# Patient Record
Sex: MALE | Race: WHITE | NOT HISPANIC OR LATINO | Employment: OTHER | URBAN - METROPOLITAN AREA
[De-identification: names, ages, dates, MRNs, and addresses within clinical notes are randomized per-mention and may not be internally consistent; named-entity substitution may affect disease eponyms.]

---

## 2017-01-03 DIAGNOSIS — Z95.810 ICD (IMPLANTABLE CARDIOVERTER-DEFIBRILLATOR) IN PLACE: Primary | ICD-10-CM

## 2017-01-03 DIAGNOSIS — I25.5 ISCHEMIC CARDIOMYOPATHY: ICD-10-CM

## 2017-01-03 DIAGNOSIS — I47.29 NSVT (NONSUSTAINED VENTRICULAR TACHYCARDIA): ICD-10-CM

## 2017-01-05 ENCOUNTER — CLINICAL SUPPORT (OUTPATIENT)
Dept: CARDIOLOGY | Facility: CLINIC | Age: 72
End: 2017-01-05
Payer: COMMERCIAL

## 2017-01-05 ENCOUNTER — OFFICE VISIT (OUTPATIENT)
Dept: CARDIOLOGY | Facility: CLINIC | Age: 72
End: 2017-01-05
Payer: COMMERCIAL

## 2017-01-05 VITALS
HEIGHT: 70 IN | HEART RATE: 68 BPM | BODY MASS INDEX: 24.77 KG/M2 | WEIGHT: 173 LBS | SYSTOLIC BLOOD PRESSURE: 120 MMHG | DIASTOLIC BLOOD PRESSURE: 66 MMHG

## 2017-01-05 DIAGNOSIS — I25.9 CHRONIC ISCHEMIC HEART DISEASE: Chronic | ICD-10-CM

## 2017-01-05 DIAGNOSIS — I27.20 PULMONARY HTN: ICD-10-CM

## 2017-01-05 DIAGNOSIS — I48.92 PAROXYSMAL ATRIAL FLUTTER: Chronic | ICD-10-CM

## 2017-01-05 DIAGNOSIS — I25.10 CORONARY ARTERY DISEASE DUE TO LIPID RICH PLAQUE: Chronic | ICD-10-CM

## 2017-01-05 DIAGNOSIS — I47.29 NSVT (NONSUSTAINED VENTRICULAR TACHYCARDIA): ICD-10-CM

## 2017-01-05 DIAGNOSIS — I50.22 CHRONIC SYSTOLIC CONGESTIVE HEART FAILURE: ICD-10-CM

## 2017-01-05 DIAGNOSIS — I25.5 ISCHEMIC CARDIOMYOPATHY: ICD-10-CM

## 2017-01-05 DIAGNOSIS — N18.6 ESRD (END STAGE RENAL DISEASE): Chronic | ICD-10-CM

## 2017-01-05 DIAGNOSIS — I10 ESSENTIAL HYPERTENSION: ICD-10-CM

## 2017-01-05 DIAGNOSIS — I48.92 ATRIAL FLUTTER WITH RAPID VENTRICULAR RESPONSE: ICD-10-CM

## 2017-01-05 DIAGNOSIS — Z95.810 S/P IMPLANTATION OF AUTOMATIC CARDIOVERTER/DEFIBRILLATOR (AICD): Primary | ICD-10-CM

## 2017-01-05 DIAGNOSIS — Z95.810 ICD (IMPLANTABLE CARDIOVERTER-DEFIBRILLATOR) IN PLACE: ICD-10-CM

## 2017-01-05 DIAGNOSIS — I25.83 CORONARY ARTERY DISEASE DUE TO LIPID RICH PLAQUE: Chronic | ICD-10-CM

## 2017-01-05 PROCEDURE — 99213 OFFICE O/P EST LOW 20 MIN: CPT | Mod: S$GLB,,, | Performed by: PHYSICIAN ASSISTANT

## 2017-01-05 PROCEDURE — 1126F AMNT PAIN NOTED NONE PRSNT: CPT | Mod: S$GLB,,, | Performed by: PHYSICIAN ASSISTANT

## 2017-01-05 PROCEDURE — 1160F RVW MEDS BY RX/DR IN RCRD: CPT | Mod: S$GLB,,, | Performed by: PHYSICIAN ASSISTANT

## 2017-01-05 PROCEDURE — 1157F ADVNC CARE PLAN IN RCRD: CPT | Mod: S$GLB,,, | Performed by: PHYSICIAN ASSISTANT

## 2017-01-05 PROCEDURE — 1159F MED LIST DOCD IN RCRD: CPT | Mod: S$GLB,,, | Performed by: PHYSICIAN ASSISTANT

## 2017-01-05 PROCEDURE — 99999 PR PBB SHADOW E&M-EST. PATIENT-LVL IV: CPT | Mod: PBBFAC,,, | Performed by: PHYSICIAN ASSISTANT

## 2017-01-05 NOTE — PROGRESS NOTES
Subjective:    Patient ID:  Quinn Hutchins is a 71 y.o. male who presents for follow-up of hospital follow-up/wound check    HPI   Mr. Hutchins is a 71 year old male patient with a PMHx of HD, HTN, ICM, pulmonary HTN, atrial flutter, anemia, and syncope who presents today for routine follow-up/wound check. Patient recently underwent AICD placement on 12/27/16 by Dr. Woodward. He returns today and states he is feeling well. No complaints. MACIAS stable, unchanged. No chest pain or anginal signs/symptoms. No AICD site complaints/shocks. Patient reports compliance with his medications and dialysis regimen. AICD interrogation revealed ? PAF on 1/2/16, will have Dr. Woodward review. Patient was previously on Coumadin in the past however this was stopped due to severe epistaxis.     Review of Systems   Constitution: Negative for chills, decreased appetite, fever, weakness and malaise/fatigue.   HENT: Negative for congestion, headaches, hoarse voice and sore throat.    Eyes: Negative for blurred vision and discharge.   Cardiovascular: Positive for dyspnea on exertion (chronic, unchanged) and leg swelling. Negative for chest pain, claudication, cyanosis, irregular heartbeat, near-syncope, orthopnea, palpitations and paroxysmal nocturnal dyspnea.   Respiratory: Negative for cough, hemoptysis, shortness of breath, snoring, sputum production and wheezing.    Endocrine: Negative for cold intolerance and heat intolerance.   Hematologic/Lymphatic: Negative for bleeding problem. Does not bruise/bleed easily.   Skin: Negative for rash.   Musculoskeletal: Positive for arthritis and joint pain. Negative for back pain, joint swelling, muscle cramps, muscle weakness and myalgias.   Gastrointestinal: Negative for abdominal pain, constipation, diarrhea, heartburn, melena and nausea.   Genitourinary: Negative for hematuria.   Neurological: Negative for dizziness, focal weakness, light-headedness, loss of balance, numbness, paresthesias and  seizures.   Psychiatric/Behavioral: Negative for memory loss. The patient does not have insomnia.    Allergic/Immunologic: Negative for hives.        Objective:    Physical Exam   Constitutional: He is oriented to person, place, and time. He appears well-developed and well-nourished. No distress.   HENT:   Head: Normocephalic and atraumatic.   Eyes: Pupils are equal, round, and reactive to light. Right eye exhibits no discharge. Left eye exhibits no discharge.   Neck: Neck supple. No JVD present. No tracheal deviation present. No thyromegaly present.   Cardiovascular: Normal rate, regular rhythm, S1 normal, S2 normal, normal heart sounds and intact distal pulses.  PMI is not displaced.  Exam reveals no gallop, no S3, no S4 and no friction rub.    No murmur heard.  Pulses:       Radial pulses are 2+ on the right side   Pulmonary/Chest: Effort normal and breath sounds normal. No respiratory distress. He has no wheezes. He has no rales.   AICD site C/D/I; no erythema or active bleeding, no drainage   Abdominal: Soft. He exhibits no distension. There is no tenderness. There is no rebound.   Musculoskeletal: He exhibits no edema.   Neurological: He is alert and oriented to person, place, and time.   Skin: Skin is warm and dry. He is not diaphoretic. No erythema.   CVI changes BLE     Psychiatric: He has a normal mood and affect. His behavior is normal. Thought content normal.   Nursing note and vitals reviewed.        2D Echo CONCLUSIONS     1 - Severe left ventricular enlargement.     2 - Biatrial enlargement.     3 - Eccentric hypertrophy.     4 - Severely depressed left ventricular systolic function (EF 20-25%).     5 - Left ventricular diastolic dysfunction.     6 - Right ventricular enlargement with moderately to severely depressed systolic function.     7 - Pulmonary hypertension. The estimated PA systolic pressure is 55 mmHg.     8 - Intermediate central venous pressure.     9 - Mildly enlarged ascending aorta.        Impression: NORMAL MYOCARDIAL PERFUSION  1. The perfusion scan is free of evidence for myocardial ischemia or injury. The inferior wall is obscured by bowel.  2. There is abnormal wall motion at rest showing mild to moderate global hypokinesis of the left ventricle.   3. There is resting LV dysfunction with a reduced ejection fraction of 40 %.   4. The ventricular volumes are normal at rest and stress.   5. The extracardiac distribution of radioactivity is normal.   Assessment:       1. S/P implantation of automatic cardioverter/defibrillator (AICD)    2. Pulmonary HTN    3. Atrial flutter with rapid ventricular response    4. Chronic ischemic heart disease    5. Chronic systolic congestive heart failure    6. Coronary artery disease due to lipid rich plaque    7. Essential hypertension    8. Paroxysmal atrial flutter    9. ESRD (end stage renal disease)       Doing well s/p AICD implantation. No complaints. Continue current meds. Questionable episode of PAF on 1/2/16?  Device interrogation report still pending, will await final report and Dr. Woodward's recommendations. As stated earlier, Coumadin previously stopped due to severe epistaxis. Message routed to Dr. Woodward today.  Plan:   -Continue current medical management and risk factor modification  -Cardiac, low salt diet  -Will route message to Dr. Woodward  -Follow-up with Dr. Anne in 3 months    Chart reviewed. Dr. Anne agrees with plan as outlined above.

## 2017-01-05 NOTE — MR AVS SNAPSHOT
[facility]  [address] [facility] [address]  Phone: [phone]  Fax: [phone]                  Quinn Hutchins   2017 4:30 PM   Office Visit    Description:  Male : 1945   Provider:  NIKKI Stokes   Department:  [facility]                To Do List           Future Appointments        Provider Department Dept Phone    3/28/2017 1:30 PM LABORATORY, [address] [facility] [phone]    3/28/2017 3:00 PM NIKKI Morton-MINE [facility] [phone]    2017 11:10 AM LABORATORY, [address] [facility] [phone]    2017 11:30 AM [facility] CT1 LIMIT 500 LBS [facility]  [phone]    2017 1:00 PM Aristeo Valentine IV, MD [facility] [phone]      Goals (5 Years of Data)     Continue renal heart healthy diet.       [facility]     [facility] Nurse Care Line -  Assistance  Registered nurses in the [facility] provide clinical advisement, health education, appointment booking, and other advisory services.  Call for this free service at [phone].             Medications           Message regarding Medications     Verify the changes and/or additions to your medication regime listed below are the same as discussed with your clinician today.  If any of these changes or additions are incorrect, please notify your healthcare provider.        STOP taking these medications     colchicine (COLCRYS) 0.6 mg tablet Take 0.3mg (half a tablet) once a week on .           Verify that the below list of medications is an accurate representation of the medications you are currently taking.  If none reported, the list may be blank. If incorrect, please contact your healthcare provider. Carry this list with you in case of emergency.           Current Medications     acetaminophen (TYLENOL) 500 MG tablet Take 500 mg by mouth every 6 (six) hours as needed for  Pain.    acitretin (SORIATANE) 10 MG capsule Take 10 mg by mouth every evening.     allopurinol (ZYLOPRIM) 100 MG tablet Take 1 tablet (100 mg total) by mouth once daily.    amiodarone (PACERONE) 400 MG tablet Take 1 tablet (400 mg total) by mouth 2 (two) times daily.    aspirin (ECOTRIN) 81 MG EC tablet Take 81 mg by mouth once daily.    atorvastatin (LIPITOR) 40 MG tablet TAKE 1 TABLET BY MOUTH EVERY EVENING    carvedilol (COREG) 25 MG tablet Take 0.5 tablets (12.5 mg total) by mouth 2 (two) times daily with meals.    docusate sodium (COLACE) 100 MG capsule Take 1 capsule (100 mg total) by mouth 3 (three) times daily as needed for Constipation.    isosorbide mononitrate (IMDUR) 30 MG 24 hr tablet Take 1 tablet (30 mg total) by mouth once daily.    loratadine (CLARITIN) 10 mg tablet Take 10 mg by mouth once daily.    mupirocin (BACTROBAN) 2 % ointment Apply to wound infection 2 times a week for 7 days    nitroGLYCERIN (NITROSTAT) 0.4 MG SL tablet Place 1 tablet (0.4 mg total) under the tongue every 5 (five) minutes as needed for Chest pain.    pantoprazole (PROTONIX) 40 MG tablet Take 1 tablet (40 mg total) by mouth once daily.    predniSONE (DELTASONE) 5 MG tablet TAKE 1 TABLET(S) ORAL (BY MOUTH) EVERY DAY    pyridoxine (VITAMIN B-6) 200 mg Tab Take 1 tablet by mouth every evening.     RANEXA 500 mg Tb12 TAKE 1 TABLET TWICE A DAY    sertraline (ZOLOFT) 100 MG tablet Take 50 mg by mouth daily as needed.    sevelamer carbonate (RENVELA) 800 mg Tab Take 1 tablet (800 mg total) by mouth 3 (three) times daily with meals.    thiamine 100 MG tablet Take 1 tablet (100 mg total) by mouth once daily.    tramadol (ULTRAM) 50 mg tablet Take 1 tablet (50 mg total) by mouth every 6 (six) hours as needed for Pain.           Clinical Reference Information           Vital Signs - Last Recorded  Most recent update: 1/5/2017  4:24 PM by Cheli Orlando LPN    BP Pulse Ht Wt BMI    120/66 (BP Location: Right arm, Patient Position:  "Sitting, BP Method: Manual) 68 5' 10" (1.778 m) 78.5 kg (173 lb) 24.82 kg/m2      Blood Pressure          Most Recent Value    BP  120/66      Allergies as of 1/5/2017     Codeine    Hydrocodone    Percocet [Oxycodone-acetaminophen]    Doxycycline    Levaquin [Levofloxacin]    Xanax [Alprazolam]      Immunizations Administered on Date of Encounter - 1/5/2017     None      Maintenance Dialysis History     Start End Type Comments Center    10/8/2012  [facility]            [facility] Information     [facility] [address]  Phone #:  [phone]    Contact:  N/A JUVENAL VILLANUEVA  [address] Fax #:  [phone]            "

## 2017-01-06 PROBLEM — Z95.810 S/P IMPLANTATION OF AUTOMATIC CARDIOVERTER/DEFIBRILLATOR (AICD): Status: ACTIVE | Noted: 2017-01-06

## 2017-01-27 ENCOUNTER — HOSPITAL ENCOUNTER (OUTPATIENT)
Facility: HOSPITAL | Age: 72
Discharge: HOME | End: 2017-01-28
Attending: EMERGENCY MEDICINE
Payer: COMMERCIAL

## 2017-01-27 DIAGNOSIS — N18.5 CHRONIC RENAL FAILURE, STAGE 5: ICD-10-CM

## 2017-01-27 DIAGNOSIS — R79.89 ELEVATED TROPONIN I LEVEL: Primary | ICD-10-CM

## 2017-01-27 DIAGNOSIS — R94.31 ABNORMAL EKG: ICD-10-CM

## 2017-01-27 DIAGNOSIS — R11.2 NON-INTRACTABLE VOMITING WITH NAUSEA, UNSPECIFIED VOMITING TYPE: ICD-10-CM

## 2017-01-27 LAB
ALBUMIN SERPL BCP-MCNC: 3.3 G/DL
ALP SERPL-CCNC: 131 U/L
ALT SERPL W/O P-5'-P-CCNC: 8 U/L
ANION GAP SERPL CALC-SCNC: 13 MMOL/L
APTT BLDCRRT: 31.2 SEC
AST SERPL-CCNC: 19 U/L
BASOPHILS # BLD AUTO: 0.01 K/UL
BASOPHILS NFR BLD: 0.1 %
BILIRUB SERPL-MCNC: 1.1 MG/DL
BNP SERPL-MCNC: 4582 PG/ML
BUN SERPL-MCNC: 22 MG/DL
CALCIUM SERPL-MCNC: 10.4 MG/DL
CHLORIDE SERPL-SCNC: 100 MMOL/L
CK MB SERPL-MCNC: 1.8 NG/ML
CK MB SERPL-RTO: 6 %
CK SERPL-CCNC: 30 U/L
CK SERPL-CCNC: 30 U/L
CO2 SERPL-SCNC: 24 MMOL/L
CREAT SERPL-MCNC: 4.3 MG/DL
DIFFERENTIAL METHOD: ABNORMAL
EOSINOPHIL # BLD AUTO: 0 K/UL
EOSINOPHIL NFR BLD: 0.3 %
ERYTHROCYTE [DISTWIDTH] IN BLOOD BY AUTOMATED COUNT: 20.5 %
EST. GFR  (AFRICAN AMERICAN): 15 ML/MIN/1.73 M^2
EST. GFR  (NON AFRICAN AMERICAN): 13 ML/MIN/1.73 M^2
GLUCOSE SERPL-MCNC: 113 MG/DL
HCT VFR BLD AUTO: 45.2 %
HGB BLD-MCNC: 15.5 G/DL
INR PPP: 1.1
LYMPHOCYTES # BLD AUTO: 0.9 K/UL
LYMPHOCYTES NFR BLD: 8.2 %
MAGNESIUM SERPL-MCNC: 2.1 MG/DL
MCH RBC QN AUTO: 33 PG
MCHC RBC AUTO-ENTMCNC: 34.3 %
MCV RBC AUTO: 96 FL
MONOCYTES # BLD AUTO: 0.9 K/UL
MONOCYTES NFR BLD: 7.8 %
NEUTROPHILS # BLD AUTO: 9.2 K/UL
NEUTROPHILS NFR BLD: 83.6 %
PHOSPHATE SERPL-MCNC: 4.2 MG/DL
PLATELET # BLD AUTO: 117 K/UL
PMV BLD AUTO: 9.9 FL
POTASSIUM SERPL-SCNC: 4.3 MMOL/L
PROT SERPL-MCNC: 7.9 G/DL
PROTHROMBIN TIME: 11.6 SEC
RBC # BLD AUTO: 4.7 M/UL
SODIUM SERPL-SCNC: 137 MMOL/L
TROPONIN I SERPL DL<=0.01 NG/ML-MCNC: 0.1 NG/ML
WBC # BLD AUTO: 10.97 K/UL

## 2017-01-27 PROCEDURE — 82553 CREATINE MB FRACTION: CPT

## 2017-01-27 PROCEDURE — 96372 THER/PROPH/DIAG INJ SC/IM: CPT

## 2017-01-27 PROCEDURE — 84484 ASSAY OF TROPONIN QUANT: CPT

## 2017-01-27 PROCEDURE — 80053 COMPREHEN METABOLIC PANEL: CPT

## 2017-01-27 PROCEDURE — 85610 PROTHROMBIN TIME: CPT

## 2017-01-27 PROCEDURE — 93010 ELECTROCARDIOGRAM REPORT: CPT | Mod: ,,, | Performed by: INTERNAL MEDICINE

## 2017-01-27 PROCEDURE — 96374 THER/PROPH/DIAG INJ IV PUSH: CPT

## 2017-01-27 PROCEDURE — 85025 COMPLETE CBC W/AUTO DIFF WBC: CPT

## 2017-01-27 PROCEDURE — 84100 ASSAY OF PHOSPHORUS: CPT

## 2017-01-27 PROCEDURE — 63600175 PHARM REV CODE 636 W HCPCS: Performed by: EMERGENCY MEDICINE

## 2017-01-27 PROCEDURE — G0378 HOSPITAL OBSERVATION PER HR: HCPCS

## 2017-01-27 PROCEDURE — 99285 EMERGENCY DEPT VISIT HI MDM: CPT | Mod: 25

## 2017-01-27 PROCEDURE — 83735 ASSAY OF MAGNESIUM: CPT

## 2017-01-27 PROCEDURE — 83880 ASSAY OF NATRIURETIC PEPTIDE: CPT

## 2017-01-27 PROCEDURE — 85730 THROMBOPLASTIN TIME PARTIAL: CPT

## 2017-01-27 RX ORDER — ONDANSETRON 2 MG/ML
4 INJECTION INTRAMUSCULAR; INTRAVENOUS
Status: COMPLETED | OUTPATIENT
Start: 2017-01-27 | End: 2017-01-27

## 2017-01-27 RX ORDER — SEVELAMER CARBONATE 800 MG/1
800 TABLET, FILM COATED ORAL
Status: DISCONTINUED | OUTPATIENT
Start: 2017-01-28 | End: 2017-01-28 | Stop reason: HOSPADM

## 2017-01-27 RX ORDER — CARVEDILOL 12.5 MG/1
12.5 TABLET ORAL 2 TIMES DAILY WITH MEALS
Status: DISCONTINUED | OUTPATIENT
Start: 2017-01-28 | End: 2017-01-28 | Stop reason: HOSPADM

## 2017-01-27 RX ORDER — ONDANSETRON 2 MG/ML
4 INJECTION INTRAMUSCULAR; INTRAVENOUS EVERY 12 HOURS PRN
Status: DISCONTINUED | OUTPATIENT
Start: 2017-01-27 | End: 2017-01-28 | Stop reason: HOSPADM

## 2017-01-27 RX ORDER — AMIODARONE HYDROCHLORIDE 200 MG/1
200 TABLET ORAL DAILY
Status: CANCELLED | OUTPATIENT
Start: 2017-01-28

## 2017-01-27 RX ORDER — ISOSORBIDE MONONITRATE 30 MG/1
30 TABLET, EXTENDED RELEASE ORAL DAILY
Status: CANCELLED | OUTPATIENT
Start: 2017-01-28

## 2017-01-27 RX ORDER — ACETAMINOPHEN 325 MG/1
650 TABLET ORAL EVERY 8 HOURS PRN
Status: DISCONTINUED | OUTPATIENT
Start: 2017-01-27 | End: 2017-01-28 | Stop reason: HOSPADM

## 2017-01-27 RX ORDER — ENOXAPARIN SODIUM 100 MG/ML
1 INJECTION SUBCUTANEOUS
Status: COMPLETED | OUTPATIENT
Start: 2017-01-27 | End: 2017-01-27

## 2017-01-27 RX ORDER — NITROGLYCERIN 0.4 MG/1
0.4 TABLET SUBLINGUAL EVERY 5 MIN PRN
Status: DISCONTINUED | OUTPATIENT
Start: 2017-01-27 | End: 2017-01-28 | Stop reason: HOSPADM

## 2017-01-27 RX ORDER — ASPIRIN 81 MG/1
81 TABLET ORAL DAILY
Status: CANCELLED | OUTPATIENT
Start: 2017-01-28

## 2017-01-27 RX ORDER — CETIRIZINE HYDROCHLORIDE 10 MG/1
10 TABLET ORAL DAILY
Status: DISCONTINUED | OUTPATIENT
Start: 2017-01-28 | End: 2017-01-28 | Stop reason: HOSPADM

## 2017-01-27 RX ORDER — PANTOPRAZOLE SODIUM 40 MG/1
40 TABLET, DELAYED RELEASE ORAL DAILY
Status: DISCONTINUED | OUTPATIENT
Start: 2017-01-28 | End: 2017-01-28 | Stop reason: HOSPADM

## 2017-01-27 RX ORDER — ATORVASTATIN CALCIUM 40 MG/1
40 TABLET, FILM COATED ORAL NIGHTLY
Status: DISCONTINUED | OUTPATIENT
Start: 2017-01-28 | End: 2017-01-28 | Stop reason: HOSPADM

## 2017-01-27 RX ADMIN — ENOXAPARIN SODIUM 80 MG: 100 INJECTION SUBCUTANEOUS at 09:01

## 2017-01-27 RX ADMIN — ONDANSETRON 4 MG: 2 INJECTION INTRAMUSCULAR; INTRAVENOUS at 07:01

## 2017-01-27 NOTE — ED AVS SNAPSHOT
[facility]   [address] [facility] [address]               Quinn Hutchins   2017  6:47 PM   ED    Description:  Male : 1945   Department:  [facility]           Your Care was Coordinated By:     Provider Role From To    Alex Ying MD Attending Provider 17 1848 17 1641      Reason for Visit     Headache           Diagnoses this Visit        Comments    Elevated troponin I level    -  Primary     Non-intractable vomiting with nausea, unspecified vomiting type         Abnormal EKG         Chronic renal failure, stage 5           ED Disposition     ED Disposition Condition Comment    Observation             To Do List           Follow-up Information     Follow up with Jameel Almaguer MD In 3 days.    Specialty:  Internal Medicine    Why:  hospital follow up     Contact information:    [address]  [facility] [address] [phone]         These Medications        Disp Refills Start End    ondansetron (ZOFRAN-ODT) 4 MG TbDL 20 tablet 0 2017     Take 2 tablets (8 mg total) by mouth every 8 (eight) hours as needed. - Oral    Pharmacy: [facility]/pharmacy #[id number] [facility] [address] [facility] #: [phone]         [facility]     [facility] Care Line -  Assistance  Registered nurses in the [facility] provide clinical advisement, health education, appointment booking, and other advisory services.  Call for this free service at [phone].             Medications           Message regarding Medications     Verify the changes and/or additions to your medication regime listed below are the same as discussed with your clinician today.  If any of these changes or additions are incorrect, please notify your healthcare provider.        START taking these NEW medications        Refills    ondansetron (ZOFRAN-ODT) 4 MG TbDL 0    Sig: Take 2 tablets (8 mg total) by mouth every 8 (eight)  hours as needed.    Class: Normal    Route: Oral      These medications were administered today        Dose Freq    ondansetron injection 4 mg 4 mg ED 1 Time    Sig: Inject 4 mg into the vein ED 1 Time.    Class: Normal    Route: Intravenous    ondansetron injection 4 mg 4 mg Every 12 hours PRN    Sig: Inject 4 mg into the vein every 12 (twelve) hours as needed for Nausea/Vomiting (1st choice) - use as first treatment.    Class: Normal    Route: Intravenous    acetaminophen tablet 650 mg 650 mg Every 8 hours PRN    Sig: Take 2 tablets (650 mg total) by mouth every 8 (eight) hours as needed for Temperature greater than (or equal to 101 degree F).    Class: Normal    Route: Oral    enoxaparin injection 80 mg 1 mg/kg × 76.8 kg ED 1 Time    Sig: Inject 0.8 mLs (80 mg total) into the skin ED 1 Time.    Class: Normal    Route: Subcutaneous    cetirizine tablet 10 mg 10 mg Daily    Starting on: 1/28/2017    Sig: Take 1 tablet (10 mg total) by mouth once daily.    Class: Normal    Route: Oral           Verify that the below list of medications is an accurate representation of the medications you are currently taking.  If none reported, the list may be blank. If incorrect, please contact your healthcare provider. Carry this list with you in case of emergency.           Current Medications     amiodarone (PACERONE) 400 MG tablet Take 1 tablet (400 mg total) by mouth 2 (two) times daily.    aspirin (ECOTRIN) 81 MG EC tablet Take 81 mg by mouth once daily.    atorvastatin (LIPITOR) 40 MG tablet TAKE 1 TABLET BY MOUTH EVERY EVENING    docusate sodium (COLACE) 100 MG capsule Take 1 capsule (100 mg total) by mouth 3 (three) times daily as needed for Constipation.    isosorbide mononitrate (IMDUR) 30 MG 24 hr tablet Take 1 tablet (30 mg total) by mouth once daily.    loratadine (CLARITIN) 10 mg tablet Take 10 mg by mouth once daily.    pantoprazole (PROTONIX) 40 MG tablet Take 1 tablet (40 mg total) by mouth once daily.     pyridoxine (VITAMIN B-6) 200 mg Tab Take 1 tablet by mouth every evening.     RANEXA 500 mg Tb12 TAKE 1 TABLET TWICE A DAY    sertraline (ZOLOFT) 100 MG tablet Take 50 mg by mouth daily as needed.    sevelamer carbonate (RENVELA) 800 mg Tab Take 1 tablet (800 mg total) by mouth 3 (three) times daily with meals.    thiamine 100 MG tablet Take 1 tablet (100 mg total) by mouth once daily.    acetaminophen (TYLENOL) 500 MG tablet Take 500 mg by mouth every 6 (six) hours as needed for Pain.    acetaminophen tablet 650 mg Take 2 tablets (650 mg total) by mouth every 8 (eight) hours as needed for Temperature greater than (or equal to 101 degree F).    acitretin (SORIATANE) 10 MG capsule Take 10 mg by mouth every evening.     allopurinol (ZYLOPRIM) 100 MG tablet Take 1 tablet (100 mg total) by mouth once daily.    atorvastatin tablet 40 mg Take 1 tablet (40 mg total) by mouth every evening.    carvedilol (COREG) 25 MG tablet Take 0.5 tablets (12.5 mg total) by mouth 2 (two) times daily with meals.    carvedilol tablet 12.5 mg Take 1 tablet (12.5 mg total) by mouth 2 (two) times daily with meals.    cetirizine tablet 10 mg Take 1 tablet (10 mg total) by mouth once daily.    mupirocin (BACTROBAN) 2 % ointment Apply to wound infection 2 times a week for 7 days    nitroGLYCERIN (NITROSTAT) 0.4 MG SL tablet Place 1 tablet (0.4 mg total) under the tongue every 5 (five) minutes as needed for Chest pain.    nitroGLYCERIN SL tablet 0.4 mg Place 1 tablet (0.4 mg total) under the tongue every 5 (five) minutes as needed for Chest pain.    ondansetron (ZOFRAN-ODT) 4 MG TbDL Take 2 tablets (8 mg total) by mouth every 8 (eight) hours as needed.    ondansetron injection 4 mg Inject 4 mg into the vein every 12 (twelve) hours as needed for Nausea/Vomiting (1st choice) - use as first treatment.    pantoprazole EC tablet 40 mg Take 1 tablet (40 mg total) by mouth once daily.    predniSONE (DELTASONE) 5 MG tablet TAKE 1 TABLET(S) ORAL (BY  "MOUTH) EVERY DAY    sevelamer carbonate tablet 800 mg Take 1 tablet (800 mg total) by mouth 3 (three) times daily with meals.    tramadol (ULTRAM) 50 mg tablet Take 1 tablet (50 mg total) by mouth every 6 (six) hours as needed for Pain.           Clinical Reference Information           Your Vitals Were     BP Pulse Temp Resp Height Weight    147/91 88 98.4 °F (36.9 °C) (Oral) 17 5' 9" (1.753 m) 76.8 kg (169 lb 5 oz)    SpO2 BMI             99% 25 kg/m2         Allergies as of 1/28/2017        Reactions    Codeine Other (See Comments)    Hallucination    Hydrocodone Other (See Comments)    Impairs vision; AMS    Percocet [Oxycodone-acetaminophen] Other (See Comments)    Makes him go crazy    Doxycycline Rash    Other reaction(s): Unknown    Levaquin [Levofloxacin] Rash    Blisters    Xanax [Alprazolam] Palpitations    pychosis       Immunizations Administered on Date of Encounter - 1/28/2017     None      ED Micro, Lab, POCT     Start Ordered       Status Ordering Provider    01/28/17 0708 01/28/17 0707  Comprehensive metabolic panel  STAT      Final result     01/28/17 0600 01/28/17 0413    Early Morning Draw,   Status:  Canceled      Canceled     01/28/17 0600 01/28/17 0413  Troponin I  Now then every 6 hours     Start Status   01/28/17 0600 Final result   01/28/17 1200 Acknowledged       Acknowledged     01/28/17 0600 01/28/17 0413  CK-MB  Every 6 hours     Start Status   01/28/17 0600 Final result   01/28/17 1200 Acknowledged       Acknowledged     01/28/17 0600 01/28/17 0413  CBC auto differential  Early Morning Draw      Edited Result - FINAL     01/28/17 0430 01/27/17 2053    Early Morning Draw,   Status:  Canceled      Canceled     01/28/17 0430 01/27/17 2053    Early Morning Draw,   Status:  Canceled      Canceled     01/28/17 0100 01/27/17 2058    Now then every 6 hours,   Status:  Canceled     Start Status   01/28/17 0100 Final result   01/28/17 0700 Canceled       Canceled     01/28/17 0000 01/27/17 2053 "    Every 6 hours,   Status:  Canceled      Canceled     01/28/17 0000 01/27/17 2054    Every 6 hours,   Status:  Canceled     Start Status   01/28/17 0000 Final result   01/28/17 0600 Canceled       Canceled     01/28/17 0000 01/27/17 2054  CK  Every 6 hours     Start Status   01/28/17 0000 Final result   01/28/17 0600 Final result       Acknowledged     01/27/17 1902 01/27/17 1902  CBC auto differential  STAT      Final result     01/27/17 1902 01/27/17 1902  Comprehensive metabolic panel  STAT      Final result     01/27/17 1902 01/27/17 1902  Protime-INR  STAT      Final result     01/27/17 1902 01/27/17 1902  APTT  STAT      Final result     01/27/17 1902 01/27/17 1902  Magnesium  STAT      Final result     01/27/17 1902 01/27/17 1902  Phosphorus  STAT      Final result     01/27/17 1902 01/27/17 1902  Troponin I  STAT      Final result     01/27/17 1902 01/27/17 1902  CK-MB  STAT      Final result     01/27/17 1902 01/27/17 1902  CK  STAT      Final result     01/27/17 1902 01/27/17 1902  Brain natriuretic peptide  STAT      Final result       ED Imaging Orders     Start Ordered       Status Ordering Provider    01/27/17 1902 01/27/17 1902  CT Head Without Contrast  1 time imaging      Final result     01/27/17 1902 01/27/17 1902  X-Ray Chest 1 View  1 time imaging      Final result       Your Scheduled Appointments     Mar 28, 2017  1:30 PM CDT   Non-Fasting Lab with [facility]ATORY, [place] [facility]    [address]  [place] [address]   [phone]            Mar 28, 2017  2:20 PM CDT   Established Patient Visit with Darron Anne MD   [facility]    [address]  [place] [address]   [phone]            Mar 28, 2017  3:00 PM CDT   Established Patient Visit with Denisha Manzano PA-C   [facility] ([facility]    [address]  [place] [address]   [phone]            Jul 13, 2017 11:10 AM CDT   Non-Fasting Lab with [facility]ATORY,  [facility]   [facility]    [address] [facility] [address]   [phone]            Jul 18, 2017 11:30 AM CDT   Ct Abd Pel W Contrast with [facility] CT1 LIMIT 500 LBS   [facility]    [address] [address] [address]   [phone]               [facility] complies with applicable Federal civil rights laws and does not discriminate on the basis of race, color, national origin, age, disability, or sex.        Language Assistance Services     ATTENTION: Language assistance services are available, free of charge. Please call [phone].      ATENCIÓN: Si habla español, tiene a koehler disposición servicios gratuitos de asistencia lingüística. Llame al [phone].     TATE Ý: N?u b?n nói Ti?ng Vi?t, có các d?ch v? h? tr? ngôn ng? mi?n phí dành cho b?n. G?i s? [phone].

## 2017-01-28 VITALS
RESPIRATION RATE: 15 BRPM | BODY MASS INDEX: 25.08 KG/M2 | HEIGHT: 69 IN | SYSTOLIC BLOOD PRESSURE: 103 MMHG | HEART RATE: 70 BPM | TEMPERATURE: 98 F | OXYGEN SATURATION: 100 % | WEIGHT: 169.31 LBS | DIASTOLIC BLOOD PRESSURE: 67 MMHG

## 2017-01-28 LAB
ALBUMIN SERPL BCP-MCNC: 3.1 G/DL
ALP SERPL-CCNC: 131 U/L
ALT SERPL W/O P-5'-P-CCNC: 10 U/L
ANION GAP SERPL CALC-SCNC: 19 MMOL/L
ANISOCYTOSIS BLD QL SMEAR: ABNORMAL
AST SERPL-CCNC: 19 U/L
BASOPHILS # BLD AUTO: 0.02 K/UL
BASOPHILS NFR BLD: 0.2 %
BILIRUB SERPL-MCNC: 0.7 MG/DL
BUN SERPL-MCNC: 32 MG/DL
CALCIUM SERPL-MCNC: 10.3 MG/DL
CHLORIDE SERPL-SCNC: 101 MMOL/L
CK MB SERPL-MCNC: 1.7 NG/ML
CK MB SERPL-MCNC: 2.2 NG/ML
CK MB SERPL-RTO: 5.9 %
CK MB SERPL-RTO: 9.6 %
CK SERPL-CCNC: 23 U/L
CK SERPL-CCNC: 23 U/L
CK SERPL-CCNC: 29 U/L
CK SERPL-CCNC: 29 U/L
CO2 SERPL-SCNC: 20 MMOL/L
CREAT SERPL-MCNC: 5.3 MG/DL
DACRYOCYTES BLD QL SMEAR: ABNORMAL
DIFFERENTIAL METHOD: ABNORMAL
EOSINOPHIL # BLD AUTO: 0.1 K/UL
EOSINOPHIL NFR BLD: 0.8 %
ERYTHROCYTE [DISTWIDTH] IN BLOOD BY AUTOMATED COUNT: 20.7 %
EST. GFR  (AFRICAN AMERICAN): 12 ML/MIN/1.73 M^2
EST. GFR  (NON AFRICAN AMERICAN): 10 ML/MIN/1.73 M^2
GLUCOSE SERPL-MCNC: 69 MG/DL
HCT VFR BLD AUTO: 45.8 %
HGB BLD-MCNC: 15.8 G/DL
HYPOCHROMIA BLD QL SMEAR: ABNORMAL
LYMPHOCYTES # BLD AUTO: 1.4 K/UL
LYMPHOCYTES NFR BLD: 14.8 %
MCH RBC QN AUTO: 33.1 PG
MCHC RBC AUTO-ENTMCNC: 34.5 %
MCV RBC AUTO: 96 FL
MONOCYTES # BLD AUTO: 1.3 K/UL
MONOCYTES NFR BLD: 13.1 %
NEUTROPHILS # BLD AUTO: 6.8 K/UL
NEUTROPHILS NFR BLD: 71.3 %
PLATELET # BLD AUTO: 126 K/UL
PLATELET BLD QL SMEAR: ABNORMAL
PMV BLD AUTO: 10.4 FL
POIKILOCYTOSIS BLD QL SMEAR: SLIGHT
POTASSIUM SERPL-SCNC: 4.6 MMOL/L
PROT SERPL-MCNC: 7.6 G/DL
RBC # BLD AUTO: 4.78 M/UL
SODIUM SERPL-SCNC: 140 MMOL/L
SPHEROCYTES BLD QL SMEAR: ABNORMAL
TARGETS BLD QL SMEAR: ABNORMAL
TROPONIN I SERPL DL<=0.01 NG/ML-MCNC: 0.11 NG/ML
TROPONIN I SERPL DL<=0.01 NG/ML-MCNC: 0.12 NG/ML
WBC # BLD AUTO: 9.55 K/UL

## 2017-01-28 PROCEDURE — 25000003 PHARM REV CODE 250: Performed by: NURSE PRACTITIONER

## 2017-01-28 PROCEDURE — 99213 OFFICE O/P EST LOW 20 MIN: CPT | Mod: ,,, | Performed by: INTERNAL MEDICINE

## 2017-01-28 PROCEDURE — 84484 ASSAY OF TROPONIN QUANT: CPT

## 2017-01-28 PROCEDURE — 82553 CREATINE MB FRACTION: CPT | Mod: 91

## 2017-01-28 PROCEDURE — 25000003 PHARM REV CODE 250: Performed by: EMERGENCY MEDICINE

## 2017-01-28 PROCEDURE — 82553 CREATINE MB FRACTION: CPT

## 2017-01-28 PROCEDURE — 84484 ASSAY OF TROPONIN QUANT: CPT | Mod: 91

## 2017-01-28 PROCEDURE — G0378 HOSPITAL OBSERVATION PER HR: HCPCS

## 2017-01-28 PROCEDURE — 99283 EMERGENCY DEPT VISIT LOW MDM: CPT | Mod: ,,, | Performed by: INTERNAL MEDICINE

## 2017-01-28 PROCEDURE — 85025 COMPLETE CBC W/AUTO DIFF WBC: CPT

## 2017-01-28 PROCEDURE — 80053 COMPREHEN METABOLIC PANEL: CPT

## 2017-01-28 RX ORDER — ONDANSETRON 4 MG/1
8 TABLET, ORALLY DISINTEGRATING ORAL EVERY 8 HOURS PRN
Qty: 20 TABLET | Refills: 0 | Status: SHIPPED | OUTPATIENT
Start: 2017-01-28

## 2017-01-28 RX ADMIN — CETIRIZINE HYDROCHLORIDE 10 MG: 10 TABLET, FILM COATED ORAL at 09:01

## 2017-01-28 RX ADMIN — SEVELAMER CARBONATE 800 MG: 800 TABLET, FILM COATED ORAL at 09:01

## 2017-01-28 RX ADMIN — CARVEDILOL 12.5 MG: 12.5 TABLET, FILM COATED ORAL at 09:01

## 2017-01-28 RX ADMIN — PANTOPRAZOLE SODIUM 40 MG: 40 TABLET, DELAYED RELEASE ORAL at 09:01

## 2017-01-28 NOTE — ED NOTES
Dr. Daniels aware of pt repeat Trop. No new orders given at this time. Pt denies cp. Resting in bed . Vss.

## 2017-01-28 NOTE — ASSESSMENT & PLAN NOTE
-EKG shows NSR, possible Left atrial enlargement, LVH, Marked ST abnormality, with prolonged QT  -will follow serial troponins  -Cardiology consulted in am
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
## 2017-01-28 NOTE — ED NOTES
Assumed care of pt at this time. Pt resting in ER stretcher, aaox4, rr e/u, NAD noted. Bed low and locked, call light in reach, side rails up x2. Cardiac monitor and fall precautions in place. Vss noted. Pt verbalized understanding of status and POC; denies further needs, will continue to monitor.

## 2017-01-28 NOTE — SUBJECTIVE & OBJECTIVE
"Past Medical History   Diagnosis Date    A-V fistula      RIGHT UPPER ARM    Anemia in chronic kidney disease 8/16/2012    Anticoagulant long-term use     Arthritis     Atrial flutter     Cancer      RIGHT RENAL CELL; SKIN CA    Chronic ischemic heart disease 8/20/2013    Chronic systolic congestive heart failure 4/12/2016    Coronary artery disease 8/16/2012    Depression     Dialysis patient      M-W-F    Elbow fracture     Encounter for blood transfusion     ESRD (end stage renal disease) on dialysis 8/16/2012    Fractured coccyx     Gout, unspecified 8/16/2012    Heel bone fracture     Hypertension 8/20/2013    Hypertension, renal 8/16/2012    Ischemic cardiomyopathy 8/20/2013    Kidney stones     Macular degeneration     Mixed hyperlipidemia 8/20/2013    Myocardial infarction     Neuromuscular disorder     JEROME (obstructive sleep apnea) 4/12/2016    JEROME on CPAP     Paroxysmal atrial flutter 5/12/2016    Personal history of skin cancer     Rheumatoid arthritis(714.0) 8/16/2012    Secondary hyperparathyroidism of renal origin 8/16/2012       Past Surgical History   Procedure Laterality Date    Partial nephrectomy Right 2008     for renal cell cancer - "stage 1" per patient.    Tonsillectomy      Av fistula placement       REVISION X2    Cardiac catheterization      Joint replacement Right      hip    Skin biopsy         Review of patient's allergies indicates:   Allergen Reactions    Codeine Other (See Comments)     Hallucination    Hydrocodone Other (See Comments)     Impairs vision; AMS    Percocet [oxycodone-acetaminophen] Other (See Comments)     Makes him go crazy      Doxycycline Rash     Other reaction(s): Unknown    Levaquin [levofloxacin] Rash     Blisters    Xanax [alprazolam] Palpitations     pychosis        Current Facility-Administered Medications on File Prior to Encounter   Medication    hyaluronate sodium, stabilized Syrg 4 mL    phytonadione (vitamin " K1) tablet 5 mg     Current Outpatient Prescriptions on File Prior to Encounter   Medication Sig    amiodarone (PACERONE) 400 MG tablet Take 1 tablet (400 mg total) by mouth 2 (two) times daily. (Patient taking differently: Take 200 mg by mouth once daily. )    aspirin (ECOTRIN) 81 MG EC tablet Take 81 mg by mouth once daily.    atorvastatin (LIPITOR) 40 MG tablet TAKE 1 TABLET BY MOUTH EVERY EVENING    docusate sodium (COLACE) 100 MG capsule Take 1 capsule (100 mg total) by mouth 3 (three) times daily as needed for Constipation.    isosorbide mononitrate (IMDUR) 30 MG 24 hr tablet Take 1 tablet (30 mg total) by mouth once daily.    loratadine (CLARITIN) 10 mg tablet Take 10 mg by mouth once daily.    pantoprazole (PROTONIX) 40 MG tablet Take 1 tablet (40 mg total) by mouth once daily.    pyridoxine (VITAMIN B-6) 200 mg Tab Take 1 tablet by mouth every evening.     RANEXA 500 mg Tb12 TAKE 1 TABLET TWICE A DAY    sertraline (ZOLOFT) 100 MG tablet Take 50 mg by mouth daily as needed.    sevelamer carbonate (RENVELA) 800 mg Tab Take 1 tablet (800 mg total) by mouth 3 (three) times daily with meals. (Patient taking differently: Take 800 mg by mouth 3 (three) times daily. )    thiamine 100 MG tablet Take 1 tablet (100 mg total) by mouth once daily.    acetaminophen (TYLENOL) 500 MG tablet Take 500 mg by mouth every 6 (six) hours as needed for Pain.    acitretin (SORIATANE) 10 MG capsule Take 10 mg by mouth every evening.     allopurinol (ZYLOPRIM) 100 MG tablet Take 1 tablet (100 mg total) by mouth once daily.    carvedilol (COREG) 25 MG tablet Take 0.5 tablets (12.5 mg total) by mouth 2 (two) times daily with meals.    mupirocin (BACTROBAN) 2 % ointment Apply to wound infection 2 times a week for 7 days (Patient taking differently: Apply topically as needed. Apply to wound infection 2 times a week for 7 days)    nitroGLYCERIN (NITROSTAT) 0.4 MG SL tablet Place 1 tablet (0.4 mg total) under the tongue  every 5 (five) minutes as needed for Chest pain.    predniSONE (DELTASONE) 5 MG tablet TAKE 1 TABLET(S) ORAL (BY MOUTH) EVERY DAY    tramadol (ULTRAM) 50 mg tablet Take 1 tablet (50 mg total) by mouth every 6 (six) hours as needed for Pain.     Family History     Problem Relation (Age of Onset)    Cancer Brother    Heart disease Mother    Hyperlipidemia Brother    Hypertension Mother, Brother    Kidney disease Mother, Maternal Aunt    Parkinsonism Father        Social History Main Topics    Smoking status: Never Smoker    Smokeless tobacco: Never Used    Alcohol use No      Comment: Drank only while serving in [employer]    Drug use: No    Sexual activity: Yes     Partners: Female     Review of Systems   Constitutional: Positive for chills and fatigue. Negative for activity change, appetite change, diaphoresis and fever.   HENT: Negative for congestion, sinus pressure, sneezing, sore throat, tinnitus and trouble swallowing.    Eyes: Negative for pain, discharge and visual disturbance.   Respiratory: Negative.  Negative for cough, chest tightness and shortness of breath.    Cardiovascular: Negative.  Negative for chest pain, palpitations and leg swelling.   Gastrointestinal: Positive for nausea and vomiting. Negative for abdominal distention, abdominal pain and diarrhea.   Endocrine: Negative.  Negative for cold intolerance and heat intolerance.   Genitourinary: Positive for decreased urine volume. Negative for difficulty urinating, dysuria, flank pain, frequency and urgency.        Decreased UOP due to ESRD   Musculoskeletal: Negative.  Negative for arthralgias, back pain and myalgias.   Skin: Negative.  Negative for color change and wound.   Allergic/Immunologic: Negative.  Negative for environmental allergies, food allergies and immunocompromised state.   Neurological: Positive for headaches. Negative for dizziness, syncope and weakness.   Hematological: Does not bruise/bleed easily.   Psychiatric/Behavioral:  Negative.  Negative for agitation, confusion and sleep disturbance. The patient is not nervous/anxious.      Objective:     Vital Signs (Most Recent):  Temp: 98.2 °F (36.8 °C) (01/27/17 1754)  Pulse: 85 (01/27/17 2031)  Resp: 20 (01/27/17 2028)  BP: 115/70 (01/27/17 2031)  SpO2: 96 % (01/27/17 2031) Vital Signs (24h Range):  Temp:  [98.2 °F (36.8 °C)] 98.2 °F (36.8 °C)  Pulse:  [68-85] 85  Resp:  [18-20] 20  SpO2:  [96 %] 96 %  BP: (102-119)/(62-70) 115/70     Weight: 76.8 kg (169 lb 5 oz)  Body mass index is 25 kg/(m^2).    Physical Exam   Constitutional: He is oriented to person, place, and time. He appears well-developed and well-nourished.   HENT:   Head: Normocephalic.   Nose: Nose normal.   Mouth/Throat: Mucous membranes are dry.   Surgical incision present with sutures and old bloody drainage noted   Eyes: Conjunctivae and EOM are normal.   Neck: Normal range of motion. Neck supple.   Cardiovascular: Normal rate, regular rhythm, normal heart sounds and intact distal pulses.  Exam reveals no friction rub.    No murmur heard.  Pulmonary/Chest: Effort normal and breath sounds normal. No respiratory distress. He has no wheezes.   AICD to W.  Vas Cath [facility] to [facility]   Abdominal: Soft. Bowel sounds are normal. He exhibits no distension. There is no tenderness.   Musculoskeletal: Normal range of motion.   AVF to LUE.  +bruit and + thrill    Neurological: He is alert and oriented to person, place, and time.   Skin: Skin is warm and dry.   Multiple areas of dry and flaking skin with abrasions in multiple stages of healing noted to MITRA   Psychiatric: He has a normal mood and affect. His behavior is normal. Thought content normal.        Significant Labs:   CBC:   Recent Labs  Lab 01/27/17 1930   WBC 10.97   HGB 15.5   HCT 45.2   *     CMP:   Recent Labs  Lab 01/27/17 1930      K 4.3      CO2 24   *   BUN 22   CREATININE 4.3*   CALCIUM 10.4   PROT 7.9   ALBUMIN 3.3*   BILITOT 1.1*   ALKPHOS  131   AST 19   ALT 8*   ANIONGAP 13   EGFRNONAA 13*     Troponin:   Recent Labs  Lab 01/27/17  1930   TROPONINI 0.103*       Significant Imaging:   Imaging Results         CT Head Without Contrast (Final result) Result time:  01/27/17 19:59:33    Final result by Taisha Talamantes Jr., MD (01/27/17 19:59:33)    Impression:          No acute intracranial process    All CT scan at this facility use dose modulation, iterative reconstruction, and/or weight base dosing when appropriate to reduce radiation dose to as low as reasonably achievable.      Electronically signed by: TAISHA TALAMANTES  Date:     01/27/17  Time:    19:59     Narrative:    Reason: Headache.  Denies head trauma.  Swelling to the bridge of the nose.    Technique: Head CT without IV contrast.    Comparisons: 9/13/2016.    Findings:      Stable atrophy with periventricular white matter changes consistent with multivessel ischemic change.  No extra-axial acute fluid collection.  Normal gray-white differentiation without hemorrhage, mass effect, or midline shift. Ventricles and cisterns are within normal limits. No evidence of cerebral or cerebellar abnormalities.  Paranasal sinuses and mastoid air cells are clear.  Soft tissue swelling left frontal scalp.            X-Ray Chest 1 View (Final result) Result time:  01/27/17 19:29:48    Final result by Dylan Mendoza MD (01/27/17 19:29:48)    Impression:     No acute findings        Electronically signed by: DYLAN MENDOZA MD  Date:     01/27/17  Time:    19:29     Narrative:    History: Weakness, shortness of breath    Upper normal heart size. Right vas catheter in the SVC. An electrode overlies the left chest. This is demonstrated previously to be anterior to the sternum. No infiltrates.
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
## 2017-01-28 NOTE — ASSESSMENT & PLAN NOTE
-hx of-  -Echo on 10/25/16 showed EF 20% with diastolic dysfunction, moderate MVR, and PAP 55  -Coreg continued

## 2017-01-28 NOTE — ED NOTES
Pt resting comfortably in bed. Remains NSR on monitor. Denies complaints. Vss. nad observed.call light in reach.

## 2017-01-28 NOTE — CONSULTS
Consult Note  Cardiology    Consult Requested By:  Reason for Consult:     SUBJECTIVE:     History of Present Illness:  Patient is a 71 y.o. male presents with H/A, hypotension, abnormal troponin of .123. EKG no changes, NSR, LVH, nonspecific st- tchanges.  Stress test 10-16 nml.Patient denies CP, angina or anginal equivalent.    Review of patient's allergies indicates:   Allergen Reactions    Codeine Other (See Comments)     Hallucination    Hydrocodone Other (See Comments)     Impairs vision; AMS    Percocet [oxycodone-acetaminophen] Other (See Comments)     Makes him go crazy      Doxycycline Rash     Other reaction(s): Unknown    Levaquin [levofloxacin] Rash     Blisters    Xanax [alprazolam] Palpitations     pychosis        Past Medical History   Diagnosis Date    A-V fistula      RIGHT UPPER ARM    Anemia in chronic kidney disease 8/16/2012    Anticoagulant long-term use     Arthritis     Atrial flutter     Cancer      RIGHT RENAL CELL; SKIN CA    Chronic ischemic heart disease 8/20/2013    Chronic systolic congestive heart failure 4/12/2016    Coronary artery disease 8/16/2012    Depression     Dialysis patient      M-W-F    Elbow fracture     Encounter for blood transfusion     ESRD (end stage renal disease) on dialysis 8/16/2012    Fractured coccyx     Gout, unspecified 8/16/2012    Heel bone fracture     Hypertension 8/20/2013    Hypertension, renal 8/16/2012    Ischemic cardiomyopathy 8/20/2013    Kidney stones     Macular degeneration     Mixed hyperlipidemia 8/20/2013    Myocardial infarction     Neuromuscular disorder     JEROME (obstructive sleep apnea) 4/12/2016    JEROME on CPAP     Paroxysmal atrial flutter 5/12/2016    Personal history of skin cancer     Rheumatoid arthritis(714.0) 8/16/2012    Secondary hyperparathyroidism of renal origin 8/16/2012     Past Surgical History   Procedure Laterality Date    Partial nephrectomy Right 2008     for renal cell cancer -  ""stage 1" per patient.    Tonsillectomy      Av fistula placement       REVISION X2    Cardiac catheterization      Joint replacement Right      hip    Skin biopsy       Family History   Problem Relation Age of Onset    Hypertension Mother     Kidney disease Mother     Heart disease Mother     Parkinsonism Father     Cancer Brother      testicular cancer    Hyperlipidemia Brother     Hypertension Brother     Kidney disease Maternal Aunt      Social History   Substance Use Topics    Smoking status: Never Smoker    Smokeless tobacco: Never Used    Alcohol use No      Comment: Drank only while serving in [place]        Review of Systems:  Constitutional: negative  Eyes: negative  Ears, nose, mouth, throat, and face: negative  Respiratory: negative  Cardiovascular: negative  Gastrointestinal: negative  Genitourinary:negative  Hematologic/lymphatic: negative  Musculoskeletal:negative,   Neurological: negative  Behavioral/Psych: negative  Endocrine: negative  Allergic/Immunologic: negative    OBJECTIVE:     Vital Signs (Most Recent)  Temp: 98.4 °F (36.9 °C) (01/28/17 0555)  Pulse: 80 (01/28/17 0707)  Resp: 15 (01/28/17 0707)  BP: (!) 152/90 (01/28/17 0707)  SpO2: 98 % (01/28/17 0707)    Vital Signs Range (Last 24H):  Temp:  [98.2 °F (36.8 °C)-98.4 °F (36.9 °C)]   Pulse:  [68-86]   Resp:  [15-20]   BP: (102-153)/(26-97)   SpO2:  [95 %-99 %]     Current Facility-Administered Medications   Medication    acetaminophen tablet 650 mg    atorvastatin tablet 40 mg    carvedilol tablet 12.5 mg    cetirizine tablet 10 mg    hyaluronate sodium, stabilized Syrg 4 mL    nitroGLYCERIN SL tablet 0.4 mg    ondansetron injection 4 mg    pantoprazole EC tablet 40 mg    phytonadione (vitamin K1) tablet 5 mg    sevelamer carbonate tablet 800 mg     Current Outpatient Prescriptions   Medication Sig    amiodarone (PACERONE) 400 MG tablet Take 1 tablet (400 mg total) by mouth 2 (two) times daily. (Patient taking " differently: Take 200 mg by mouth once daily. )    aspirin (ECOTRIN) 81 MG EC tablet Take 81 mg by mouth once daily.    atorvastatin (LIPITOR) 40 MG tablet TAKE 1 TABLET BY MOUTH EVERY EVENING    docusate sodium (COLACE) 100 MG capsule Take 1 capsule (100 mg total) by mouth 3 (three) times daily as needed for Constipation.    isosorbide mononitrate (IMDUR) 30 MG 24 hr tablet Take 1 tablet (30 mg total) by mouth once daily.    loratadine (CLARITIN) 10 mg tablet Take 10 mg by mouth once daily.    pantoprazole (PROTONIX) 40 MG tablet Take 1 tablet (40 mg total) by mouth once daily.    pyridoxine (VITAMIN B-6) 200 mg Tab Take 1 tablet by mouth every evening.     RANEXA 500 mg Tb12 TAKE 1 TABLET TWICE A DAY    sertraline (ZOLOFT) 100 MG tablet Take 50 mg by mouth daily as needed.    sevelamer carbonate (RENVELA) 800 mg Tab Take 1 tablet (800 mg total) by mouth 3 (three) times daily with meals. (Patient taking differently: Take 800 mg by mouth 3 (three) times daily. )    thiamine 100 MG tablet Take 1 tablet (100 mg total) by mouth once daily.    acetaminophen (TYLENOL) 500 MG tablet Take 500 mg by mouth every 6 (six) hours as needed for Pain.    acitretin (SORIATANE) 10 MG capsule Take 10 mg by mouth every evening.     allopurinol (ZYLOPRIM) 100 MG tablet Take 1 tablet (100 mg total) by mouth once daily.    carvedilol (COREG) 25 MG tablet Take 0.5 tablets (12.5 mg total) by mouth 2 (two) times daily with meals.    mupirocin (BACTROBAN) 2 % ointment Apply to wound infection 2 times a week for 7 days (Patient taking differently: Apply topically as needed. Apply to wound infection 2 times a week for 7 days)    nitroGLYCERIN (NITROSTAT) 0.4 MG SL tablet Place 1 tablet (0.4 mg total) under the tongue every 5 (five) minutes as needed for Chest pain.    predniSONE (DELTASONE) 5 MG tablet TAKE 1 TABLET(S) ORAL (BY MOUTH) EVERY DAY    tramadol (ULTRAM) 50 mg tablet Take 1 tablet (50 mg total) by mouth every 6  (six) hours as needed for Pain.     Current Facility-Administered Medications on File Prior to Encounter   Medication    hyaluronate sodium, stabilized Syrg 4 mL    phytonadione (vitamin K1) tablet 5 mg     Current Outpatient Prescriptions on File Prior to Encounter   Medication Sig    amiodarone (PACERONE) 400 MG tablet Take 1 tablet (400 mg total) by mouth 2 (two) times daily. (Patient taking differently: Take 200 mg by mouth once daily. )    aspirin (ECOTRIN) 81 MG EC tablet Take 81 mg by mouth once daily.    atorvastatin (LIPITOR) 40 MG tablet TAKE 1 TABLET BY MOUTH EVERY EVENING    docusate sodium (COLACE) 100 MG capsule Take 1 capsule (100 mg total) by mouth 3 (three) times daily as needed for Constipation.    isosorbide mononitrate (IMDUR) 30 MG 24 hr tablet Take 1 tablet (30 mg total) by mouth once daily.    loratadine (CLARITIN) 10 mg tablet Take 10 mg by mouth once daily.    pantoprazole (PROTONIX) 40 MG tablet Take 1 tablet (40 mg total) by mouth once daily.    pyridoxine (VITAMIN B-6) 200 mg Tab Take 1 tablet by mouth every evening.     RANEXA 500 mg Tb12 TAKE 1 TABLET TWICE A DAY    sertraline (ZOLOFT) 100 MG tablet Take 50 mg by mouth daily as needed.    sevelamer carbonate (RENVELA) 800 mg Tab Take 1 tablet (800 mg total) by mouth 3 (three) times daily with meals. (Patient taking differently: Take 800 mg by mouth 3 (three) times daily. )    thiamine 100 MG tablet Take 1 tablet (100 mg total) by mouth once daily.    acetaminophen (TYLENOL) 500 MG tablet Take 500 mg by mouth every 6 (six) hours as needed for Pain.    acitretin (SORIATANE) 10 MG capsule Take 10 mg by mouth every evening.     allopurinol (ZYLOPRIM) 100 MG tablet Take 1 tablet (100 mg total) by mouth once daily.    carvedilol (COREG) 25 MG tablet Take 0.5 tablets (12.5 mg total) by mouth 2 (two) times daily with meals.    mupirocin (BACTROBAN) 2 % ointment Apply to wound infection 2 times a week for 7 days (Patient  taking differently: Apply topically as needed. Apply to wound infection 2 times a week for 7 days)    nitroGLYCERIN (NITROSTAT) 0.4 MG SL tablet Place 1 tablet (0.4 mg total) under the tongue every 5 (five) minutes as needed for Chest pain.    predniSONE (DELTASONE) 5 MG tablet TAKE 1 TABLET(S) ORAL (BY MOUTH) EVERY DAY    tramadol (ULTRAM) 50 mg tablet Take 1 tablet (50 mg total) by mouth every 6 (six) hours as needed for Pain.       Physical Exam:  General appearance: alert, appears stated age and cooperative  Head: Normocephalic, without obvious abnormality, atraumatic  Eyes:  conjunctivae/corneas clear. PERRL, EOM's intact. Fundi benign.  Nose: no discharge  Throat: normal findings: tongue midline and normal  Neck: no adenopathy, no carotid bruit, no JVD, supple, symmetrical, trachea midline and thyroid not enlarged, symmetric, no tenderness/mass/nodules  Back:  no skin lesions, erythema, or scars  Lungs:  clear to auscultation bilaterally  Chest wall: no tenderness  Heart: regular rate and rhythm, S1, S2 normal, no murmur, click, rub or gallop  Abdomen: soft, non-tender; bowel sounds normal; no masses,  no organomegaly  Extremities: extremities normal, atraumatic, no cyanosis or edema  Pulses: 2+ and symmetric  Skin: Skin color, texture, turgor normal. No rashes or lesions  Neurologic: Grossly normal    Laboratory:  Chemistry:   Lab Results   Component Value Date     01/28/2017    K 4.6 01/28/2017     01/28/2017    CO2 20 (L) 01/28/2017    BUN 32 (H) 01/28/2017    CREATININE 5.3 (H) 01/28/2017    CALCIUM 10.3 01/28/2017     Cardiac Markers:   Lab Results   Component Value Date    CKMB 0.9 10/08/2012    TROPONINI 0.114 (H) 01/28/2017    TROPONINI 0.04 10/08/2012     Cardiac Markers (Last 3):   Lab Results   Component Value Date    CKMB 0.9 10/08/2012    CKMB 2.4 08/03/2011    CKMB 3.0 08/01/2011    TROPONINI 0.114 (H) 01/28/2017    TROPONINI 0.123 (H) 01/28/2017    TROPONINI 0.103 (H) 01/27/2017     TROPONINI 0.04 10/08/2012    TROPONINI 0.21 (H) 08/03/2011    TROPONINI 0.92 (H) 08/01/2011     CBC:   Lab Results   Component Value Date    WBC 9.55 01/28/2017    HGB 15.8 01/28/2017    HCT 45.8 01/28/2017    HCT 35 (L) 12/27/2016    MCV 96 01/28/2017     (L) 01/28/2017     Lipids:   Lab Results   Component Value Date    CHOL 112 (L) 05/12/2016    TRIG 114 05/12/2016    HDL 29 (L) 05/12/2016     Coagulation:   Lab Results   Component Value Date    INR 1.1 01/27/2017    INR 1.1 (A) 11/15/2016    APTT 31.2 01/27/2017       Diagnostic Results:  ECG: Reviewed  X-Ray: Reviewed  US: Reviewed  CT: Reviewed  Echo: Reviewed      ASSESSMENT/PLAN:     Patient Active Problem List   Diagnosis    Organ transplant candidate    Hypertension, renal    Rheumatoid arthritis involving both hands with negative rheumatoid factor    Coronary artery disease    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    Secondary hyperparathyroidism of renal origin    Refractive error - Both Eyes    Chronic ischemic heart disease    Mixed hyperlipidemia    Ischemic cardiomyopathy    Abnormal ECG    Skin cancer of scalp    ESRD (end stage renal disease)    Knee pain    Osteoporosis    Essential hypertension    A-V fistula    Problem with dialysis shunt    JEROME (obstructive sleep apnea)    Chronic systolic congestive heart failure    GERD (gastroesophageal reflux disease)    Syncope    LVH (left ventricular hypertrophy)    Pulmonary HTN    Paroxysmal atrial flutter    Cervical spinal stenosis    Atrial flutter with rapid ventricular response    Back pain    Complication of AV dialysis fistula    Current chronic use of systemic steroids    SVT (supraventricular tachycardia)    NSVT (nonsustained ventricular tachycardia)    Hematoma of arm    Renal failure    Osteoarthritis of right knee    S/P implantation of automatic cardioverter/defibrillator (AICD)    Elevated troponin I level     Nausea & vomiting        Plan: (+) troponin multifactorial , no clinical evidence of ACS, probably secondary to hypotension, ESRD            Check echo, other wise no further cardiac w/u
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
## 2017-01-28 NOTE — ED NOTES
Pt resting in bed. rr /eu. Skin warm and dry. Vss. Pt denies complaints. NSR on monitor. Denies cp, sob or HA. Call light in reach. Pt denies needs or questions on pc.

## 2017-01-28 NOTE — ASSESSMENT & PLAN NOTE
-Coreg and Amiodarone continued  -EKG abnormal and shows NSR with changes  -AICD placed 12/27/16

## 2017-01-28 NOTE — ED NOTES
"Pt presents to ER for intermittent nausea and HA that started yesterday. Reports 2 episodes of vomiting. Denies sob, cp. Dizziness or weakness. Is a dialysis pt that got dialysis today without "any problems" reports recent skin cancer removed from left frontal scalp. + healing incision noted with dry blood. Pt wife reports" taking bandage off today" denies falls or trauma. On blood thinners.   "

## 2017-01-28 NOTE — ASSESSMENT & PLAN NOTE
-Nephrology consulted  -HD today with 2L removed via RSC Vas Cath  -AVF to LUE - not being used due to malfunction  -pt followed outpatient by Dr. Reilly (Nephrology)

## 2017-01-28 NOTE — ASSESSMENT & PLAN NOTE
-hx of chronically elevated troponin with baseline 0.09-0.06  -will follow serial troponins  -Cardiology consulted in am

## 2017-01-28 NOTE — ED NOTES
Received call back from Shannon in echo, echo order cancelled after echo tech spoke to Dr. Vang; pt's last echo 3 months ago, EF 20%, pt does not need echo.  JEAN-CLAUDE Uriostegui NP notified.  Will see pt.

## 2017-02-02 ENCOUNTER — PATIENT MESSAGE (OUTPATIENT)
Dept: NEPHROLOGY | Facility: CLINIC | Age: 72
End: 2017-02-02

## 2017-02-02 ENCOUNTER — TELEPHONE (OUTPATIENT)
Dept: RHEUMATOLOGY | Facility: CLINIC | Age: 72
End: 2017-02-02

## 2017-02-02 ENCOUNTER — PATIENT MESSAGE (OUTPATIENT)
Dept: CARDIOLOGY | Facility: CLINIC | Age: 72
End: 2017-02-02

## 2017-02-02 NOTE — TELEPHONE ENCOUNTER
----- Message from Nadya Dixon sent at 2/2/2017 11:12 AM CST -----  [facility], [phone] needs a copy of the pt's most recent labs faxed to [phone]

## 2017-02-03 DIAGNOSIS — M1A.9XX0 CHRONIC GOUT WITHOUT TOPHUS, UNSPECIFIED CAUSE, UNSPECIFIED SITE: ICD-10-CM

## 2017-02-03 RX ORDER — ALLOPURINOL 100 MG/1
100 TABLET ORAL DAILY
Qty: 90 TABLET | Refills: 3
Start: 2017-02-03 | End: 2017-02-09

## 2017-02-09 ENCOUNTER — OFFICE VISIT (OUTPATIENT)
Dept: INTERNAL MEDICINE | Facility: CLINIC | Age: 72
End: 2017-02-09
Payer: COMMERCIAL

## 2017-02-09 ENCOUNTER — HOSPITAL ENCOUNTER (OUTPATIENT)
Dept: RADIOLOGY | Facility: HOSPITAL | Age: 72
Discharge: HOME | End: 2017-02-09
Attending: INTERNAL MEDICINE
Payer: COMMERCIAL

## 2017-02-09 VITALS
WEIGHT: 172.19 LBS | HEIGHT: 69 IN | HEART RATE: 71 BPM | TEMPERATURE: 97 F | DIASTOLIC BLOOD PRESSURE: 72 MMHG | OXYGEN SATURATION: 97 % | BODY MASS INDEX: 25.5 KG/M2 | SYSTOLIC BLOOD PRESSURE: 110 MMHG

## 2017-02-09 DIAGNOSIS — I10 ESSENTIAL HYPERTENSION: ICD-10-CM

## 2017-02-09 DIAGNOSIS — G89.29 ACUTE EXACERBATION OF CHRONIC LOW BACK PAIN: Primary | ICD-10-CM

## 2017-02-09 DIAGNOSIS — M54.50 ACUTE EXACERBATION OF CHRONIC LOW BACK PAIN: Primary | ICD-10-CM

## 2017-02-09 DIAGNOSIS — N18.6 ESRD (END STAGE RENAL DISEASE): Chronic | ICD-10-CM

## 2017-02-09 DIAGNOSIS — G89.29 CHRONIC MIDLINE LOW BACK PAIN WITHOUT SCIATICA: ICD-10-CM

## 2017-02-09 DIAGNOSIS — M62.838 MUSCLE SPASM: ICD-10-CM

## 2017-02-09 DIAGNOSIS — M54.16 RIGHT LUMBAR RADICULITIS: ICD-10-CM

## 2017-02-09 DIAGNOSIS — I48.92 ATRIAL FLUTTER WITH RAPID VENTRICULAR RESPONSE: ICD-10-CM

## 2017-02-09 DIAGNOSIS — M54.50 CHRONIC MIDLINE LOW BACK PAIN WITHOUT SCIATICA: ICD-10-CM

## 2017-02-09 PROCEDURE — 1125F AMNT PAIN NOTED PAIN PRSNT: CPT | Mod: S$GLB,,, | Performed by: INTERNAL MEDICINE

## 2017-02-09 PROCEDURE — 1157F ADVNC CARE PLAN IN RCRD: CPT | Mod: S$GLB,,, | Performed by: INTERNAL MEDICINE

## 2017-02-09 PROCEDURE — 99214 OFFICE O/P EST MOD 30 MIN: CPT | Mod: S$GLB,,, | Performed by: INTERNAL MEDICINE

## 2017-02-09 PROCEDURE — 72100 X-RAY EXAM L-S SPINE 2/3 VWS: CPT | Mod: 26,,, | Performed by: RADIOLOGY

## 2017-02-09 PROCEDURE — 99999 PR PBB SHADOW E&M-EST. PATIENT-LVL III: CPT | Mod: PBBFAC,,, | Performed by: INTERNAL MEDICINE

## 2017-02-09 PROCEDURE — 1159F MED LIST DOCD IN RCRD: CPT | Mod: S$GLB,,, | Performed by: INTERNAL MEDICINE

## 2017-02-09 PROCEDURE — 1160F RVW MEDS BY RX/DR IN RCRD: CPT | Mod: S$GLB,,, | Performed by: INTERNAL MEDICINE

## 2017-02-09 PROCEDURE — 72100 X-RAY EXAM L-S SPINE 2/3 VWS: CPT | Mod: TC,PO

## 2017-02-09 RX ORDER — METHOCARBAMOL 750 MG/1
750 TABLET, FILM COATED ORAL NIGHTLY PRN
Qty: 30 TABLET | Refills: 0 | Status: SHIPPED | OUTPATIENT
Start: 2017-02-09 | End: 2017-02-19

## 2017-02-09 RX ORDER — METHYLPREDNISOLONE 4 MG/1
TABLET ORAL
Qty: 1 PACKAGE | Refills: 0 | Status: ON HOLD | OUTPATIENT
Start: 2017-02-09 | End: 2017-03-16 | Stop reason: HOSPADM

## 2017-02-09 RX ORDER — CLONIDINE HYDROCHLORIDE 0.3 MG/1
0.3 TABLET ORAL
Status: ON HOLD | COMMUNITY
End: 2017-04-25 | Stop reason: HOSPADM

## 2017-02-09 RX ORDER — CLOPIDOGREL BISULFATE 75 MG/1
75 TABLET ORAL DAILY
COMMUNITY
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
## 2017-02-09 NOTE — MR AVS SNAPSHOT
[facility] Internal Medicine  [address] [place] [address]  Phone: [phone]  Fax: [phone]                  Quinn Hutchins   2017 4:00 PM   Office Visit    Description:  Male : 1945   Provider:  Jameel Almaguer MD   Department:  [facility] - Internal Medicine           Reason for Visit     Hospital Follow Up           Diagnoses this Visit        Comments    Essential hypertension    -  Primary     Atrial flutter with rapid ventricular response         ESRD (end stage renal disease)         Chronic midline low back pain without sciatica         Chronic right-sided low back pain with right-sided sciatica         Muscle spasm                To Do List           Future Appointments        Provider Department Dept Phone    2017 5:30 PM [facility] XR2 [facility] [phone]    3/28/2017 1:30 PM LABORATORY, [facility] [phone]    3/28/2017 2:20 PM Darron Anne MD [facility] Cardiology [phone]    3/28/2017 3:00 PM Denisha Manzano PA-C [facility] Rheumatology [phone]    2017 11:10 AM LABORATORY, [address] [facility] [phone]      Goals (5 Years of Data)     Continue renal heart healthy diet.       Follow-Up and Disposition     Return in about 6 months (around 2017), or if symptoms worsen or fail to improve.       These Medications        Disp Refills Start End    methylPREDNISolone (MEDROL, LIANNE,) 4 mg tablet 1 Package 0 2017     use as directed    Pharmacy: [facility]/pharmacy #[id number] [id number] [address] [facility] Ph #: [phone]       methocarbamol (ROBAXIN) 750 MG Tab 30 tablet 0 2017    Take 1 tablet (750 mg total) by mouth nightly as needed. For muscle spasm - Oral    Pharmacy: [facility]/pharmacy #[id number] [facility] [id number] [facility] Ph #: [phone]         [facility]     [facility] Care [facility] -  Assistance  Registered nurses in the [facility] provide  clinical advisement, health education, appointment booking, and other advisory services.  Call for this free service at [phone].             Medications           Message regarding Medications     Verify the changes and/or additions to your medication regime listed below are the same as discussed with your clinician today.  If any of these changes or additions are incorrect, please notify your healthcare provider.        START taking these NEW medications        Refills    methylPREDNISolone (MEDROL, LIANNE,) 4 mg tablet 0    Sig: use as directed    Class: Normal    methocarbamol (ROBAXIN) 750 MG Tab 0    Sig: Take 1 tablet (750 mg total) by mouth nightly as needed. For muscle spasm    Class: Normal    Route: Oral      STOP taking these medications     allopurinol (ZYLOPRIM) 100 MG tablet Take 1 tablet (100 mg total) by mouth once daily.           Verify that the below list of medications is an accurate representation of the medications you are currently taking.  If none reported, the list may be blank. If incorrect, please contact your healthcare provider. Carry this list with you in case of emergency.           Current Medications     acetaminophen (TYLENOL) 500 MG tablet Take 500 mg by mouth every 6 (six) hours as needed for Pain.    acitretin (SORIATANE) 10 MG capsule Take 10 mg by mouth every evening.     amiodarone (PACERONE) 400 MG tablet Take 1 tablet (400 mg total) by mouth 2 (two) times daily.    aspirin (ECOTRIN) 81 MG EC tablet Take 81 mg by mouth once daily.    atorvastatin (LIPITOR) 40 MG tablet TAKE 1 TABLET BY MOUTH EVERY EVENING    carvedilol (COREG) 25 MG tablet Take 0.5 tablets (12.5 mg total) by mouth 2 (two) times daily with meals.    clopidogrel (PLAVIX) 75 mg tablet Take 75 mg by mouth once daily.    docusate sodium (COLACE) 100 MG capsule Take 1 capsule (100 mg total) by mouth 3 (three) times daily as needed for Constipation.    isosorbide mononitrate (IMDUR) 30 MG 24 hr tablet Take 1  "tablet (30 mg total) by mouth once daily.    loratadine (CLARITIN) 10 mg tablet Take 10 mg by mouth once daily.    mupirocin (BACTROBAN) 2 % ointment Apply to wound infection 2 times a week for 7 days    nitroGLYCERIN (NITROSTAT) 0.4 MG SL tablet Place 1 tablet (0.4 mg total) under the tongue every 5 (five) minutes as needed for Chest pain.    ondansetron (ZOFRAN-ODT) 4 MG TbDL Take 2 tablets (8 mg total) by mouth every 8 (eight) hours as needed.    pantoprazole (PROTONIX) 40 MG tablet Take 1 tablet (40 mg total) by mouth once daily.    predniSONE (DELTASONE) 5 MG tablet TAKE 1 TABLET(S) ORAL (BY MOUTH) EVERY DAY    pyridoxine (VITAMIN B-6) 200 mg Tab Take 1 tablet by mouth every evening.     RANEXA 500 mg Tb12 TAKE 1 TABLET TWICE A DAY    sertraline (ZOLOFT) 100 MG tablet Take 50 mg by mouth once daily.     sevelamer carbonate (RENVELA) 800 mg Tab Take 1 tablet (800 mg total) by mouth 3 (three) times daily with meals.    thiamine 100 MG tablet Take 1 tablet (100 mg total) by mouth once daily.    tramadol (ULTRAM) 50 mg tablet Take 1 tablet (50 mg total) by mouth every 6 (six) hours as needed for Pain.    cloNIDine (CATAPRES) 0.3 MG tablet Take 0.3 mg by mouth as needed.    methocarbamol (ROBAXIN) 750 MG Tab Take 1 tablet (750 mg total) by mouth nightly as needed. For muscle spasm    methylPREDNISolone (MEDROL, LIANNE,) 4 mg tablet use as directed           Clinical Reference Information           Your Vitals Were     BP Pulse Temp Height Weight SpO2    110/72 (BP Location: Right arm, Patient Position: Sitting) 71 97.3 °F (36.3 °C) (Tympanic) 5' 9" (1.753 m) 78.1 kg (172 lb 2.9 oz) 97%    BMI                25.43 kg/m2          Blood Pressure          Most Recent Value    BP  110/72      Allergies as of 2/9/2017     Codeine    Hydrocodone    Percocet [Oxycodone-acetaminophen]    Doxycycline    Levaquin [Levofloxacin]    Xanax [Alprazolam]      Immunizations Administered on Date of Encounter - 2/9/2017     None    "   Orders Placed During Today's Visit     Future Labs/Procedures Expected by Expires    X-Ray Lumbar Spine Ap And Lateral  2/9/2017 2/9/2018      Maintenance Dialysis History     Start End Type Comments Center    10/8/2012  [place]  [facility]            Current Dialysis Center Information     [facility] [address] Phone #:  [phone]    Contact:  N/A [place]  [address] Fax #:  [phone]            Language Assistance Services     ATTENTION: Language assistance services are available, free of charge. Please call [phone].      ATENCIÓN: Si habla español, tiene a koehler disposición servicios gratuitos de asistencia lingüística. Llame al [phone].     CHÚ Ý: N?u b?n nói Ti?ng Vi?t, có các d?ch v? h? tr? ngôn ng? mi?n phí dành cho b?n. G?i s? [phone].         [facility] - [facility] complies with applicable Federal civil rights laws and does not discriminate on the basis of race, color, national origin, age, disability, or sex.

## 2017-03-01 ENCOUNTER — TELEPHONE (OUTPATIENT)
Dept: CARDIOLOGY | Facility: CLINIC | Age: 72
End: 2017-03-01

## 2017-03-12 ENCOUNTER — HOSPITAL ENCOUNTER (INPATIENT)
Facility: HOSPITAL | Age: 72
LOS: 4 days | Discharge: NURSING, REHAB OR LONG-TERM CARE | DRG: 480 | End: 2017-03-16
Attending: INTERNAL MEDICINE | Admitting: INTERNAL MEDICINE
Payer: COMMERCIAL

## 2017-03-12 DIAGNOSIS — N18.6 ESRD (END STAGE RENAL DISEASE): ICD-10-CM

## 2017-03-12 DIAGNOSIS — E87.5 HYPERKALEMIA: ICD-10-CM
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
## 2017-03-12 DIAGNOSIS — S72.002A CLOSED LEFT HIP FRACTURE, INITIAL ENCOUNTER: Primary | ICD-10-CM

## 2017-03-12 DIAGNOSIS — I10 ESSENTIAL HYPERTENSION: ICD-10-CM

## 2017-03-12 LAB
ANION GAP SERPL CALC-SCNC: 16 MMOL/L
APTT BLDCRRT: 26.8 SEC
BASOPHILS # BLD AUTO: 0.02 K/UL
BASOPHILS NFR BLD: 0.2 %
BUN SERPL-MCNC: 61 MG/DL
CALCIUM SERPL-MCNC: 9.7 MG/DL
CHLORIDE SERPL-SCNC: 101 MMOL/L
CO2 SERPL-SCNC: 25 MMOL/L
CREAT SERPL-MCNC: 7.9 MG/DL
DIFFERENTIAL METHOD: ABNORMAL
EOSINOPHIL # BLD AUTO: 0.3 K/UL
EOSINOPHIL NFR BLD: 2.5 %
ERYTHROCYTE [DISTWIDTH] IN BLOOD BY AUTOMATED COUNT: 18.8 %
EST. GFR  (AFRICAN AMERICAN): 7 ML/MIN/1.73 M^2
EST. GFR  (NON AFRICAN AMERICAN): 6 ML/MIN/1.73 M^2
GLUCOSE SERPL-MCNC: 90 MG/DL
HCT VFR BLD AUTO: 42.4 %
HGB BLD-MCNC: 14.2 G/DL
INR PPP: 1
LYMPHOCYTES # BLD AUTO: 1.1 K/UL
LYMPHOCYTES NFR BLD: 10.1 %
MCH RBC QN AUTO: 33.4 PG
MCHC RBC AUTO-ENTMCNC: 33.5 %
MCV RBC AUTO: 100 FL
MONOCYTES # BLD AUTO: 1.2 K/UL
MONOCYTES NFR BLD: 10.9 %
NEUTROPHILS # BLD AUTO: 8.6 K/UL
NEUTROPHILS NFR BLD: 76.3 %
PLATELET # BLD AUTO: 176 K/UL
PMV BLD AUTO: 9.9 FL
POTASSIUM SERPL-SCNC: 4.8 MMOL/L
PROTHROMBIN TIME: 10.4 SEC
RBC # BLD AUTO: 4.25 M/UL
SODIUM SERPL-SCNC: 142 MMOL/L
WBC # BLD AUTO: 11.24 K/UL

## 2017-03-12 PROCEDURE — 63600175 PHARM REV CODE 636 W HCPCS: Performed by: INTERNAL MEDICINE

## 2017-03-12 PROCEDURE — 96372 THER/PROPH/DIAG INJ SC/IM: CPT

## 2017-03-12 PROCEDURE — 96374 THER/PROPH/DIAG INJ IV PUSH: CPT

## 2017-03-12 PROCEDURE — 25000003 PHARM REV CODE 250: Performed by: INTERNAL MEDICINE

## 2017-03-12 PROCEDURE — 85730 THROMBOPLASTIN TIME PARTIAL: CPT

## 2017-03-12 PROCEDURE — 99285 EMERGENCY DEPT VISIT HI MDM: CPT | Mod: 25

## 2017-03-12 PROCEDURE — 85610 PROTHROMBIN TIME: CPT

## 2017-03-12 PROCEDURE — 80048 BASIC METABOLIC PNL TOTAL CA: CPT

## 2017-03-12 PROCEDURE — 11000001 HC ACUTE MED/SURG PRIVATE ROOM

## 2017-03-12 PROCEDURE — 85025 COMPLETE CBC W/AUTO DIFF WBC: CPT

## 2017-03-12 RX ORDER — AMIODARONE HYDROCHLORIDE 200 MG/1
200 TABLET ORAL DAILY
Status: DISCONTINUED | OUTPATIENT
Start: 2017-03-13 | End: 2017-03-16 | Stop reason: HOSPADM

## 2017-03-12 RX ORDER — IPRATROPIUM BROMIDE AND ALBUTEROL SULFATE 2.5; .5 MG/3ML; MG/3ML
3 SOLUTION RESPIRATORY (INHALATION) EVERY 6 HOURS PRN
Status: DISCONTINUED | OUTPATIENT
Start: 2017-03-12 | End: 2017-03-16 | Stop reason: HOSPADM

## 2017-03-12 RX ORDER — HYDROMORPHONE HYDROCHLORIDE 2 MG/ML
1 INJECTION, SOLUTION INTRAMUSCULAR; INTRAVENOUS; SUBCUTANEOUS
Status: COMPLETED | OUTPATIENT
Start: 2017-03-12 | End: 2017-03-12

## 2017-03-12 RX ORDER — PANTOPRAZOLE SODIUM 40 MG/1
40 TABLET, DELAYED RELEASE ORAL DAILY
Status: DISCONTINUED | OUTPATIENT
Start: 2017-03-13 | End: 2017-03-16 | Stop reason: HOSPADM

## 2017-03-12 RX ORDER — RAMELTEON 8 MG/1
8 TABLET ORAL NIGHTLY PRN
Status: DISCONTINUED | OUTPATIENT
Start: 2017-03-12 | End: 2017-03-16 | Stop reason: HOSPADM

## 2017-03-12 RX ORDER — ISOSORBIDE MONONITRATE 30 MG/1
30 TABLET, EXTENDED RELEASE ORAL DAILY
Status: DISCONTINUED | OUTPATIENT
Start: 2017-03-13 | End: 2017-03-16 | Stop reason: HOSPADM

## 2017-03-12 RX ORDER — CARVEDILOL 12.5 MG/1
12.5 TABLET ORAL 2 TIMES DAILY WITH MEALS
Status: DISCONTINUED | OUTPATIENT
Start: 2017-03-13 | End: 2017-03-15

## 2017-03-12 RX ORDER — SODIUM CHLORIDE 9 MG/ML
3 INJECTION, SOLUTION INTRAMUSCULAR; INTRAVENOUS; SUBCUTANEOUS EVERY 8 HOURS
Status: DISCONTINUED | OUTPATIENT
Start: 2017-03-13 | End: 2017-03-16 | Stop reason: HOSPADM

## 2017-03-12 RX ORDER — FAMOTIDINE 20 MG/1
20 TABLET, FILM COATED ORAL DAILY
Status: DISCONTINUED | OUTPATIENT
Start: 2017-03-13 | End: 2017-03-12

## 2017-03-12 RX ORDER — SEVELAMER CARBONATE 800 MG/1
800 TABLET, FILM COATED ORAL
Status: DISCONTINUED | OUTPATIENT
Start: 2017-03-13 | End: 2017-03-16 | Stop reason: HOSPADM

## 2017-03-12 RX ORDER — DOCUSATE SODIUM 100 MG/1
100 CAPSULE, LIQUID FILLED ORAL 3 TIMES DAILY PRN
Status: DISCONTINUED | OUTPATIENT
Start: 2017-03-12 | End: 2017-03-16 | Stop reason: HOSPADM

## 2017-03-12 RX ORDER — RANOLAZINE 500 MG/1
500 TABLET, EXTENDED RELEASE ORAL 2 TIMES DAILY
Status: DISCONTINUED | OUTPATIENT
Start: 2017-03-12 | End: 2017-03-16 | Stop reason: HOSPADM

## 2017-03-12 RX ORDER — HYDROMORPHONE HYDROCHLORIDE 2 MG/ML
1 INJECTION, SOLUTION INTRAMUSCULAR; INTRAVENOUS; SUBCUTANEOUS EVERY 4 HOURS PRN
Status: DISCONTINUED | OUTPATIENT
Start: 2017-03-12 | End: 2017-03-14

## 2017-03-12 RX ORDER — CLOPIDOGREL BISULFATE 75 MG/1
75 TABLET ORAL DAILY
Status: DISCONTINUED | OUTPATIENT
Start: 2017-03-13 | End: 2017-03-16 | Stop reason: HOSPADM

## 2017-03-12 RX ORDER — THIAMINE HCL 100 MG
100 TABLET ORAL DAILY
Status: DISCONTINUED | OUTPATIENT
Start: 2017-03-13 | End: 2017-03-16 | Stop reason: HOSPADM

## 2017-03-12 RX ORDER — SERTRALINE HYDROCHLORIDE 50 MG/1
50 TABLET, FILM COATED ORAL DAILY
Status: DISCONTINUED | OUTPATIENT
Start: 2017-03-13 | End: 2017-03-16 | Stop reason: HOSPADM

## 2017-03-12 RX ORDER — PREDNISONE 5 MG/1
5 TABLET ORAL DAILY
Status: DISCONTINUED | OUTPATIENT
Start: 2017-03-13 | End: 2017-03-16 | Stop reason: HOSPADM

## 2017-03-12 RX ORDER — ASPIRIN 81 MG/1
81 TABLET ORAL DAILY
Status: DISCONTINUED | OUTPATIENT
Start: 2017-03-13 | End: 2017-03-16 | Stop reason: HOSPADM

## 2017-03-12 RX ORDER — ATORVASTATIN CALCIUM 40 MG/1
40 TABLET, FILM COATED ORAL NIGHTLY
Status: DISCONTINUED | OUTPATIENT
Start: 2017-03-13 | End: 2017-03-16 | Stop reason: HOSPADM

## 2017-03-12 RX ORDER — ONDANSETRON 8 MG/1
8 TABLET, ORALLY DISINTEGRATING ORAL EVERY 8 HOURS PRN
Status: DISCONTINUED | OUTPATIENT
Start: 2017-03-12 | End: 2017-03-16 | Stop reason: HOSPADM

## 2017-03-12 RX ORDER — FAMOTIDINE 20 MG/1
20 TABLET, FILM COATED ORAL 2 TIMES DAILY
Status: DISCONTINUED | OUTPATIENT
Start: 2017-03-12 | End: 2017-03-12

## 2017-03-12 RX ADMIN — HYDROMORPHONE HYDROCHLORIDE 1 MG: 2 INJECTION, SOLUTION INTRAMUSCULAR; INTRAVENOUS; SUBCUTANEOUS at 08:03

## 2017-03-12 RX ADMIN — HYDROMORPHONE HYDROCHLORIDE 1 MG: 2 INJECTION, SOLUTION INTRAMUSCULAR; INTRAVENOUS; SUBCUTANEOUS at 06:03

## 2017-03-12 RX ADMIN — RANOLAZINE 500 MG: 500 TABLET, FILM COATED, EXTENDED RELEASE ORAL at 10:03

## 2017-03-12 NOTE — IP AVS SNAPSHOT
[address] [place] [address]           Patient Discharge Instructions     Our goal is to set you up for success. This packet includes information on your condition, medications, and your home care. It will help you to care for yourself so you don't get sicker and need to go back to the hospital.     Please ask your nurse if you have any questions.        There are many details to remember when preparing to leave the hospital. Here is what you will need to do:    1. Take your medicine. If you are prescribed medications, review your Medication List in the following pages. You may have new medications to  at the pharmacy and others that you'll need to stop taking. Review the instructions for how and when to take your medications. Talk with your doctor or nurses if you are unsure of what to do.     2. Go to your follow-up appointments. Specific follow-up information is listed in the following pages. Your may be contacted by a transition nurse or clinical provider about future appointments. Be sure we have all of the phone numbers to reach you, if needed. Please contact your provider's office if you are unable to make an appointment.     3. Watch for warning signs. Your doctor or nurse will give you detailed warning signs to watch for and when to call for assistance. These instructions may also include educational information about your condition. If you experience any of warning signs to your health, call your doctor.               ** Verify the list of medication(s) below is accurate and up to date. Carry this with you in case of emergency. If your medications have changed, please notify your healthcare provider.             Medication List      START taking these medications        Additional Info                      apixaban 2.5 mg Tab   Quantity:  66 tablet   Refills:  0   Dose:  2.5 mg    Last time this was given:  2.5 mg on 3/16/2017  9:02 AM    Instructions:  Take 1 tablet (2.5 mg total) by mouth 2 (two) times daily.     Begin Date    AM    Noon    PM    Bedtime         CHANGE how you take these medications        Additional Info                      amiodarone 400 MG tablet   Commonly known as:  PACERONE   Quantity:  60 tablet   Refills:  11   Dose:  400 mg   What changed:    - how much to take  - when to take this    Last time this was given:  200 mg on 3/16/2017  9:02 AM   Instructions:  Take 1 tablet (400 mg total) by mouth 2 (two) times daily.     Begin Date    AM    Noon    PM    Bedtime       mupirocin 2 % ointment   Commonly known as:  BACTROBAN   Quantity:  22 g   Refills:  0   What changed:    - how to take this  - when to take this  - reasons to take this  - additional instructions    Instructions:  Apply to wound infection 2 times a week for 7 days     Begin Date    AM    Noon    PM    Bedtime         CONTINUE taking these medications        Additional Info                      acetaminophen 500 MG tablet   Commonly known as:  TYLENOL   Refills:  0   Dose:  500 mg    Instructions:  Take 500 mg by mouth every 6 (six) hours as needed for Pain.     Begin Date    AM    Noon    PM    Bedtime       acitretin 10 MG capsule   Commonly known as:  SORIATANE   Refills:  5   Dose:  10 mg    Instructions:  Take 10 mg by mouth every evening.     Begin Date    AM    Noon    PM    Bedtime       aspirin 81 MG EC tablet   Commonly known as:  ECOTRIN   Refills:  0   Dose:  81 mg    Last time this was given:  81 mg on 3/16/2017  9:02 AM   Instructions:  Take 81 mg by mouth once daily.     Begin Date    AM    Noon    PM    Bedtime       atorvastatin 40 MG tablet   Commonly known as:  LIPITOR   Quantity:  30 tablet   Refills:  3    Last time this was given:  40 mg on 3/15/2017  8:36 PM   Instructions:  TAKE 1 TABLET BY MOUTH EVERY EVENING     Begin Date    AM    Noon    PM    Bedtime       carvedilol 25 MG tablet   Commonly known as:  COREG   Quantity:  60 tablet    Refills:  11   Dose:  12.5 mg    Last time this was given:  6.25 mg on 3/16/2017  9:00 AM   Instructions:  Take 0.5 tablets (12.5 mg total) by mouth 2 (two) times daily with meals.     Begin Date    AM    Noon    PM    Bedtime       cloNIDine 0.3 MG tablet   Commonly known as:  CATAPRES   Refills:  0   Dose:  0.3 mg    Instructions:  Take 0.3 mg by mouth as needed.     Begin Date    AM    Noon    PM    Bedtime       clopidogrel 75 mg tablet   Commonly known as:  PLAVIX   Refills:  0   Dose:  75 mg    Last time this was given:  75 mg on 3/16/2017  9:03 AM   Instructions:  Take 75 mg by mouth once daily.     Begin Date    AM    Noon    PM    Bedtime       docusate sodium 100 MG capsule   Commonly known as:  COLACE   Refills:  0   Dose:  100 mg    Instructions:  Take 1 capsule (100 mg total) by mouth 3 (three) times daily as needed for Constipation.     Begin Date    AM    Noon    PM    Bedtime       isosorbide mononitrate 30 MG 24 hr tablet   Commonly known as:  IMDUR   Quantity:  30 tablet   Refills:  11   Dose:  30 mg    Last time this was given:  30 mg on 3/16/2017  9:03 AM   Instructions:  Take 1 tablet (30 mg total) by mouth once daily.     Begin Date    AM    Noon    PM    Bedtime       loratadine 10 mg tablet   Commonly known as:  CLARITIN   Refills:  0   Dose:  10 mg    Instructions:  Take 10 mg by mouth once daily.     Begin Date    AM    Noon    PM    Bedtime       nitroGLYCERIN 0.4 MG SL tablet   Commonly known as:  NITROSTAT   Quantity:  30 tablet   Refills:  0   Dose:  0.4 mg    Instructions:  Place 1 tablet (0.4 mg total) under the tongue every 5 (five) minutes as needed for Chest pain.     Begin Date    AM    Noon    PM    Bedtime       ondansetron 4 MG Tbdl   Commonly known as:  ZOFRAN-ODT   Quantity:  20 tablet   Refills:  0   Dose:  8 mg    Last time this was given:  8 mg on 3/15/2017  4:55 PM   Instructions:  Take 2 tablets (8 mg total) by mouth every 8 (eight) hours as needed.     Begin Date     AM    Noon    PM    Bedtime       pantoprazole 40 MG tablet   Commonly known as:  PROTONIX   Quantity:  30 tablet   Refills:  11   Dose:  40 mg    Last time this was given:  40 mg on 3/16/2017  9:03 AM   Instructions:  Take 1 tablet (40 mg total) by mouth once daily.     Begin Date    AM    Noon    PM    Bedtime       predniSONE 5 MG tablet   Commonly known as:  DELTASONE   Quantity:  100 tablet   Refills:  1    Last time this was given:  5 mg on 3/16/2017  9:03 AM   Instructions:  TAKE 1 TABLET(S) ORAL (BY MOUTH) EVERY DAY     Begin Date    AM    Noon    PM    Bedtime       RANEXA 500 MG Tb12   Quantity:  60 tablet   Refills:  12   Generic drug:  ranolazine    Last time this was given:  500 mg on 3/16/2017  9:03 AM   Instructions:  TAKE 1 TABLET TWICE A DAY     Begin Date    AM    Noon    PM    Bedtime       sertraline 100 MG tablet   Commonly known as:  ZOLOFT   Refills:  0   Dose:  50 mg    Last time this was given:  50 mg on 3/16/2017  9:02 AM   Instructions:  Take 50 mg by mouth once daily.     Begin Date    AM    Noon    PM    Bedtime       sevelamer carbonate 800 mg Tab   Commonly known as:  RENVELA   Quantity:  240 tablet   Refills:  1   Dose:  800 mg    Last time this was given:  800 mg on 3/16/2017 12:33 PM   Instructions:  Take 1 tablet (800 mg total) by mouth 3 (three) times daily with meals.     Begin Date    AM    Noon    PM    Bedtime       thiamine 100 MG tablet   Quantity:  30 tablet   Refills:  11   Dose:  100 mg    Last time this was given:  100 mg on 3/16/2017  9:03 AM   Instructions:  Take 1 tablet (100 mg total) by mouth once daily.     Begin Date    AM    Noon    PM    Bedtime       tramadol 50 mg tablet   Commonly known as:  ULTRAM   Quantity:  20 tablet   Refills:  0   Dose:  50 mg    Instructions:  Take 1 tablet (50 mg total) by mouth every 6 (six) hours as needed for Pain.     Begin Date    AM    Noon    PM    Bedtime       VITAMIN B-6 200 mg Tab   Refills:  0   Dose:  1 tablet   Generic  drug:  pyridoxine (vitamin B6)    Instructions:  Take 1 tablet by mouth every evening.     Begin Date    AM    Noon    PM    Bedtime         STOP taking these medications     methylPREDNISolone 4 mg tablet   Commonly known as:  MEDROL (LIANNE)            Where to Get Your Medications      [id number] [place] [place] [facility] [address] [address]  [address] [facility] [address]     Phone:  [phone]     apixaban 2.5 mg Tab         You can get these medications from any pharmacy     Bring a paper prescription for each of these medications     tramadol 50 mg tablet                  Please bring to all follow up appointments:    1. A copy of your discharge instructions.  2. All medicines you are currently taking in their original bottles.  3. Identification and insurance card.    Please arrive 15 minutes ahead of scheduled appointment time.    Please call 24 hours in advance if you must reschedule your appointment and/or time.        Your Scheduled Appointments     Mar 28, 2017  1:30 PM CDT   Non-Fasting Lab with LABORATORY, [facility]    [address] [address] [phone]            Mar 28, 2017  2:20 PM CDT   Established Patient Visit with Darron Anne MD   [facility] - Cardiology ([facility])    [address] [address] [phone]            Mar 28, 2017  3:00 PM CDT   Established Patient Visit with Denisha Manzano PA-C   [facility] - Rheumatology ([facility])    [address] [id number] [phone]            Jul 13, 2017 11:10 AM CDT   Non-Fasting Lab with LABORATORY, [place] [facility]   [facility]    [address] [address] [facility] [address] [phone]            Jul 18, 2017 11:30 AM CDT   Ct Abd Pel W Contrast with [facility] CT1 LIMIT 500 LBS   [facility]    [address] [address] [facility] [facility] [address]    [phone]              Follow-up Information     Follow up with Darron Betts MD In 3 weeks.    Specialty:  Orthopedic Surgery    Why:  Hosp F/U - Left hip fracture    Contact information:    [address] [place] [address] [address] [place] [address]  [phone]          Discharge Instructions     Future Orders    Activity as tolerated     Diet general     Questions:    Total calories:      Fat restriction, if any:      Protein restriction, if any:      Na restriction, if any:      Fluid restriction:      Additional restrictions:  Renal        Discharge Instructions         Discharge Instructions for Hip Fracture Surgery  You had surgery to repair a hip fracture. The type of surgery you received depends on the location and severity of the fracture. You may have pins, screws, or rods (internal fixation devices) holding the fractured bone in place. Or, some or all of your hip may have been replaced. You must take care of your new hip as you recover at home or in a rehabilitation facility. This means moving and sitting the way you were taught in the hospital. You must also see your doctor for follow-up visits as you slowly return to activity.  Hip repair for fracture or hip replacement is major surgery. So dont be surprised if it takes a few months before you can move comfortably. Plan to have your family and friends help when you return home.  Home care  · Take your pain medicine exactly as directed.  · Dont drive until your doctor says its OK. And never drive if you are taking opioid pain medicine.  · Wear the support stockings you were given in the hospital. Wear them 24 hours a day for 3 to 4 week(s).  · Make arrangements to have your staples removed 2 weeks after surgery. The staples were used to close the skin incision.  · Get up and carefully move around to relieve pain.  · If you received an artificial hip joint, tell all your healthcare providers--including your dentist--about the joint before any procedure.  You may need to take antibiotics before dental work and other medical procedures to reduce the risk of infection.  Incision care  · Avoid infection by washing your hands often. If an infection occurs, it will need to be treated with antibiotics immediately. So call your doctor right away if you think you may have an infection. Symptoms of infection include a fever or leakage of white, greenish, or yellowish-colored fluid from the incision.  · Check your incision daily for redness, tenderness, or drainage.  · Avoid soaking your wound in water (no hot tubs, bathtubs, swimming pools) until your doctor says its OK.  · Wait 7 day(s) after your surgery to begin showering. Then shower as needed. Carefully wash your incision with soap and water. Gently pat it dry. Dont rub the incision, or apply creams or lotions. And sit on a shower stool when you shower to keep from falling.  Sitting and sleeping  · Dont sit for more than 30 to 45 minutes at a time.  · Use chairs with arms, and sit with your knees slightly lower than your hips. Dont sit on low or sagging chairs or couches.  · Dont lean forward while sitting.  · Dont cross your legs.  · Keep your feet flat on the floor. Dont turn your foot or leg inward. This stresses your hip joint.  · Use an elevated toilet seat for 6 week(s) after surgery.  · Use pillows between your legs when sleeping on your back or on your healthy side.  · Sit on a firm cushion when you ride in a car and avoid sitting too low. Try not to bend your hip too much when getting in and out of the car.  Moving safely  · Dont bend at the hip when you bend over. Dont bend at the waist to put on socks and shoes. And avoid picking up items from the floor.  · Use a cane, crutches, a walker, or handrails until your balance, flexibility, and strength improve. And remember to ask for help from others when you need it.  · Free up your hands so that you can use them to keep balance. Use a craig pack, apron,  or pockets to carry things.  · Follow your doctors orders regarding how much weight to place on the affected leg.  · Do all exercises as instructed.  · Arrange your household to keep the items you need within reach.  · Remove electrical cords, throw rugs, and anything else that may cause you to fall.  · Use nonslip bath mats, grab bars, an elevated toilet seat, and a shower chair in your bathroom.  Follow-up  Make a follow-up appointment as directed by your doctor.     When to seek medical attention  Call 911 right away if you have any of the following:  · Chest pain  · Shortness of breath  Otherwise, call your doctor right away if you have any of the following:  · Increased hip pain  · Pain or swelling of your calf or leg  · Fever above 100.4°F  (38.0°C) or shaking chills  · Excessive swelling, increased redness, or any drainage from the incision  · Swelling, tenderness, or cramps in your leg   Date Last Reviewed: 11/15/2015  © 7116-0906 "[employer]". [address] [place] [address]. All rights reserved. This information is not intended as a substitute for professional medical care. Always follow your healthcare professional's instructions.            Primary Diagnosis     Your primary diagnosis was:  Closed Fracture Of Hip      Admission Information     Date & Time Provider Department CSN    3/12/2017  6:04 PM [place] Garvey MD [facility] -  [id number]      Care Providers     Provider Role Specialty Primary office phone    [place] Garvey MD Attending Provider General Practice [phone]    Doc Lerma MD Consulting Physician  Nephrology [phone]    Manuel Pittman MD Consulting Physician  Orthopedic Surgery [phone]    [place] Garvey MD Team Attending  General Practice [phone]    Manuel Pittman MD Surgeon  Orthopedic Surgery [phone]      Your Vitals Were     BP Pulse Temp Resp Height Weight    145/79 (BP Location: Right arm, Patient Position: Lying, BP Method:  "Automatic) 77 98.1 °F (36.7 °C) (Oral) 18 5' 10" (1.778 m) 77.8 kg (171 lb 8.3 oz)    SpO2 BMI             96% 24.61 kg/m2         Recent Lab Values     No lab values to display.      Allergies as of 3/16/2017        Reactions    Codeine Other (See Comments)    Hallucination    Hydrocodone Other (See Comments)    Impairs vision; AMS    Percocet [Oxycodone-acetaminophen] Other (See Comments)    Makes him go crazy    Doxycycline Rash, Other (See Comments)    Vision impairment  Other reaction(s): Unknown    Levaquin [Levofloxacin] Rash    Blisters    Xanax [Alprazolam] Palpitations    pychosis       [facility]     [facility] Nurse Care Line - 24/7 Assistance  Unless otherwise directed by your provider, please contact [facility], our nurse care line that is available for 24/7 assistance.     Registered nurses in the [facility] provide clinical advisement, health education, appointment booking, and other advisory services.  Call for this free service at [phone].        Advance Directives     An advance directive is a document which, in the event you are no longer able to make decisions for yourself, tells your healthcare team what kind of treatment you do or do not want to receive, or who you would like to make those decisions for you.  If you do not currently have an advance directive, [facility] encourages you to create one.  For more information call:  [phone]), [phone] ([phone]),  or log on to [email or web address].        Language Assistance Services     ATTENTION: Language assistance services are available, free of charge. Please call [phone].      ATENCIÓN: Si habla español, tiene a koehler disposición servicios gratuitos de asistencia lingüística. Llame al [phone].     CHÚ Ý: N?u b?n nói Ti?ng Vi?t, có các d?ch v? h? tr? ngôn ng? mi?n phí dành cho b?n. G?i s? [phone].        Heart Failure Education       Heart Failure: Being " Active  You have a condition called heart failure. Being active doesnt mean that you have to wear yourself out. Even a little movement each day helps to strengthen your heart. If you cant get out to exercise, you can do simple stretching and strengthening exercises at home. These are good ways to keep you well-conditioned and prevent you and your heart from becoming excessively weak.    Ideas to get you started  · Add a little movement to things you do now. Walk to mail letters. Park your car at the far end of the parking lot and walk to the store. Walk up a flight of stairs instead of taking the elevator.  · Choose activities you enjoy. You might walk, swim, or ride an exercise bike. Things like gardening and washing the car count, too. Other possibilities include: washing dishes, walking the dog, walking around the mall, and doing aerobic activities with friends.  · Join a group exercise program at a [facility] or [facility], a senior center, or a community center. Or look into a hospital cardiac rehabilitation program. Ask your doctor if you qualify.  Tips to keep you going  · Get up and get dressed each day. Go to a coffee shop and read a newspaper or go somewhere that you'll be in the presence of other active people. Youll feel more like being active.  · Make a plan. Choose one or more activities that you enjoy and that you can easily do. Then plan to do at least one each day. You might write your plan on a calendar.  · Go with a friend or a group if you like company. This can help you feel supported and stay motivated, too.  · Plan social events that you enjoy. This will keep you mentally engaged as well as physically motivated to do things you find pleasure in.  For your safety  · Talk with your healthcare provider before starting an exercise program.  · Exercise indoors when its too hot or too cold outside, or when the air quality is poor. Try walking at a shopping mall.  · Wear socks and sturdy shoes to maintain  your balance and prevent falls.  · Start slowly. Do a few minutes several times a day at first. Increase your time and speed little by little.  · Stop and rest whenever you feel tired or get short of breath.  · Dont push yourself on days when you dont feel well.  Date Last Reviewed: 3/20/2016  © 4603-7953 [employer]. [address] [place] [address], [place] [address]. All rights reserved. This information is not intended as a substitute for professional medical care. Always follow your healthcare professional's instructions.              Heart Failure: Evaluating Your Heart  You have a condition called heart failure. To evaluate your condition, your doctor will examine you, ask questions, and do some tests. Along with looking for signs of heart failure, the doctor looks for any other health problems that may have led to heart failure. The results of your evaluation will help your doctor form a treatment plan.  Health history and physical exam  Your visit will start with a health history. Tell the doctor about any symptoms youve noticed and about all medicines you take. Then youll have a physical exam. This includes listening to your heartbeat and breathing. Youll also be checked for swelling (edema) in your legs and neck. When you have fluid buildup or fluid in the lungs, it may be called congestive heart failure.  Diagnosing heart failure     During an echocardiogram, sound waves bounce off the heart. These are converted into a picture on the screen.   The following may be done to help your doctor form a diagnosis:  · X-rays show the size and shape of your heart. These pictures can also show fluid in your lungs.  · An electrocardiogram (ECG or EKG) shows the pattern of your heartbeat. Small pads (electrodes) are placed on your chest, arms, and legs. Wires connect the pads to the ECG machine, which records your hearts electrical signals. This can give the doctor information about heart function.  · An  echocardiogram uses ultrasound waves to show the structure and movement of your heart muscle. This shows how well the heart pumps. It also shows the thickness of the heart walls, and if the heart is enlarged. It is one of the most useful, non-invasive tests as it provides information about the heart's general function. This helps your doctor make treatment decisions.  · Lab tests evaluate small amounts of blood or urine for signs of problems. A BNP lab test can help diagnose and evaluate heart failure. BNP stands for B-type natriuretic peptide. The ventricles secrete more BNP when heart failure worsens. Lab tests can also provide information about metabolic dysfunction or heart dysfunction.  Your treatment plan  Based on the results of your evaluation and tests, your doctor will develop a treatment plan. This plan is designed to relieve some of your heart failure symptoms and help make you more comfortable. Your treatment plan may include:  · Medicine to help your heart work better and improve your quality of life  · Changes in what you eat and drink to help prevent fluid from backing up in your body  · Daily monitoring of your weight and heart failure symptoms to see how well your treatment plan is working  · Exercise to help you stay healthy  · Help with quitting smoking  · Emotional and psychological support to help adjust to the changes  · Referrals to other specialists to make sure you are being treated comprehensively  Date Last Reviewed: 3/21/2016  © 1685-3936 [employer]. [address] [place] [address], [place] [address]. All rights reserved. This information is not intended as a substitute for professional medical care. Always follow your healthcare professional's instructions.              Heart Failure: Making Changes to Your Diet  You have a condition called heart failure. When you have heart failure, excess fluid is more likely to build up in your body because your heart isn't working well. This  makes the heart work harder to pump blood. Fluid buildup causes symptoms such as shortness of breath and swelling (edema). This is often referred to as congestive heart failure or CHF. Controlling the amount of salt (sodium) you eat may help stop fluid from building up. Your doctor may also tell you to reduce the amount of fluid you drink.  Reading food labels    Your healthcare provider will tell you how much sodium you can eat each day. Read food labels to keep track. Keep in mind that certain foods are high in salt. These include canned, frozen, and processed foods. Check the amount of sodium in each serving. Watch out for high-sodium ingredients. These include MSG (monosodium glutamate), baking soda, and sodium phosphate.   Eating less salt  Give yourself time to get used to eating less salt. It may take a little while. Here are some tips to help:  · Take the saltshaker off the table. Replace it with salt-free herb mixes and spices.  · Eat fresh or plain frozen vegetables. These have much less salt than canned vegetables.  · Choose low-sodium snacks like sodium-free pretzels, crackers, or air-popped popcorn.  · Dont add salt to your food when youre cooking. Instead, season your foods with pepper, lemon, garlic, or onion.  · When you eat out, ask that your food be cooked without added salt.  · Avoid eating fried foods as these often have a great deal of salt.  If youre told to limit fluids  You may need to limit how much fluid you have to help prevent swelling. This includes anything that is liquid at room temperature, such as ice cream and soup. If your doctor tells you to limit fluid, try these tips:  · Measure drinks in a measuring cup before you drink them. This will help you meet daily goals.  · Chill drinks to make them more refreshing.  · Suck on frozen lemon wedges to quench thirst.  · Only drink when youre thirsty.  · Chew sugarless gum or suck on hard candy to keep your mouth moist.  · Weigh  yourself daily to know if your body's fluid content is rising.  My sodium goal  Your healthcare provider may give you a sodium goal to meet each day. This includes sodium found in food as well as salt that you add. My goal is to eat no more than ___________ mg of sodium per day.     When to call your doctor  Call your doctor right away if you have any symptoms of worsening heart failure. These can include:  · Sudden weight gain  · Increased swelling of your legs or ankles  · Trouble breathing when youre resting or at night  · Increase in the number of pillows you have to sleep on  · Chest pain, pressure, discomfort, or pain in the jaw, neck, or back   Date Last Reviewed: 3/21/2016  © 2742-1177 [employer]. [address] [place] [address], [place] [address]. All rights reserved. This information is not intended as a substitute for professional medical care. Always follow your healthcare professional's instructions.              Heart Failure: Medicines to Help Your Heart    You have a condition called heart failure (also known as congestive heart failure, or CHF). Your doctor will likely prescribe medicines for heart failure and any underlying health problems you have. Most heart failure patients take one or more types of medicinen. Your healthcare provider will work to find the combination of medicines that works best for you.  Heart failure medicines  Here are the most common heart failure medicines:  · ACE inhibitors lower blood pressure and decrease strain on the heart. This makes it easier for the heart to pump. Angiotensin receptor blockers have similar effects. These are prescribed for some patients instead of ACE inhibitors.  · Beta-blockers relieve stress on the heart. They also improve symptoms. They may also improve the heart's pumping action over time.  · Diuretics (also called water pills) help rid your body of excess water. This can help rid your body of swelling (edema). Having less fluid to  pump means your heart doesnt have to work as hard. Some diuretics make your body lose a mineral called potassium. Your doctor will tell you if you need to take supplements or eat more foods high in potassium.  · Digoxin helps your heart pump with more strength. This helps your heart pump more blood with each beat. So, more oxygen-rich blood travels to the rest of the body.  · Aldosterone antagonists help alter hormones and decrease strain on the heart.  · Hydralazine and nitrates are two separate medicines used together to treat heart failure. They may come in one combination pill. They lower blood pressure and decrease how hard the heart has to pump.  Medicines for related conditions  Controlling other heart problems helps keep heart failure under control, too. Depending on other heart problems you have, medicines may be prescribed to:  · Lower blood pressure (antihypertensives).  · Lower cholesterol levels (statins).  · Prevent blood clots (anticoagulants or aspirin).  · Keep the heartbeat steady (antiarrhythmics).  Date Last Reviewed: 3/5/2016  © 3786-9360 [employer]. [address] [place] [address]. All rights reserved. This information is not intended as a substitute for professional medical care. Always follow your healthcare professional's instructions.              Heart Failure: Procedures That May Help    The heart is a muscle that pumps oxygen-rich blood to all parts of the body. When you have heart failure, the heart is not able to pump as well as it should. Blood and fluid may back up into the lungs (congestive heart failure), and some parts of the body dont get enough oxygen-rich blood to work normally. These problems lead to the symptoms of heart failure.     Certain procedures may help the heart pump better in some cases of heart failure. Some procedures are done to treat health problems that may have caused the heart failure such as coronary artery disease or heart  rhythm problems. For more serious heart failure, other options are available.  Treating artery and valve problems  If you have coronary artery disease or valve disease, procedures may be done to improve blood flow. This helps the heart pump better, which can improve heart failure symptoms. First, your doctor may do a cardiac catheterization to help detect clogged blood vessels or valve damage. During this procedure, a  thin tube (catheter) in inserted into a blood vessel and guided to the heart. There a dye is injected and a special type of X-ray (angiogram) is taken of the blood vessels. Procedures to open a blocked artery or fix damaged valves can also be done using catheterization.  · Angioplasty uses a balloon-tipped instrument at the end of the catheter. The balloon is inflated to widen the narrowed artery. In many cases, a stent is expanded to further support the narrowed artery. A stent is a metal mesh tube.  · Valve surgery repairs or replacement of faulty valves can also be done during catheterization so blood can flow properly through the chambers of the heart.  Bypass surgery is another option to help treat blocked arteries. It uses a healthy blood vessel from elsewhere in the body. The healthy blood vessel is attached above and below the blocked area so that blood can flow around the blocked artery.  Treating heart rhythm problems  A device may be placed in the chest to help a weak heart maintain a healthy, heartbeat so the heart can pump more effectively:  · Pacemaker. A pacemaker is an implanted device that regulates your heartbeat electronically. It monitors your heart's rhythm and generates a painless electric impulse that helps the heart beat in a regular rhythm. A pacemaker is programmed to meet your specific heart rhythm needs.  · Biventricular pacing/cardiac resynchronization therapy. A type of pacemaker that paces both pumping chambers of the heart at the same time to coordinate contractions and  to improve the heart's function. Some people with heart failure are candidates for this therapy.  · Implantable cardioverter defibrillator. A device similar to a pacemaker that senses when the heart is beating too fast and delivers an electrical shock to convert the fast rhythm to a normal rhythm. This can be a life saving device.  In severe cases  In more serious cases of heart failure when other treatments no longer work, other options may include:  · Ventricular assist devices (VADs). These are mechanical devices used to take over the pumping function for one or both of the heart's ventricles, or pumping chambers. A VAD may be necessary when heart failure progresses to the point that medicines and other treatments no longer help. In some cases, a VAD may be used as a bridge to transplant.  · Heart transplant. This is replacing the diseased heart with a healthy one from a donor. This is an option for a few people who are very sick. A heart transplant is very serious and not an option for all patients. Your doctor can tell you more.  Date Last Reviewed: 3/20/2016  © 9294-4408 [employer]. [address] [place] [address]. All rights reserved. This information is not intended as a substitute for professional medical care. Always follow your healthcare professional's instructions.              Heart Failure: Tracking Your Weight  You have a condition called heart failure. When you have heart failure, a sudden weight gain or a steady rise in weight is a warning sign that your body is retaining too much water and salt. This could mean your heart failure is getting worse. If left untreated, it can cause problems for your lungs and result in shortness of breath. Weighing yourself each day is the best way to know if youre retaining water. If your weight goes up quickly, call your doctor. You will be given instructions on how to get rid of the excess water. You will likely need medicines and to  avoid salt. This will help your heart work better.  Call your doctor if you gain more than 2 pounds in 1 day, more than 5 pounds in 1 week, or whatever weight gain you were told to report by your doctor. This is often a sign of worsening heart failure and needs to be evaluated and treated. Your doctor will tell you what to do next.   Tips for weighing yourself    · Weigh yourself at the same time each morning, wearing the same clothes. Weigh yourself after urinating and before eating.  · Use the same scale each day. Make sure the numbers are easy to read. Put the scale on a flat, hard surface -- not on a rug or carpet.  · Do not stop weighing yourself. If you forget one day, weigh again the next morning.  How to use your weight chart  · Keep your weight chart near the scale. Write your weight on the chart as soon as you get off the scale.  · Fill in the month and the start date on the chart. Then write down your weight each day. Your chart will look like this:    · If you miss a day, leave the space blank. Weigh yourself the next day and write your weight in the next space.  · Take your weight chart with you when you go to see your doctor.  Date Last Reviewed: 3/20/2016  © 6166-0916 [employer]. [address] [place] [address], [place] [address]. All rights reserved. This information is not intended as a substitute for professional medical care. Always follow your healthcare professional's instructions.              Heart Failure: Warning Signs of a Flare-Up  You have a condition called heart failure. Once you have heart failure, flare-ups can happen. Below are signs that can mean your heart failure is getting worse. If you notice any of these warning signs, call your healthcare provider.  Swelling    · Your feet, ankles, or lower legs get puffier.  · You notice skin changes on your lower legs.  · Your shoes feel too tight.  · Your clothes are tighter in the waist.  · You have trouble getting rings on or off  your fingers.  Shortness of breath  · You have to breathe harder even when youre doing your normal activities or when youre resting.  · You are short of breath walking up stairs or even short distances.  · You wake up at night short of breath or coughing.  · You need to use more pillows or sit up to sleep.  · You wake up tired or restless.  Other warning signs  · You feel weaker, dizzy, or more tired.  · You have chest pain or changes in your heartbeat.  · You have a cough that wont go away.  · You cant remember things or dont feel like eating.  Tracking your weight  Gaining weight is often the first warning sign that heart failure is getting worse. Gaining even a few pounds can be a sign that your body is retaining excess water and salt. Weighing yourself each day in the morning after you urinate and before you eat, is the best way to know if you're retaining water. Get a scale that is easy to read and make sure you wear the same clothes and use the same scale every time you weigh. Your healthcare provider will show you how to track your weight. Call your doctor if you gain more than 2 pounds in 1 day, 5 pounds in 1 week, or whatever weight gain you were told to report by your doctor. This is often a sign of worsening heart failure and needs to be evaluated and treated before it compromises your breathing. Your doctor will tell you what to do next.    Date Last Reviewed: 3/15/2016  © 5535-9305 [employer]. [address] [place] [address], [place] [address]. All rights reserved. This information is not intended as a substitute for professional medical care. Always follow your healthcare professional's instructions.              Chronic [place] Disease Education             [employer] [facility] [facility] complies with applicable Federal civil rights laws and does not discriminate on the basis of race, color, national origin, age, disability, or sex.

## 2017-03-13 ENCOUNTER — ANESTHESIA (OUTPATIENT)
Dept: SURGERY | Facility: HOSPITAL | Age: 72
DRG: 480 | End: 2017-03-13
Payer: COMMERCIAL

## 2017-03-13 ENCOUNTER — ANESTHESIA EVENT (OUTPATIENT)
Dept: SURGERY | Facility: HOSPITAL | Age: 72
DRG: 480 | End: 2017-03-13
Payer: COMMERCIAL

## 2017-03-13 LAB
ABO + RH BLD: NORMAL
ALBUMIN SERPL BCP-MCNC: 3.2 G/DL
ALP SERPL-CCNC: 106 U/L
ALT SERPL W/O P-5'-P-CCNC: 15 U/L
ANION GAP SERPL CALC-SCNC: 17 MMOL/L
AST SERPL-CCNC: 20 U/L
BASOPHILS # BLD AUTO: 0.02 K/UL
BASOPHILS NFR BLD: 0.2 %
BILIRUB SERPL-MCNC: 0.6 MG/DL
BLD GP AB SCN CELLS X3 SERPL QL: NORMAL
BUN SERPL-MCNC: 65 MG/DL
CALCIUM SERPL-MCNC: 9.7 MG/DL
CHLORIDE SERPL-SCNC: 98 MMOL/L
CO2 SERPL-SCNC: 25 MMOL/L
CREAT SERPL-MCNC: 8.2 MG/DL
DIFFERENTIAL METHOD: ABNORMAL
EOSINOPHIL # BLD AUTO: 0.3 K/UL
EOSINOPHIL NFR BLD: 2.8 %
ERYTHROCYTE [DISTWIDTH] IN BLOOD BY AUTOMATED COUNT: 18.7 %
EST. GFR  (AFRICAN AMERICAN): 7 ML/MIN/1.73 M^2
EST. GFR  (NON AFRICAN AMERICAN): 6 ML/MIN/1.73 M^2
GLUCOSE SERPL-MCNC: 99 MG/DL
HCT VFR BLD AUTO: 41.3 %
HGB BLD-MCNC: 13.5 G/DL
LYMPHOCYTES # BLD AUTO: 0.9 K/UL
LYMPHOCYTES NFR BLD: 10.5 %
MCH RBC QN AUTO: 32.9 PG
MCHC RBC AUTO-ENTMCNC: 32.7 %
MCV RBC AUTO: 101 FL
MONOCYTES # BLD AUTO: 1.6 K/UL
MONOCYTES NFR BLD: 17.7 %
NEUTROPHILS # BLD AUTO: 6.2 K/UL
NEUTROPHILS NFR BLD: 68.8 %
PLATELET # BLD AUTO: 193 K/UL
PMV BLD AUTO: 10.5 FL
POTASSIUM SERPL-SCNC: 4.6 MMOL/L
POTASSIUM SERPL-SCNC: 5.5 MMOL/L
PROT SERPL-MCNC: 6.5 G/DL
RBC # BLD AUTO: 4.1 M/UL
SODIUM SERPL-SCNC: 140 MMOL/L
WBC # BLD AUTO: 8.94 K/UL

## 2017-03-13 PROCEDURE — 36000711: Performed by: ORTHOPAEDIC SURGERY

## 2017-03-13 PROCEDURE — 36000710: Performed by: ORTHOPAEDIC SURGERY

## 2017-03-13 PROCEDURE — 25000003 PHARM REV CODE 250: Performed by: NURSE ANESTHETIST, CERTIFIED REGISTERED

## 2017-03-13 PROCEDURE — 63600175 PHARM REV CODE 636 W HCPCS: Performed by: INTERNAL MEDICINE

## 2017-03-13 PROCEDURE — 80100016 HC MAINTENANCE HEMODIALYSIS

## 2017-03-13 PROCEDURE — 99222 1ST HOSP IP/OBS MODERATE 55: CPT | Mod: 25,,, | Performed by: INTERNAL MEDICINE

## 2017-03-13 PROCEDURE — 0QS706Z REPOSITION LEFT UPPER FEMUR WITH INTRAMEDULLARY INTERNAL FIXATION DEVICE, OPEN APPROACH: ICD-10-PCS | Performed by: ORTHOPAEDIC SURGERY

## 2017-03-13 PROCEDURE — 63600175 PHARM REV CODE 636 W HCPCS: Performed by: NURSE ANESTHETIST, CERTIFIED REGISTERED

## 2017-03-13 PROCEDURE — 86850 RBC ANTIBODY SCREEN: CPT

## 2017-03-13 PROCEDURE — 85025 COMPLETE CBC W/AUTO DIFF WBC: CPT

## 2017-03-13 PROCEDURE — 37000009 HC ANESTHESIA EA ADD 15 MINS: Performed by: ORTHOPAEDIC SURGERY

## 2017-03-13 PROCEDURE — C1713 ANCHOR/SCREW BN/BN,TIS/BN: HCPCS | Performed by: ORTHOPAEDIC SURGERY

## 2017-03-13 PROCEDURE — 36415 COLL VENOUS BLD VENIPUNCTURE: CPT

## 2017-03-13 PROCEDURE — C1769 GUIDE WIRE: HCPCS | Performed by: ORTHOPAEDIC SURGERY

## 2017-03-13 PROCEDURE — 27245 TREAT THIGH FRACTURE: CPT | Mod: LT,,, | Performed by: ORTHOPAEDIC SURGERY

## 2017-03-13 PROCEDURE — 11000001 HC ACUTE MED/SURG PRIVATE ROOM

## 2017-03-13 PROCEDURE — 84132 ASSAY OF SERUM POTASSIUM: CPT

## 2017-03-13 PROCEDURE — 37000008 HC ANESTHESIA 1ST 15 MINUTES: Performed by: ORTHOPAEDIC SURGERY

## 2017-03-13 PROCEDURE — 99900037 HC PT THERAPY SCREENING (STAT)

## 2017-03-13 PROCEDURE — 25000003 PHARM REV CODE 250: Performed by: INTERNAL MEDICINE

## 2017-03-13 PROCEDURE — 27201423 OPTIME MED/SURG SUP & DEVICES STERILE SUPPLY: Performed by: ORTHOPAEDIC SURGERY

## 2017-03-13 PROCEDURE — 99223 1ST HOSP IP/OBS HIGH 75: CPT | Mod: ,,, | Performed by: INTERNAL MEDICINE

## 2017-03-13 PROCEDURE — 71000033 HC RECOVERY, INTIAL HOUR: Performed by: ORTHOPAEDIC SURGERY

## 2017-03-13 PROCEDURE — 90937 HEMODIALYSIS REPEATED EVAL: CPT | Mod: ,,, | Performed by: INTERNAL MEDICINE

## 2017-03-13 PROCEDURE — 99900035 HC TECH TIME PER 15 MIN (STAT)

## 2017-03-13 PROCEDURE — 86900 BLOOD TYPING SEROLOGIC ABO: CPT

## 2017-03-13 PROCEDURE — 80053 COMPREHEN METABOLIC PANEL: CPT

## 2017-03-13 DEVICE — IMPLANTABLE DEVICE: Type: IMPLANTABLE DEVICE | Site: HIP | Status: FUNCTIONAL

## 2017-03-13 RX ORDER — MEPERIDINE HYDROCHLORIDE 50 MG/ML
12.5 INJECTION INTRAMUSCULAR; INTRAVENOUS; SUBCUTANEOUS ONCE AS NEEDED
Status: DISCONTINUED | OUTPATIENT
Start: 2017-03-13 | End: 2017-03-13 | Stop reason: HOSPADM

## 2017-03-13 RX ORDER — MORPHINE SULFATE 10 MG/ML
2 INJECTION INTRAMUSCULAR; INTRAVENOUS; SUBCUTANEOUS EVERY 5 MIN PRN
Status: DISCONTINUED | OUTPATIENT
Start: 2017-03-13 | End: 2017-03-13 | Stop reason: HOSPADM

## 2017-03-13 RX ORDER — ETOMIDATE 2 MG/ML
INJECTION INTRAVENOUS
Status: DISCONTINUED | OUTPATIENT
Start: 2017-03-13 | End: 2017-03-13

## 2017-03-13 RX ORDER — CEFAZOLIN SODIUM 2 G/50ML
2 SOLUTION INTRAVENOUS
Status: DISCONTINUED | OUTPATIENT
Start: 2017-03-13 | End: 2017-03-13 | Stop reason: HOSPADM

## 2017-03-13 RX ORDER — CISATRACURIUM BESYLATE 2 MG/ML
INJECTION, SOLUTION INTRAVENOUS
Status: DISCONTINUED | OUTPATIENT
Start: 2017-03-13 | End: 2017-03-13

## 2017-03-13 RX ORDER — SUCCINYLCHOLINE CHLORIDE 20 MG/ML
INJECTION INTRAMUSCULAR; INTRAVENOUS
Status: DISCONTINUED | OUTPATIENT
Start: 2017-03-13 | End: 2017-03-13

## 2017-03-13 RX ORDER — SODIUM CHLORIDE 9 MG/ML
3 INJECTION, SOLUTION INTRAMUSCULAR; INTRAVENOUS; SUBCUTANEOUS
Status: DISCONTINUED | OUTPATIENT
Start: 2017-03-13 | End: 2017-03-16 | Stop reason: HOSPADM

## 2017-03-13 RX ORDER — ONDANSETRON 2 MG/ML
INJECTION INTRAMUSCULAR; INTRAVENOUS
Status: DISCONTINUED | OUTPATIENT
Start: 2017-03-13 | End: 2017-03-13

## 2017-03-13 RX ORDER — FENTANYL CITRATE 50 UG/ML
INJECTION, SOLUTION INTRAMUSCULAR; INTRAVENOUS
Status: DISCONTINUED | OUTPATIENT
Start: 2017-03-13 | End: 2017-03-13

## 2017-03-13 RX ORDER — OXYCODONE HYDROCHLORIDE 5 MG/1
5 TABLET ORAL
Status: DISCONTINUED | OUTPATIENT
Start: 2017-03-13 | End: 2017-03-13 | Stop reason: HOSPADM

## 2017-03-13 RX ORDER — NEOSTIGMINE METHYLSULFATE 1 MG/ML
INJECTION, SOLUTION INTRAVENOUS
Status: DISCONTINUED | OUTPATIENT
Start: 2017-03-13 | End: 2017-03-13

## 2017-03-13 RX ORDER — SODIUM CHLORIDE 9 MG/ML
INJECTION, SOLUTION INTRAVENOUS CONTINUOUS PRN
Status: DISCONTINUED | OUTPATIENT
Start: 2017-03-13 | End: 2017-03-13

## 2017-03-13 RX ORDER — GLYCOPYRROLATE 0.2 MG/ML
INJECTION INTRAMUSCULAR; INTRAVENOUS
Status: DISCONTINUED | OUTPATIENT
Start: 2017-03-13 | End: 2017-03-13

## 2017-03-13 RX ORDER — LIDOCAINE HYDROCHLORIDE 10 MG/ML
INJECTION INFILTRATION; PERINEURAL
Status: DISCONTINUED | OUTPATIENT
Start: 2017-03-13 | End: 2017-03-13

## 2017-03-13 RX ADMIN — AMIODARONE HYDROCHLORIDE 200 MG: 200 TABLET ORAL at 09:03

## 2017-03-13 RX ADMIN — FENTANYL CITRATE 25 MCG: 50 INJECTION, SOLUTION INTRAMUSCULAR; INTRAVENOUS at 05:03

## 2017-03-13 RX ADMIN — NEOSTIGMINE METHYLSULFATE 3 MG: 1 INJECTION INTRAVENOUS at 05:03

## 2017-03-13 RX ADMIN — CEFAZOLIN 2 G: 1 INJECTION, POWDER, FOR SOLUTION INTRAMUSCULAR; INTRAVENOUS at 04:03

## 2017-03-13 RX ADMIN — PREDNISONE 5 MG: 5 TABLET ORAL at 09:03

## 2017-03-13 RX ADMIN — CISATRACURIUM BESYLATE 2 MG: 2 INJECTION INTRAVENOUS at 04:03

## 2017-03-13 RX ADMIN — SERTRALINE HYDROCHLORIDE 50 MG: 50 TABLET, FILM COATED ORAL at 09:03

## 2017-03-13 RX ADMIN — LIDOCAINE HYDROCHLORIDE 80 MG: 10 INJECTION, SOLUTION INFILTRATION; PERINEURAL at 04:03

## 2017-03-13 RX ADMIN — CISATRACURIUM BESYLATE 8 MG: 2 INJECTION INTRAVENOUS at 04:03

## 2017-03-13 RX ADMIN — ATORVASTATIN CALCIUM 40 MG: 40 TABLET, FILM COATED ORAL at 09:03

## 2017-03-13 RX ADMIN — ETOMIDATE 20 MG: 2 INJECTION, SOLUTION INTRAVENOUS at 04:03

## 2017-03-13 RX ADMIN — CARVEDILOL 12.5 MG: 12.5 TABLET, FILM COATED ORAL at 07:03

## 2017-03-13 RX ADMIN — GLYCOPYRROLATE 0.4 MG: 0.2 INJECTION, SOLUTION INTRAMUSCULAR; INTRAVENOUS at 05:03

## 2017-03-13 RX ADMIN — RANOLAZINE 500 MG: 500 TABLET, FILM COATED, EXTENDED RELEASE ORAL at 09:03

## 2017-03-13 RX ADMIN — ONDANSETRON 4 MG: 2 INJECTION, SOLUTION INTRAMUSCULAR; INTRAVENOUS at 05:03

## 2017-03-13 RX ADMIN — Medication 100 MG: at 09:03

## 2017-03-13 RX ADMIN — SODIUM CHLORIDE: 0.9 INJECTION, SOLUTION INTRAVENOUS at 04:03

## 2017-03-13 RX ADMIN — HYDROMORPHONE HYDROCHLORIDE 1 MG: 2 INJECTION, SOLUTION INTRAMUSCULAR; INTRAVENOUS; SUBCUTANEOUS at 03:03

## 2017-03-13 RX ADMIN — SODIUM CHLORIDE, PRESERVATIVE FREE 3 ML: 5 INJECTION INTRAVENOUS at 07:03

## 2017-03-13 RX ADMIN — SUCCINYLCHOLINE CHLORIDE 120 MG: 20 INJECTION, SOLUTION INTRAMUSCULAR; INTRAVENOUS at 04:03

## 2017-03-13 RX ADMIN — HYDROMORPHONE HYDROCHLORIDE 1 MG: 2 INJECTION, SOLUTION INTRAMUSCULAR; INTRAVENOUS; SUBCUTANEOUS at 07:03

## 2017-03-13 RX ADMIN — SODIUM CHLORIDE, PRESERVATIVE FREE 3 ML: 5 INJECTION INTRAVENOUS at 10:03

## 2017-03-13 RX ADMIN — ISOSORBIDE MONONITRATE 30 MG: 30 TABLET, EXTENDED RELEASE ORAL at 09:03

## 2017-03-13 RX ADMIN — FENTANYL CITRATE 25 MCG: 50 INJECTION, SOLUTION INTRAMUSCULAR; INTRAVENOUS at 06:03

## 2017-03-13 RX ADMIN — PANTOPRAZOLE SODIUM 40 MG: 40 TABLET, DELAYED RELEASE ORAL at 09:03

## 2017-03-13 NOTE — OP NOTE
Date of Procedure: 3/13/2017       Procedure: Procedure(s) (LRB):  IM NAILING OF FEMUR (left)       Surgeon(s) and Role:  * Manuel Pittman MD - Primary      Assisting Surgeon: None      Indications: Status post fall with left with intertrochanteric fracture hip fracture       Pre-Operative Diagnosis: Displaced left intertrochanteric hip fracture      Post-Operative Diagnosis: Displaced left intertrochanteric hip fracture      Anesthesia: General      Technical Procedures Used: Open reduction internal fixation. Cephalomedullary implant Espino&Nephew 42 X 11.5 mm, Proximal screws 105 mm lag screw, 100 mm compression screw, X 12mm, distal screw 55 mm X 5.0mm      Description of the Findings of the Procedure:       The patient was seen in the Holding Room. The risks, benefits, complications, treatment options, and expected outcomes were discussed with the patient. The risks and potential complications of their problem and purposed treatment include but are not limited to infection, nerve injury, vascular injury, nonunion, persistent pain, potential skin necrosis, deep vein thrombosis, possible pulmonary embolus, complications of the anesthetics and failure of the implant. The patient concurred with the proposed plan, giving informed consent. The site of surgery properly noted/marked. The patient was taken to Operating Room # 6y and the procedure verified as Open Reduction internal fixation left hip using [employer]hew Cephalomedullary nail. A Time Out was held and the above information confirmed.      The patient was brought to the operating room, placed on the operating table in a supine position. Following the successful induction of anesthesia the patient was placed in traction on the [place] fracture table and reduced under image intensifier. All bony prominences were well padded The right hip was scrubbed, prepped and draped in the usual sterile fashion. IV antibiotics administered.      Through a proximal lateral  "skin incision, the deep fascia was identified and incised in the line of the incision. The IT band was retracted anteriorly and incised in the line of the incision. Careful hemostasis was achieved with Bovie cautery. The guide pin was placed at the tip of the greater trochanter and advanced to the level of the lesser trochanter. The entry reamer was than used. The guide wire was introduced and advanced to the distal femur. Appropriate nail length measured. Sequential reaming commenced starting at size 8 mm and ending at 13 mm. The nail was inserted. A second lateral incision was made for the lag screw and compression screw.The external jig was used to drill a pathway for the lag screw guide pin. It was placed in a "center-center" position, measured, then tapped further to a femoral subchondral position and and drill path reamed.Lag screw placed. Fluoroscopy verified adequate screw length, center-center position, and reduced alignment. Compression screw placed and the fracture was then compressed.   Using a standard free hand technique one distal screw was placed. Fluoroscopy was used to verify screw length and the crew was within the nail on both the AP and lateral views.       .  Closure was performed with a #1 Vicryl for the deep fascia, a 2-0 Vicryl for the subcutaneous tissue and followed by staple skin closure.    Instrument, sponge, and needle counts were correct prior to closure and at the conclusion of the case.       Significant Surgical Tasks Conducted by the Assistant(s), if Applicable: NA      Complications: None; patient tolerated the procedure well.       Total IV Fluids: See anesthesia       Estimated Blood Loss (EBL): less than 100 mL      Drains: NA      Implants: [employer]hew   Specimens: NONE      Condition: stable      Disposition: PACU - hemodynamically stable.      Attestation: I was present and scrubbed for the entire   "

## 2017-03-13 NOTE — ED PROVIDER NOTES
SCRIBE #1 NOTE: I, Jordantalya Addison, am scribing for, and in the presence of, Cuauhtemoc Reilly MD. I have scribed the entire note.      History      Chief Complaint   Patient presents with    Fall     Patient tripped and couldnt regain his balance and landed on his left hip, patient c/o left hip pain and left elbow pain, patient denies LOC       Review of patient's allergies indicates:   Allergen Reactions    Codeine Other (See Comments)     Hallucination    Hydrocodone Other (See Comments)     Impairs vision; AMS    Percocet [oxycodone-acetaminophen] Other (See Comments)     Makes him go crazy      Doxycycline Rash and Other (See Comments)     Vision impairment  Other reaction(s): Unknown    Levaquin [levofloxacin] Rash     Blisters    Xanax [alprazolam] Palpitations     pychosis         HPI   HPI    3/12/2017, 7:03 PM   History obtained from the patient      History of Present Illness: Quinn Hutchins is a 71 y.o. male patient who presents to the Emergency Department for and evaluation of left hip pain following fall which onset suddenly today. Pt states he was pushing a  when he let go, lost his balance and fell. Symptoms are constant and moderate in severity. Exacerbated by movement and relieved by nothing. Associated sxs include gait problem and laceration to left elbow. Patient denies any head injury, head trauma, LOC,  HA, dizziness, weakness, lightheadedness, numbness, back pain, neck pain, knee pain, abd pain, nausea, vomiting, syncope, CP, SOB, rash, fatigue, diaphoresis, cough and all other sxs at this time. Pt reports dialysis MWF. No further complaints or concerns at this time.     Arrival mode: Personal vehicle    PCP: Jameel Almaguer MD       Past Medical History:  Past Medical History:   Diagnosis Date    A-V fistula     RIGHT UPPER ARM    Anemia in chronic kidney disease 8/16/2012    Anticoagulant long-term use     Arthritis     Atrial flutter     Cancer     RIGHT RENAL CELL;  "SKIN CA    Chronic ischemic heart disease 8/20/2013    Chronic systolic congestive heart failure 4/12/2016    Coronary artery disease 8/16/2012    Depression     Dialysis patient     M-W-F    Elbow fracture     Encounter for blood transfusion     ESRD (end stage renal disease) on dialysis 8/16/2012    Fractured coccyx     Gout, unspecified 8/16/2012    Heel bone fracture     Hypertension 8/20/2013    Hypertension, renal 8/16/2012    Ischemic cardiomyopathy 8/20/2013    Kidney stones     Macular degeneration     Mixed hyperlipidemia 8/20/2013    Myocardial infarction     Neuromuscular disorder     JEROME (obstructive sleep apnea) 4/12/2016    JEROME on CPAP     Paroxysmal atrial flutter 5/12/2016    Personal history of skin cancer     Rheumatoid arthritis(714.0) 8/16/2012    Secondary hyperparathyroidism of renal origin 8/16/2012       Past Surgical History:  Past Surgical History:   Procedure Laterality Date    AV FISTULA PLACEMENT      REVISION X2    CARDIAC CATHETERIZATION      JOINT REPLACEMENT Right     hip    PARTIAL NEPHRECTOMY Right 2008    for renal cell cancer - "stage 1" per patient.    SKIN BIOPSY      TONSILLECTOMY           Family History:  Family History   Problem Relation Age of Onset    Hypertension Mother     Kidney disease Mother     Heart disease Mother     Parkinsonism Father     Cancer Brother      testicular cancer    Hyperlipidemia Brother     Hypertension Brother     Kidney disease Maternal Aunt        Social History:  Social History     Social History Main Topics    Smoking status: Never Smoker    Smokeless tobacco: Never Used    Alcohol use No      Comment: Drank only while serving in [employer]    Drug use: No    Sexual activity: Yes     Partners: Female       ROS   Review of Systems   Constitutional: Negative for diaphoresis and fatigue.   Respiratory: Negative for cough and shortness of breath.    Cardiovascular: Negative for chest pain and " palpitations.   Gastrointestinal: Negative for abdominal pain, nausea and vomiting.   Musculoskeletal: Positive for gait problem. Negative for back pain and neck pain.        (+) Left hip pain  (-) Knee pain   Skin: Positive for wound (Left elbow laceration). Negative for color change and rash.   Neurological: Negative for dizziness, weakness, light-headedness, numbness and headaches.     Physical Exam    Initial Vitals   BP Pulse Resp Temp SpO2   03/12/17 1802 03/12/17 1802 03/12/17 1802 03/12/17 1802 03/12/17 1802   147/82 64 18 97.6 °F (36.4 °C) 97 %      Physical Exam  Nursing Notes and Vital Signs Reviewed.  Constitutional: Patient is in mild distress. Awake and alert. Well-developed and well-nourished.  Head: Atraumatic. Normocephalic.  Eyes: PERRL. EOM intact. Conjunctivae are not pale. No scleral icterus.  ENT: Mucous membranes are moist. Oropharynx is clear and symmetric.    Neck: Supple. Full ROM. No lymphadenopathy.  Cardiovascular: Regular rate. Regular rhythm. No murmurs, rubs, or gallops. Distal pulses are 2+ and symmetric.  Pulmonary/Chest: No respiratory distress. Clear to auscultation bilaterally. No wheezing, rales, or rhonchi.  Abdominal: Soft and non-distended.  There is no tenderness.  No rebound, guarding, or rigidity. Good bowel sounds.  Musculoskeletal: No obvious deformities. No edema. No calf tenderness.  LLE: +evident deformity. Negative for swelling. Positive for tenderness to left hip joint. ROM is reduced secondary to pain. Cap refill distally is <2 seconds. DP and PT pulses are equal and 2+ bilaterally. No motor deficit. No distal sensory deficit. Left leg is shortened. Left hip is internally rotated.  Severe tenderness of the hip joint no tenderness of the greater trochanteric area.  Patient clinically has left hip fracture  Skin: Warm and dry.  Left elbow has laceration with no active bleeding site of the laceration is 0.3×2 cm  Neurological:  Alert, awake, and appropriate.  Normal  "speech.  No acute focal neurological deficits are appreciated.  Psychiatric: Normal affect. Good eye contact. Appropriate in content.    ED Course    Procedures  ED Vital Signs:  Vitals:    03/12/17 1802   BP: (!) 147/82   Pulse: 64   Resp: 18   Temp: 97.6 °F (36.4 °C)   TempSrc: Oral   SpO2: 97%   Weight: 77.8 kg (171 lb 8.3 oz)   Height: 5' 10" (1.778 m)     Imaging Results:  Imaging Results         X-Ray Femur AP/LAT Left (In process)         X-Ray Pelvis Routine AP (In process)     Procedure changed from X-Ray Hip 2 View Left            X-Ray Chest 1 View (In process)                   The Emergency Provider reviewed the vital signs and test results, which are outlined above.    ED Discussion      8:10 PM: Dr. Reilly discussed the pt's case with Dr. Holloway (Orthopedics) states he will see pt.      8:10 PM: Discussed case with Dr. King (Hospital Medicine).  agrees with current care and management of pt and accepts admission.   Admitting Service: Hospital medicine   Admitting Physician: Dr. King  Admit to: Med/ surg    8:11 PM: Re-evaluated pt. I have discussed test results, shared treatment plan, and the need for admission with patient and family at bedside. Pt and family express understanding at this time and agree with all information. All questions answered. Pt and family have no further questions or concerns at this time. Pt is ready for admit.          ED Medication(s):  Medications   hydromorphone (PF) injection 1 mg (1 mg Intramuscular Given 3/12/17 6493)       New Prescriptions    No medications on file             Medical Decision Making    Medical Decision Making:   Clinical Tests:   Radiological Study: Ordered and Reviewed           Scribe Attestation:   Scribe #1: I performed the above scribed service and the documentation accurately describes the services I performed. I attest to the accuracy of the note.    Attending:   Physician Attestation Statement for Scribe #1: I, Cuauhtemoc Reilly MD, " personally performed the services described in this documentation, as scribed by Jordan Addison, in my presence, and it is both accurate and complete.          Clinical Impression       ICD-10-CM ICD-9-CM   1. Closed left hip fracture, initial encounter S72.002A 820.8   2. Fall (on) (from) other stairs and steps, initial encounter W10.8XXA E880.9   3. ESRD (end stage renal disease) N18.6 585.6       Disposition:   Disposition: Admitted  Condition: Fair         Cuauhtemoc Reilly MD  03/12/17 3181

## 2017-03-13 NOTE — PLAN OF CARE
Patient received hd for 3 hours today. Net removal 1.5 liters. No access problems. Dr. Alexander visited during hd.

## 2017-03-13 NOTE — H&P
History & Physical  Hospital Medicine      SUBJECTIVE:     Chief Complaint/Reason for Admission: Fall, L hip pain     History of Present Illness:  Mr. Quinn Hutchins is a 71 y.o. with a past medical history of ESRD on HD M/W/F via L UE fistula , atrial flutter, RCC , Ischemic cardiomyopathy with ICD , Gout, COPD  , HTN , HLD , JEROME on CPAP , chronic lower back pain here post fall. Today he states he was working outside states he lost his footing and fell on concrete.  He denies lightheadedness, dizzy ness or weekness preceding fall. States he pretty clearly lost his footing. States he had some phototherapy to his arms and was told not to do anything strenuous for a few days. States he fell on left hip and had subsequent L hip pain. Couldn't bear weight , thus presented to the ER. Otherwise denies complaints.         ED course : Dilaudid 1mg  ED vitals:   Initial Vitals   BP Pulse Resp Temp SpO2   03/12/17 1802 03/12/17 1802 03/12/17 1802 03/12/17 1802 03/12/17 1802   147/82 64 18 97.6 °F (36.4 °C) 97 %             Facility-Administered Medications Prior to Admission   Medication    hyaluronate sodium, stabilized Syrg 4 mL    phytonadione (vitamin K1) tablet 5 mg     PTA Medications   Medication Sig    amiodarone (PACERONE) 400 MG tablet Take 1 tablet (400 mg total) by mouth 2 (two) times daily. (Patient taking differently: Take 200 mg by mouth once daily. )    carvedilol (COREG) 25 MG tablet Take 0.5 tablets (12.5 mg total) by mouth 2 (two) times daily with meals.    cloNIDine (CATAPRES) 0.3 MG tablet Take 0.3 mg by mouth as needed.    clopidogrel (PLAVIX) 75 mg tablet Take 75 mg by mouth once daily.    docusate sodium (COLACE) 100 MG capsule Take 1 capsule (100 mg total) by mouth 3 (three) times daily as needed for Constipation.    acetaminophen (TYLENOL) 500 MG tablet Take 500 mg by mouth every 6 (six) hours as needed for Pain.    acitretin (SORIATANE) 10 MG capsule Take 10 mg by mouth every evening.      aspirin (ECOTRIN) 81 MG EC tablet Take 81 mg by mouth once daily.    atorvastatin (LIPITOR) 40 MG tablet TAKE 1 TABLET BY MOUTH EVERY EVENING    isosorbide mononitrate (IMDUR) 30 MG 24 hr tablet Take 1 tablet (30 mg total) by mouth once daily.    loratadine (CLARITIN) 10 mg tablet Take 10 mg by mouth once daily.    methylPREDNISolone (MEDROL, LIANNE,) 4 mg tablet use as directed    mupirocin (BACTROBAN) 2 % ointment Apply to wound infection 2 times a week for 7 days (Patient taking differently: Apply topically as needed. Apply to wound infection 2 times a week for 7 days)    nitroGLYCERIN (NITROSTAT) 0.4 MG SL tablet Place 1 tablet (0.4 mg total) under the tongue every 5 (five) minutes as needed for Chest pain.    ondansetron (ZOFRAN-ODT) 4 MG TbDL Take 2 tablets (8 mg total) by mouth every 8 (eight) hours as needed.    pantoprazole (PROTONIX) 40 MG tablet Take 1 tablet (40 mg total) by mouth once daily.    predniSONE (DELTASONE) 5 MG tablet TAKE 1 TABLET(S) ORAL (BY MOUTH) EVERY DAY    pyridoxine (VITAMIN B-6) 200 mg Tab Take 1 tablet by mouth every evening.     RANEXA 500 mg Tb12 TAKE 1 TABLET TWICE A DAY    sertraline (ZOLOFT) 100 MG tablet Take 50 mg by mouth once daily.     sevelamer carbonate (RENVELA) 800 mg Tab Take 1 tablet (800 mg total) by mouth 3 (three) times daily with meals.    thiamine 100 MG tablet Take 1 tablet (100 mg total) by mouth once daily.    tramadol (ULTRAM) 50 mg tablet Take 1 tablet (50 mg total) by mouth every 6 (six) hours as needed for Pain.        Review of patient's allergies indicates:   Allergen Reactions    Codeine Other (See Comments)     Hallucination    Hydrocodone Other (See Comments)     Impairs vision; AMS    Percocet [oxycodone-acetaminophen] Other (See Comments)     Makes him go crazy      Doxycycline Rash and Other (See Comments)     Vision impairment  Other reaction(s): Unknown    Levaquin [levofloxacin] Rash     Blisters    Xanax [alprazolam]  "Palpitations     pychosis        Past Medical History:   Diagnosis Date    A-V fistula     RIGHT UPPER ARM    Anemia in chronic kidney disease 8/16/2012    Anticoagulant long-term use     Arthritis     Atrial flutter     Cancer     RIGHT RENAL CELL; SKIN CA    Chronic ischemic heart disease 8/20/2013    Chronic systolic congestive heart failure 4/12/2016    Coronary artery disease 8/16/2012    Depression     Dialysis patient     M-W-F    Elbow fracture     Encounter for blood transfusion     ESRD (end stage renal disease) on dialysis 8/16/2012    Fractured coccyx     Gout, unspecified 8/16/2012    Heel bone fracture     Hypertension 8/20/2013    Hypertension, renal 8/16/2012    Ischemic cardiomyopathy 8/20/2013    Kidney stones     Macular degeneration     Mixed hyperlipidemia 8/20/2013    Myocardial infarction     Neuromuscular disorder     JEROME (obstructive sleep apnea) 4/12/2016    JEROME on CPAP     Paroxysmal atrial flutter 5/12/2016    Personal history of skin cancer     Rheumatoid arthritis(714.0) 8/16/2012    Secondary hyperparathyroidism of renal origin 8/16/2012       Past Surgical History:   Procedure Laterality Date    AV FISTULA PLACEMENT      REVISION X2    CARDIAC CATHETERIZATION      JOINT REPLACEMENT Right     hip    PARTIAL NEPHRECTOMY Right 2008    for renal cell cancer - "stage 1" per patient.    SKIN BIOPSY      TONSILLECTOMY         Family History   Problem Relation Age of Onset    Hypertension Mother     Kidney disease Mother     Heart disease Mother     Parkinsonism Father     Cancer Brother      testicular cancer    Hyperlipidemia Brother     Hypertension Brother     Kidney disease Maternal Aunt         Social History     Social History    Marital status:      Spouse name: N/A    Number of children: N/A    Years of education: N/A     Occupational History          Social History Main Topics    Smoking status: Never Smoker    " Smokeless tobacco: Never Used    Alcohol use No      Comment: Drank only while serving in [employer]    Drug use: No    Sexual activity: Yes     Partners: Female     Other Topics Concern    None     Social History Narrative       Review of Systems:  Constitutional: No fever or chills, no weight changes.  Eyes: No visual changes or photophobia  HEENT: No nasal congestion or sore throat  Respiratory: No cough or shorness of breath  Cardiovascular: No chest pain or palpitations  Gastrointestinal: No nausea or vomiting, no diarrhea or change in bowel habits  Genitourinary: No hematuria or dysuria  Skin: No rash or pruritis  Hematologic/lymphatic: No easy bruising, bleeding or lymphadenopathy  MSK : Left hip pain       OBJECTIVE:     Temp:  [97.6 °F (36.4 °C)] 97.6 °F (36.4 °C)  Pulse:  [64-69] 68  Resp:  [18] 18  SpO2:  [97 %-100 %] 100 %  BP: (147-174)/(82-92) 174/92  Body mass index is 24.61 kg/(m^2).     Physical Exam:  General appearance: Well developed, well nourished  HEENT:  Normocephalic, atruamatic, conjunctivae normal, sclarea anictric, PERRL, Mucus membranes moist,  Trachea midline , no JVD   Lungs: Clear to auscultation bilaterally and normal respiratory effort. ICD lateral chest wall  Heart: Regular rate and rhythm, S1, S2 normal, no murmur, click, rub or gallop  Abdomen: Soft, non-tender, non-distended; bowel sounds normal; no masses, no organomegaly  Extremities: No cyanosis or clubbing. No edema  Pulses: 2+ and symmetric  Skin: Left elbow laceration.  LUE HD fistula c/d/i + thrill + bruit . Diffuse psoriatic skin changes   MSK : Left hip , tendernes sto palpation         Laboratory: Reviewed and noted  where applicable- Please see chart for full lab data.  Creat 7.9     Diagnostic Tests:  EKG:     CXR: Moderate cardiomegaly.  No acute process.    Hip Xray : Acute left intertrochanteric femoral neck fracture with varus attenuation deformity.    CT Head: no acute path     ASSESSMENT/PLAN:     Assessment  Mr. Quinn Hutchins is a 71 y.o. with a past medical history of ESRD on HD M/W/F via L UE fistula , atrial flutter, RCC , Ischemic cardiomyopathy, Gout , COPD , HTN , HLD , JEROME on CPAP , chronic lower back pain here post fall.      Plan     1. L hip fracture   NPo   Ortho consult   PT/OT   Analgesia       2. ESRD on HD   Not overloaded   Nephrology consult       3. ICM   Warm and dry  Coreg  ASA/Plavix   Statin   Consult cards for antiplatelet, ICD management perioperatively     4. HTN   Come meds    5. Atrial Flutter  On amiodarone     6. HLD   Statin     7 JEROME on CPAP   Continue     8 COPD   On chronic steroids     9. DVT prophy  scds                 Code Status : full   Dispo: inpt     Jake King D.O  [facility] Medicine

## 2017-03-13 NOTE — PLAN OF CARE
Problem: Patient Care Overview  Goal: Plan of Care Review  Outcome: Ongoing (interventions implemented as appropriate)  Received to room 560 from ED. Left hip fx. Pain managed appropriately . Will monitor.

## 2017-03-13 NOTE — PROGRESS NOTES
Renal addendum note:  Pt was seen and re-evaluated during HD today by me.  Pt was tolerating HD well  No active or new c/o's.    A/P ESRD  Tolerating HD well, continue HD    Talia Alexander MD

## 2017-03-13 NOTE — CONSULTS
Consult Note  Cardiology    Consult Requested By: Dr. King  Reason for Consult: Pre-op evaluation     SUBJECTIVE:     History of Present Illness:  Patient is a 71 y.o. male with a PMHx of ESRD on HD, atrial flutter s/p LUZ/DCCV 7/15, ICM s/p AICD< COPD, HTN, hyperlipidemia, and JEROME who presented to [facility] ED yesterday after suffering a fall at home. Patient was reportedly moving some equipment outside when he tripped and fell, landing on his left hip. Post-fall, he was unable to bear weight on his left left, which prompted ED evaluation. ED workup subsequently revealed acute left intertrochanteric neck fracture and patient was subsequently admitted for further evaluation and treatment. Cardiology consulted for pre-op evaluation. Patient seen and examined today, resting in bed. He is well known to cardiology service and followed routinely by Dr. Anne in clinic. Complains of left hip pain at time of exam. No cardiac complaints. Denies any recent episodes of bernadette chest pain or tightness. Admits to chronic, stable MACIAS, no worse than usual. Denies any lightheadedness, palpitations, near syncope, or syncope. He is compliant with his medications and dialysis schedule. Chart reviewed. Last 2D echo 10/16 showed EF of 20%, moderate MR, DD, and pulmonary HTN. Last LHC by Dr. Anne in 6/16 showed stable CAD, apical LAD occlusion and OM sub-branch stenosis, medical management recommended.   Review of patient's allergies indicates:   Allergen Reactions    Codeine Other (See Comments)     Hallucination    Hydrocodone Other (See Comments)     Impairs vision; AMS    Percocet [oxycodone-acetaminophen] Other (See Comments)     Makes him go crazy      Doxycycline Rash and Other (See Comments)     Vision impairment  Other reaction(s): Unknown    Levaquin [levofloxacin] Rash     Blisters    Xanax [alprazolam] Palpitations     pychosis        Past Medical History:   Diagnosis Date    A-V fistula     RIGHT UPPER ARM    Anemia in  "chronic kidney disease 8/16/2012    Anticoagulant long-term use     Arthritis     Atrial flutter     Cancer     RIGHT RENAL CELL; SKIN CA    Chronic ischemic heart disease 8/20/2013    Chronic systolic congestive heart failure 4/12/2016    Coronary artery disease 8/16/2012    Depression     Dialysis patient     M-W-F    Elbow fracture     Encounter for blood transfusion     ESRD (end stage renal disease) on dialysis 8/16/2012    Fractured coccyx     Gout, unspecified 8/16/2012    Heel bone fracture     Hypertension 8/20/2013    Hypertension, renal 8/16/2012    Ischemic cardiomyopathy 8/20/2013    Kidney stones     Macular degeneration     Mixed hyperlipidemia 8/20/2013    Myocardial infarction     Neuromuscular disorder     JEROME (obstructive sleep apnea) 4/12/2016    JEROME on CPAP     Paroxysmal atrial flutter 5/12/2016    Personal history of skin cancer     Rheumatoid arthritis(714.0) 8/16/2012    Secondary hyperparathyroidism of renal origin 8/16/2012     Past Surgical History:   Procedure Laterality Date    AV FISTULA PLACEMENT      REVISION X2    CARDIAC CATHETERIZATION      JOINT REPLACEMENT Right     hip    PARTIAL NEPHRECTOMY Right 2008    for renal cell cancer - "stage 1" per patient.    SKIN BIOPSY      TONSILLECTOMY       Family History   Problem Relation Age of Onset    Hypertension Mother     Kidney disease Mother     Heart disease Mother     Parkinsonism Father     Cancer Brother      testicular cancer    Hyperlipidemia Brother     Hypertension Brother     Kidney disease Maternal Aunt      Social History   Substance Use Topics    Smoking status: Never Smoker    Smokeless tobacco: Never Used    Alcohol use No      Comment: Drank only while serving in [place]        Review of Systems:  Constitutional: negative  Eyes: negative  Ears, nose, mouth, throat, and face: negative  Respiratory: +chronic stable MACIAS  Cardiovascular: negative  Gastrointestinal: " negative  Genitourinary:negative  Hematologic/lymphatic: +easy bruising/bleeding  Musculoskeletal: +left hip pain   Neurological: negative  Behavioral/Psych: negative  Endocrine: negative  Allergic/Immunologic: negative    OBJECTIVE:     Vital Signs (Most Recent)  Temp: 97.6 °F (36.4 °C) (03/13/17 1010)  Pulse: 74 (03/13/17 1100)  Resp: 18 (03/13/17 1100)  BP: (!) 149/98 (03/13/17 1100)  SpO2: 96 % (03/13/17 0829)    Vital Signs Range (Last 24H):  Temp:  [97.6 °F (36.4 °C)-98.2 °F (36.8 °C)]   Pulse:  [64-84]   Resp:  [18]   BP: (126-178)/(82-99)   SpO2:  [96 %-100 %]     Current Facility-Administered Medications   Medication    albuterol-ipratropium 2.5mg-0.5mg/3mL nebulizer solution 3 mL    amiodarone tablet 200 mg    aspirin EC tablet 81 mg    atorvastatin tablet 40 mg    carvedilol tablet 12.5 mg    clopidogrel tablet 75 mg    docusate sodium capsule 100 mg    hydromorphone (PF) injection 1 mg    isosorbide mononitrate 24 hr tablet 30 mg    ondansetron disintegrating tablet 8 mg    pantoprazole EC tablet 40 mg    predniSONE tablet 5 mg    ramelteon tablet 8 mg    ranolazine 12 hr tablet 500 mg    sertraline tablet 50 mg    sevelamer carbonate tablet 800 mg    sodium chloride 0.9% injection 3 mL    thiamine tablet 100 mg     No current facility-administered medications on file prior to encounter.      Current Outpatient Prescriptions on File Prior to Encounter   Medication Sig    amiodarone (PACERONE) 400 MG tablet Take 1 tablet (400 mg total) by mouth 2 (two) times daily. (Patient taking differently: Take 200 mg by mouth once daily. )    carvedilol (COREG) 25 MG tablet Take 0.5 tablets (12.5 mg total) by mouth 2 (two) times daily with meals.    cloNIDine (CATAPRES) 0.3 MG tablet Take 0.3 mg by mouth as needed.    clopidogrel (PLAVIX) 75 mg tablet Take 75 mg by mouth once daily.    docusate sodium (COLACE) 100 MG capsule Take 1 capsule (100 mg total) by mouth 3 (three) times daily as needed  for Constipation.    acetaminophen (TYLENOL) 500 MG tablet Take 500 mg by mouth every 6 (six) hours as needed for Pain.    acitretin (SORIATANE) 10 MG capsule Take 10 mg by mouth every evening.     aspirin (ECOTRIN) 81 MG EC tablet Take 81 mg by mouth once daily.    atorvastatin (LIPITOR) 40 MG tablet TAKE 1 TABLET BY MOUTH EVERY EVENING    isosorbide mononitrate (IMDUR) 30 MG 24 hr tablet Take 1 tablet (30 mg total) by mouth once daily.    loratadine (CLARITIN) 10 mg tablet Take 10 mg by mouth once daily.    methylPREDNISolone (MEDROL, LIANNE,) 4 mg tablet use as directed    mupirocin (BACTROBAN) 2 % ointment Apply to wound infection 2 times a week for 7 days (Patient taking differently: Apply topically as needed. Apply to wound infection 2 times a week for 7 days)    nitroGLYCERIN (NITROSTAT) 0.4 MG SL tablet Place 1 tablet (0.4 mg total) under the tongue every 5 (five) minutes as needed for Chest pain.    ondansetron (ZOFRAN-ODT) 4 MG TbDL Take 2 tablets (8 mg total) by mouth every 8 (eight) hours as needed.    pantoprazole (PROTONIX) 40 MG tablet Take 1 tablet (40 mg total) by mouth once daily.    predniSONE (DELTASONE) 5 MG tablet TAKE 1 TABLET(S) ORAL (BY MOUTH) EVERY DAY    pyridoxine (VITAMIN B-6) 200 mg Tab Take 1 tablet by mouth every evening.     RANEXA 500 mg Tb12 TAKE 1 TABLET TWICE A DAY    sertraline (ZOLOFT) 100 MG tablet Take 50 mg by mouth once daily.     sevelamer carbonate (RENVELA) 800 mg Tab Take 1 tablet (800 mg total) by mouth 3 (three) times daily with meals.    thiamine 100 MG tablet Take 1 tablet (100 mg total) by mouth once daily.    tramadol (ULTRAM) 50 mg tablet Take 1 tablet (50 mg total) by mouth every 6 (six) hours as needed for Pain.       Physical Exam:  General appearance: alert, appears stated age and cooperative  Head: Normocephalic, without obvious abnormality, atraumatic  Neck: no adenopathy, no carotid bruit, +JVD  Back:  no skin lesions, erythema, or  scars  Lungs: Faint rales at bases  Chest wall: AICD site well-healed  Heart: regular rate and rhythm, S1, S2 normal, no murmur, click, rub or gallop  Abdomen: soft, non-tender; bowel sounds normal; no masses,  no organomegaly  Extremities: extremities normal, atraumatic, LUE fistula +bruit and thrill  Skin: Skin color, texture, turgor normal. Scattered lesions bilateral upper extremitites  Neurologic: Grossly normal    Laboratory:  Chemistry:   Lab Results   Component Value Date     03/13/2017    K 5.5 (H) 03/13/2017    CL 98 03/13/2017    CO2 25 03/13/2017    BUN 65 (H) 03/13/2017    CREATININE 8.2 (H) 03/13/2017    CALCIUM 9.7 03/13/2017     Cardiac Markers:   Lab Results   Component Value Date    CKMB 0.9 10/08/2012    TROPONINI 0.114 (H) 01/28/2017    TROPONINI 0.04 10/08/2012     Cardiac Markers (Last 3):   Lab Results   Component Value Date    CKMB 0.9 10/08/2012    CKMB 2.4 08/03/2011    CKMB 3.0 08/01/2011    TROPONINI 0.114 (H) 01/28/2017    TROPONINI 0.123 (H) 01/28/2017    TROPONINI 0.103 (H) 01/27/2017    TROPONINI 0.04 10/08/2012    TROPONINI 0.21 (H) 08/03/2011    TROPONINI 0.92 (H) 08/01/2011     CBC:   Lab Results   Component Value Date    WBC 8.94 03/13/2017    HGB 13.5 (L) 03/13/2017    HCT 41.3 03/13/2017    HCT 35 (L) 12/27/2016     (H) 03/13/2017     03/13/2017     Lipids:   Lab Results   Component Value Date    CHOL 112 (L) 05/12/2016    TRIG 114 05/12/2016    HDL 29 (L) 05/12/2016     Coagulation:   Lab Results   Component Value Date    INR 1.0 03/12/2017    APTT 26.8 03/12/2017       Diagnostic Results:  ECG: Reviewed  X-Ray: Reviewed  Echo: Reviewed      ASSESSMENT/PLAN:     Patient Active Problem List   Diagnosis    Organ transplant candidate    Hypertension, renal    Rheumatoid arthritis involving both hands with negative rheumatoid factor    Coronary artery disease    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    Secondary  hyperparathyroidism of renal origin    Refractive error - Both Eyes    Chronic ischemic heart disease    Mixed hyperlipidemia    Ischemic cardiomyopathy    Abnormal ECG    Skin cancer of scalp    ESRD (end stage renal disease)    Knee pain    Osteoporosis    Essential hypertension    A-V fistula    Problem with dialysis shunt    JEROME (obstructive sleep apnea)    Chronic systolic congestive heart failure    GERD (gastroesophageal reflux disease)    Syncope    LVH (left ventricular hypertrophy)    Pulmonary HTN    Paroxysmal atrial flutter    Cervical spinal stenosis    Atrial flutter with rapid ventricular response    Back pain    Complication of AV dialysis fistula    Current chronic use of systemic steroids    SVT (supraventricular tachycardia)    NSVT (nonsustained ventricular tachycardia)    Hematoma of arm    Renal failure    Osteoarthritis of right knee    S/P implantation of automatic cardioverter/defibrillator (AICD)    Elevated troponin I level    Nausea & vomiting    Closed left hip fracture      Patient with significant cardiac history who presents with left hip fracture in need of surgical repair. Given his cardiac status and multiple co-morbidities, patient is ok to proceed with surgery at high risk of doe and post-op CV complications. Ok to hold ASA and Plavix for now, restart ASAP post-surgery. Recommend dialyzing patient before surgery as well, nephrology on board.   Plan:   -Ok to proceed at high risk of doe and post op CV complications  -Hold ASA and Plavix, restart post-surgery ASAP  -Dialyze patient  -Will be available prn    Chart reviewed. Dr. Nieves examined patient and agrees with plan as outlined above.

## 2017-03-13 NOTE — PLAN OF CARE
CM attempted to meet with pt for d/c planning, but pt not currently in his room due to being in dialysis treatment.  Pt's family members in the room informed CM that pt also is scheduled for procedure at 3pm.  CM will re-attempt to meet with pt after dialysis and/or after procedure.       03/13/17 1341   Discharge Assessment   Assessment Type Discharge Planning Assessment   Confirmed/corrected address and phone number on facesheet? Yes

## 2017-03-13 NOTE — ANESTHESIA PREPROCEDURE EVALUATION
03/13/2017  Quinn Hutchins is a 71 y.o., male.    [facility] Pre-op Assessment    I have reviewed the Patient Summary Reports.    I have reviewed the Nursing Notes.   I have reviewed the Medications.   Prednisone    Review of Systems  Anesthesia Hx:  No problems with previous Anesthesia    Social:  Non-Smoker, No Alcohol Use    Hematology/Oncology:         -- Anemia: -- Coag Disorders: Bleeding Disorder: currently taking: clopidogrel   --  Cancer in past history:  Other (see Oncology comments) right surgery  Oncology Comments: Kidney  skin     EENT/Dental:EENT/Dental Normal   Cardiovascular:   Exercise tolerance: poor Pacemaker Hypertension, well controlled Past MI CAD  Dysrhythmias  CHF hyperlipidemia ECG has been reviewed. Atrial Flutter  Defibrillator  EKG 01/27/2017  Normal sinus rhythm  Possible Left atrial enlargement  LVH  Nonspecific ST abnormality  Prolonged QT  Abnormal ECG  When compared with ECG of 27-DEC-2016 06:45,  Criteria for Anteroseptal infarct are no longer Present  ST now depressed in Lateral leads  T wave inversion more evident in Lateral leads  Confirmed by TAMMY GARRIDO, SAMEERA CEVALLOS ([id number]) on 1/29/2017 6:43:29 AM  ECHO 10/25/2016  CONCLUSIONS     1 - Severe left ventricular enlargement.     2 - Biatrial enlargement.     3 - Eccentric hypertrophy.     4 - Severely depressed left ventricular systolic function (EF 20-25%).     5 - Left ventricular diastolic dysfunction.     6 - Right ventricular enlargement with moderately to severely depressed systolic function.     7 - Pulmonary hypertension. The estimated PA systolic pressure is 55 mmHg.     8 - Intermediate central venous pressure.     9 - Mildly enlarged ascending aorta.   Pulmonary:   Sleep Apnea, CPAP    Renal/:   Chronic Renal Disease, ESRD Dialysis MWF,  Last dialysis 03/10/2017   Hepatic/GI:   GERD, well controlled    Musculoskeletal:    Arthritis     Neurological:   Generalized weakness  Peripheral Neuropathy    Endocrine:  Endocrine Normal    Dermatological:   Bruising and sores to hesham arms, face   Psych:   depression          Physical Exam  General:  Well nourished    Airway/Jaw/Neck:  Airway Findings: Mouth Opening: Normal Tongue: Normal  General Airway Assessment: Adult  Mallampati: I  TM Distance: Normal, at least 6 cm  Jaw/Neck Findings:  Neck ROM: Normal ROM      Dental:  Dental Findings: In tact    Chest/Lungs:  Chest/Lungs Findings: Clear to auscultation, Normal Respiratory Rate     Heart/Vascular:  Heart Findings: Rate: Normal  Rhythm: Regular Rhythm  Sounds: Normal        Mental Status:  Mental Status Findings:  Cooperative, Alert and Oriented         Anesthesia Plan  Type of Anesthesia, risks & benefits discussed:  Anesthesia Type:  general  Patient's Preference:   Intra-op Monitoring Plan:   Intra-op Monitoring Plan Comments:   Post Op Pain Control Plan:   Post Op Pain Control Plan Comments:   Induction:   IV  Beta Blocker:  Patient is on a Beta-Blocker and has received one dose within the past 24 hours (No further documentation required).       Informed Consent: Patient understands risks and agrees with Anesthesia plan.  Questions answered. Anesthesia consent signed with patient.  ASA Score: 4  emergent   Day of Surgery Review of History & Physical: I have interviewed and examined the patient. I have reviewed the patient's H&P dated: 03/13/2017. There are no significant changes.

## 2017-03-13 NOTE — PT/OT/SLP PROGRESS
Physical Therapy      Quinn Hutchins  MRN: [id number]    P.TDeidre KLINE INITIATED THIS AM VIA CHART REVIEW, PT GOING FOR SX TODAY S/P FEMUR FX.,  WILL COMPLETE P.T. EVAL FOLLOWING SX. WITH NEW ORDER FROM MD Nicci Montalvo, PT   3/13/2017  0800

## 2017-03-13 NOTE — CONSULTS
Nephrology consult note:  Date of consult: 3/13/17  Reason for consult: ESRD on HD  Referring physician: Dr. Jake King    Quinn Hutchins is a 71 y.o. male for whom nephrology consult has been requested to evaluate and give opinion.     HPI: Pt was seen and examined. H/o reviewed. Pt is a 72 y/o man with ESRD on chronic HD q MWF was admitted with a L hip fx after a fall in his yard. Pt is due for HD today. He was seen earlier today and was revisited and re-evalauted twice while receiving HD treatment. Pt does have some lft hip fx, but no other sx's. No CP, no SOB, no fever. Other providers' notes reviewed.    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); Chronic systolic congestive heart failure (4/12/2016); Coronary artery disease (8/16/2012); Depression; Dialysis patient; Elbow fracture; Encounter for blood transfusion; ESRD (end stage renal disease) on dialysis (8/16/2012); Fractured coccyx; Gout, unspecified (8/16/2012); Heel bone fracture; Hypertension (8/20/2013); Hypertension, renal (8/16/2012); Ischemic cardiomyopathy (8/20/2013); Kidney stones; Macular degeneration; Mixed hyperlipidemia (8/20/2013); Myocardial infarction; Neuromuscular disorder; JEROME (obstructive sleep apnea) (4/12/2016); JEROME on CPAP; Paroxysmal atrial flutter (5/12/2016); Personal history of skin cancer; Rheumatoid arthritis(714.0) (8/16/2012); and Secondary hyperparathyroidism of renal origin (8/16/2012).    PAST SURGICAL HISTORY:  He  has a past surgical history that includes Partial nephrectomy (Right, 2008); Tonsillectomy; AV fistula placement; Cardiac catheterization; Joint replacement (Right); and Skin biopsy.    SOCIAL HISTORY:  He  reports that he has never smoked. He has never used smokeless tobacco. He reports that he does not drink alcohol or use  illicit drugs.    FAMILY MEDICAL HISTORY:  His family history includes Cancer in his brother; Heart disease in his mother; Hyperlipidemia in his brother; Hypertension in his brother and mother; Kidney disease in his maternal aunt and mother; Parkinsonism in his father.    Review of patient's allergies indicates:   Allergen Reactions    Codeine Other (See Comments)     Hallucination    Hydrocodone Other (See Comments)     Impairs vision; AMS    Percocet [oxycodone-acetaminophen] Other (See Comments)     Makes him go crazy      Doxycycline Rash and Other (See Comments)     Vision impairment  Other reaction(s): Unknown    Levaquin [levofloxacin] Rash     Blisters    Xanax [alprazolam] Palpitations     pychosis         amiodarone  200 mg Oral Daily    aspirin  81 mg Oral Daily    atorvastatin  40 mg Oral QHS    carvedilol  12.5 mg Oral BID WM    clopidogrel  75 mg Oral Daily    isosorbide mononitrate  30 mg Oral Daily    pantoprazole  40 mg Oral Daily    predniSONE  5 mg Oral Daily    ranolazine  500 mg Oral BID    sertraline  50 mg Oral Daily    sevelamer carbonate  800 mg Oral TID WM    sodium chloride 0.9%  3 mL Intravenous Q8H    thiamine  100 mg Oral Daily       Prior to Admission medications    Medication Sig Start Date End Date Taking? Authorizing Provider   amiodarone (PACERONE) 400 MG tablet Take 1 tablet (400 mg total) by mouth 2 (two) times daily.  Patient taking differently: Take 200 mg by mouth once daily.  10/8/16 10/8/17 Yes Reji Ramirez NP   carvedilol (COREG) 25 MG tablet Take 0.5 tablets (12.5 mg total) by mouth 2 (two) times daily with meals. 10/26/16 10/26/17 Yes Lucas Dickson MD   cloNIDine (CATAPRES) 0.3 MG tablet Take 0.3 mg by mouth as needed.   Yes Historical Provider, MD   clopidogrel (PLAVIX) 75 mg tablet Take 75 mg by mouth once daily.   Yes Historical Provider, MD   docusate sodium (COLACE) 100 MG capsule Take 1 capsule (100 mg total) by mouth 3 (three) times  daily as needed for Constipation. 5/16/16  Yes Sherry Treadwell NP   acetaminophen (TYLENOL) 500 MG tablet Take 500 mg by mouth every 6 (six) hours as needed for Pain.    Historical Provider, MD   acitretin (SORIATANE) 10 MG capsule Take 10 mg by mouth every evening.  4/30/15   Historical Provider, MD   aspirin (ECOTRIN) 81 MG EC tablet Take 81 mg by mouth once daily.    Historical Provider, MD   atorvastatin (LIPITOR) 40 MG tablet TAKE 1 TABLET BY MOUTH EVERY EVENING 7/5/16   Rowdy Sosa MD   isosorbide mononitrate (IMDUR) 30 MG 24 hr tablet Take 1 tablet (30 mg total) by mouth once daily. 12/30/16 12/30/17  Darron Anne MD   loratadine (CLARITIN) 10 mg tablet Take 10 mg by mouth once daily.    Historical Provider, MD   methylPREDNISolone (MEDROL, LIANNE,) 4 mg tablet use as directed 2/9/17   Jameel Almaguer MD   mupirocin (BACTROBAN) 2 % ointment Apply to wound infection 2 times a week for 7 days  Patient taking differently: Apply topically as needed. Apply to wound infection 2 times a week for 7 days 3/29/16   Jameel Almaguer MD   nitroGLYCERIN (NITROSTAT) 0.4 MG SL tablet Place 1 tablet (0.4 mg total) under the tongue every 5 (five) minutes as needed for Chest pain. 4/25/16   Reji Ramirez NP   ondansetron (ZOFRAN-ODT) 4 MG TbDL Take 2 tablets (8 mg total) by mouth every 8 (eight) hours as needed. 1/28/17   Aline Uriostegui NP   pantoprazole (PROTONIX) 40 MG tablet Take 1 tablet (40 mg total) by mouth once daily. 4/12/16 4/12/17  Darron Anne MD   predniSONE (DELTASONE) 5 MG tablet TAKE 1 TABLET(S) ORAL (BY MOUTH) EVERY DAY 9/23/16   Mary Ariza NP   pyridoxine (VITAMIN B-6) 200 mg Tab Take 1 tablet by mouth every evening.     Historical Provider, MD   RANEXA 500 mg Tb12 TAKE 1 TABLET TWICE A DAY 12/12/16   Darron Anne MD   sertraline (ZOLOFT) 100 MG tablet Take 50 mg by mouth once daily.     Historical Provider, MD   sevelamer carbonate (RENVELA) 800 mg Tab Take 1 tablet (800 mg total) by mouth 3  "(three) times daily with meals. 5/16/16 5/16/17  Sherry Treadwell NP   thiamine 100 MG tablet Take 1 tablet (100 mg total) by mouth once daily. 5/16/16   Sherry Treadwell NP   tramadol (ULTRAM) 50 mg tablet Take 1 tablet (50 mg total) by mouth every 6 (six) hours as needed for Pain. 12/28/16   Nabeel Perez MD        REVIEW OF SYSTEMS:  Patient has no fever, fatigue, visual changes, chest pain, edema, cough, dyspnea, nausea, vomiting, constipation, diarrhea, arthralgias, pruritis, dizziness, weakness, depression, confusion.      Intake/Output Summary (Last 24 hours) at 03/13/17 1404  Last data filed at 03/13/17 1200   Gross per 24 hour   Intake                0 ml   Output                0 ml   Net                0 ml       PHYSICAL EXAM:   height is 5' 10" (1.778 m) and weight is 77.8 kg (171 lb 8.3 oz). His oral temperature is 97.6 °F (36.4 °C). His blood pressure is 119/81 and his pulse is 73. His respiration is 18 and oxygen saturation is 96%.   Gen: WDWN male in no apparent distress  Psych: Normal mood and affect  Skin: No rashes or ulcers  Eyes: Normal conjunctiva and lids, PERRLA  ENT: Normal hearing  Neck: No JVD  Chest: Clear with no rales, rhonchi, wheezing with normal effort  CV: Regular with no murmurs, gallops or rubs  Abd: Soft, nontender, no distension, positive bowel sounds  Ext: No cyanosis, clubbing or edema  L hip tender to touch      Recent Labs  Lab 03/12/17 1930 03/13/17  0544    140   K 4.8 5.5*    98   CO2 25 25   BUN 61* 65*   CREATININE 7.9* 8.2*   CALCIUM 9.7 9.7       Recent Labs  Lab 03/12/17 1930 03/13/17  0544   WBC 11.24 8.94   HGB 14.2 13.5*   HCT 42.4 41.3    193     Femur X ray:  Acute intertrochanteric femoral neck fracture.    IMPRESSION AND RECOMMENDATIONS:  72 y/o male with ESRD on chronic HD presented with left hip fx:    1. Renal: ESRD pt, chronic HD  q MWF HD treatment  Mild hyperkalemia  BP controlled  Acid base normal  Being dialyzed today " without any problems  Continue HD    2. Ortho: s/p fall  Acute intertrochanteric femoral neck fracture.  Planned ORIF today    Plans and recommendations:  As discussed above  Pt received multiple visits and evaluations during HD today by me.  Pt doing well, stable form our view, continue HD  Total time spent 50 minutes including time needed to review the records, the   patient evaluation, documentation, face-to-face discussion with the patient,   more than 50% of the time was spent on coordination of care and counseling.    Level V visit.    Talia Alexander MD

## 2017-03-13 NOTE — CONSULTS
Consult Note  Orthopedics    Consult Requested By: Dr. Posadas  Reason for Consult: Left hip fracture    SUBJECTIVE:     History of Present Illness:  Patient is a 71 y.o. male presents with closed left intertrochanteric fracture after fall.  Reports constant, severe pain, with inability to ambulate.    Scheduled Meds:   [START ON 3/13/2017] famotidine  20 mg Oral Daily     Continuous Infusions:   PRN Meds:HYDROmorphone, ondansetron, ramelteon    Review of patient's allergies indicates:   Allergen Reactions    Codeine Other (See Comments)     Hallucination    Hydrocodone Other (See Comments)     Impairs vision; AMS    Percocet [oxycodone-acetaminophen] Other (See Comments)     Makes him go crazy      Doxycycline Rash and Other (See Comments)     Vision impairment  Other reaction(s): Unknown    Levaquin [levofloxacin] Rash     Blisters    Xanax [alprazolam] Palpitations     pychosis        Past Medical History:   Diagnosis Date    A-V fistula     RIGHT UPPER ARM    Anemia in chronic kidney disease 8/16/2012    Anticoagulant long-term use     Arthritis     Atrial flutter     Cancer     RIGHT RENAL CELL; SKIN CA    Chronic ischemic heart disease 8/20/2013    Chronic systolic congestive heart failure 4/12/2016    Coronary artery disease 8/16/2012    Depression     Dialysis patient     M-W-F    Elbow fracture     Encounter for blood transfusion     ESRD (end stage renal disease) on dialysis 8/16/2012    Fractured coccyx     Gout, unspecified 8/16/2012    Heel bone fracture     Hypertension 8/20/2013    Hypertension, renal 8/16/2012    Ischemic cardiomyopathy 8/20/2013    Kidney stones     Macular degeneration     Mixed hyperlipidemia 8/20/2013    Myocardial infarction     Neuromuscular disorder     JEROME (obstructive sleep apnea) 4/12/2016    JEROME on CPAP     Paroxysmal atrial flutter 5/12/2016    Personal history of skin cancer     Rheumatoid arthritis(714.0) 8/16/2012    Secondary  "hyperparathyroidism of renal origin 8/16/2012     Past Surgical History:   Procedure Laterality Date    AV FISTULA PLACEMENT      REVISION X2    CARDIAC CATHETERIZATION      JOINT REPLACEMENT Right     hip    PARTIAL NEPHRECTOMY Right 2008    for renal cell cancer - "stage 1" per patient.    SKIN BIOPSY      TONSILLECTOMY       Family History   Problem Relation Age of Onset    Hypertension Mother     Kidney disease Mother     Heart disease Mother     Parkinsonism Father     Cancer Brother      testicular cancer    Hyperlipidemia Brother     Hypertension Brother     Kidney disease Maternal Aunt      Social History   Substance Use Topics    Smoking status: Never Smoker    Smokeless tobacco: Never Used    Alcohol use No      Comment: Drank only while serving in [place]        Review of Systems:  As per HPI    OBJECTIVE:     Vital Signs (Most Recent)  Temp: 97.6 °F (36.4 °C) (03/12/17 1802)  Pulse: 68 (03/12/17 2138)  Resp: 18 (03/12/17 2138)  BP: (!) 174/92 (03/12/17 2045)  SpO2: 100 % (03/12/17 2138)    Vital Signs Range (Last 24H):  Temp:  [97.6 °F (36.4 °C)]   Pulse:  [64-69]   Resp:  [18]   BP: (147-174)/(82-92)   SpO2:  [97 %-100 %]     Physical Exam:  left Hip:   Extremity shortened  No open wounds  Thigh compartment soft  At ankle motor/sensory/vascular WNL    BUE: No localized tenderness, no open wounds, motor/sensory/vascular WNL  RLE: No localized tenderness, no open wounds, motor/sensory/vascular WNL    Laboratory:  Recent Results (from the past 72 hour(s))   CBC auto differential    Collection Time: 03/12/17  7:30 PM   Result Value Ref Range    WBC 11.24 3.90 - 12.70 K/uL    RBC 4.25 (L) 4.60 - 6.20 M/uL    Hemoglobin 14.2 14.0 - 18.0 g/dL    Hematocrit 42.4 40.0 - 54.0 %     (H) 82 - 98 fL    MCH 33.4 (H) 27.0 - 31.0 pg    MCHC 33.5 32.0 - 36.0 %    RDW 18.8 (H) 11.5 - 14.5 %    Platelets 176 150 - 350 K/uL    MPV 9.9 9.2 - 12.9 fL    Gran # 8.6 (H) 1.8 - 7.7 K/uL    Lymph # 1.1 1.0 " - 4.8 K/uL    Mono # 1.2 (H) 0.3 - 1.0 K/uL    Eos # 0.3 0.0 - 0.5 K/uL    Baso # 0.02 0.00 - 0.20 K/uL    Gran% 76.3 (H) 38.0 - 73.0 %    Lymph% 10.1 (L) 18.0 - 48.0 %    Mono% 10.9 4.0 - 15.0 %    Eosinophil% 2.5 0.0 - 8.0 %    Basophil% 0.2 0.0 - 1.9 %    Differential Method Automated    Basic metabolic panel    Collection Time: 03/12/17  7:30 PM   Result Value Ref Range    Sodium 142 136 - 145 mmol/L    Potassium 4.8 3.5 - 5.1 mmol/L    Chloride 101 95 - 110 mmol/L    CO2 25 23 - 29 mmol/L    Glucose 90 70 - 110 mg/dL    BUN, Bld 61 (H) 8 - 23 mg/dL    Creatinine 7.9 (H) 0.5 - 1.4 mg/dL    Calcium 9.7 8.7 - 10.5 mg/dL    Anion Gap 16 8 - 16 mmol/L    eGFR if African American 7 (A) >60 mL/min/1.73 m^2    eGFR if non African American 6 (A) >60 mL/min/1.73 m^2   APTT    Collection Time: 03/12/17  7:30 PM   Result Value Ref Range    aPTT 26.8 21.0 - 32.0 sec   Protime-INR    Collection Time: 03/12/17  7:30 PM   Result Value Ref Range    Prothrombin Time 10.4 9.0 - 12.5 sec    INR 1.0 0.8 - 1.2       Diagnostic Results:  X-Ray: Reviewed    ASSESSMENT/PLAN:     Closed displaced Intertrochanteric fracture    1. NPO aftermidnight  2. Medical Optimization  3. ORIF Left Intertroch fracture when cleared  4. Risks, benefits, alternatives, and follow up discussed.

## 2017-03-13 NOTE — TRANSFER OF CARE
"Anesthesia Transfer of Care Note    Patient: Quinn Hutchins    Procedure(s) Performed: Procedure(s) (LRB):  OPEN REDUCTION INTERNAL FIXATION-FEMUR HANA Espino & nephew (Left)    Patient location: PACU    Anesthesia Type: general    Transport from OR: Transported from OR on room air with adequate spontaneous ventilation    Post pain: adequate analgesia    Post assessment: no apparent anesthetic complications    Post vital signs: stable    Level of consciousness: oriented, alert and awake    Nausea/Vomiting: no nausea/vomiting    Complications: none          Last vitals:   Visit Vitals    BP (!) 175/95 (BP Location: Right arm, Patient Position: Lying, BP Method: Automatic)    Pulse 77    Temp 36.8 °C (98.2 °F) (Temporal)    Resp 20    Ht 5' 10" (1.778 m)    Wt 77.8 kg (171 lb 8.3 oz)    SpO2 100%    BMI 24.61 kg/m2     "

## 2017-03-13 NOTE — PLAN OF CARE
Pt remains free of injuries. Pain moderately controlled c PRN meds and relaxation techniques. HD today. Sx today c Dr. Pittman for femoral neck fx. 12 hr chart check completed. Will continue to monitor.

## 2017-03-13 NOTE — PLAN OF CARE
Escorted to preop via pt bed and accompanied by rn and his son and brothers x2. preop completed. Dr blunt at bedside. Pt marked. Consents completed and pt awaiting surgery.

## 2017-03-14 PROCEDURE — 63600175 PHARM REV CODE 636 W HCPCS: Performed by: INTERNAL MEDICINE

## 2017-03-14 PROCEDURE — 99233 SBSQ HOSP IP/OBS HIGH 50: CPT | Mod: ,,, | Performed by: INTERNAL MEDICINE

## 2017-03-14 PROCEDURE — 25000003 PHARM REV CODE 250: Performed by: ORTHOPAEDIC SURGERY

## 2017-03-14 PROCEDURE — G8979 MOBILITY GOAL STATUS: HCPCS | Mod: CK

## 2017-03-14 PROCEDURE — 97110 THERAPEUTIC EXERCISES: CPT

## 2017-03-14 PROCEDURE — G8978 MOBILITY CURRENT STATUS: HCPCS | Mod: CL

## 2017-03-14 PROCEDURE — 97530 THERAPEUTIC ACTIVITIES: CPT

## 2017-03-14 PROCEDURE — 97116 GAIT TRAINING THERAPY: CPT

## 2017-03-14 PROCEDURE — 25000003 PHARM REV CODE 250: Performed by: NURSE PRACTITIONER

## 2017-03-14 PROCEDURE — 63600175 PHARM REV CODE 636 W HCPCS: Performed by: ORTHOPAEDIC SURGERY

## 2017-03-14 PROCEDURE — 25000003 PHARM REV CODE 250: Performed by: INTERNAL MEDICINE

## 2017-03-14 PROCEDURE — 11000001 HC ACUTE MED/SURG PRIVATE ROOM

## 2017-03-14 PROCEDURE — 97162 PT EVAL MOD COMPLEX 30 MIN: CPT

## 2017-03-14 PROCEDURE — 63600175 PHARM REV CODE 636 W HCPCS: Performed by: EMERGENCY MEDICINE

## 2017-03-14 PROCEDURE — 21400001 HC TELEMETRY ROOM

## 2017-03-14 PROCEDURE — 97166 OT EVAL MOD COMPLEX 45 MIN: CPT

## 2017-03-14 RX ORDER — HYDROMORPHONE HYDROCHLORIDE 1 MG/ML
1 INJECTION, SOLUTION INTRAMUSCULAR; INTRAVENOUS; SUBCUTANEOUS EVERY 4 HOURS PRN
Status: DISCONTINUED | OUTPATIENT
Start: 2017-03-14 | End: 2017-03-16 | Stop reason: HOSPADM

## 2017-03-14 RX ORDER — HYDROMORPHONE HYDROCHLORIDE 1 MG/ML
1 INJECTION, SOLUTION INTRAMUSCULAR; INTRAVENOUS; SUBCUTANEOUS EVERY 4 HOURS PRN
Status: DISCONTINUED | OUTPATIENT
Start: 2017-03-14 | End: 2017-03-14

## 2017-03-14 RX ADMIN — DEXTROSE 1 G: 50 INJECTION, SOLUTION INTRAVENOUS at 05:03

## 2017-03-14 RX ADMIN — SEVELAMER CARBONATE 800 MG: 800 TABLET, FILM COATED ORAL at 05:03

## 2017-03-14 RX ADMIN — HYDROMORPHONE HYDROCHLORIDE 1 MG: 1 INJECTION, SOLUTION INTRAMUSCULAR; INTRAVENOUS; SUBCUTANEOUS at 08:03

## 2017-03-14 RX ADMIN — ISOSORBIDE MONONITRATE 30 MG: 30 TABLET, EXTENDED RELEASE ORAL at 09:03

## 2017-03-14 RX ADMIN — SEVELAMER CARBONATE 800 MG: 800 TABLET, FILM COATED ORAL at 08:03

## 2017-03-14 RX ADMIN — ATORVASTATIN CALCIUM 40 MG: 40 TABLET, FILM COATED ORAL at 09:03

## 2017-03-14 RX ADMIN — SEVELAMER CARBONATE 800 MG: 800 TABLET, FILM COATED ORAL at 11:03

## 2017-03-14 RX ADMIN — SODIUM CHLORIDE, PRESERVATIVE FREE 3 ML: 5 INJECTION INTRAVENOUS at 02:03

## 2017-03-14 RX ADMIN — APIXABAN 2.5 MG: 2.5 TABLET, FILM COATED ORAL at 10:03

## 2017-03-14 RX ADMIN — CARVEDILOL 12.5 MG: 12.5 TABLET, FILM COATED ORAL at 08:03

## 2017-03-14 RX ADMIN — CARVEDILOL 12.5 MG: 12.5 TABLET, FILM COATED ORAL at 05:03

## 2017-03-14 RX ADMIN — DEXTROSE 1 G: 50 INJECTION, SOLUTION INTRAVENOUS at 08:03

## 2017-03-14 RX ADMIN — PANTOPRAZOLE SODIUM 40 MG: 40 TABLET, DELAYED RELEASE ORAL at 09:03

## 2017-03-14 RX ADMIN — Medication 100 MG: at 09:03

## 2017-03-14 RX ADMIN — PREDNISONE 5 MG: 5 TABLET ORAL at 08:03

## 2017-03-14 RX ADMIN — CLOPIDOGREL BISULFATE 75 MG: 75 TABLET ORAL at 09:03

## 2017-03-14 RX ADMIN — ASPIRIN 81 MG: 81 TABLET, COATED ORAL at 08:03

## 2017-03-14 RX ADMIN — SODIUM CHLORIDE, PRESERVATIVE FREE 3 ML: 5 INJECTION INTRAVENOUS at 06:03

## 2017-03-14 RX ADMIN — SERTRALINE HYDROCHLORIDE 50 MG: 50 TABLET, FILM COATED ORAL at 08:03

## 2017-03-14 RX ADMIN — HYDROMORPHONE HYDROCHLORIDE 1 MG: 1 INJECTION, SOLUTION INTRAMUSCULAR; INTRAVENOUS; SUBCUTANEOUS at 02:03

## 2017-03-14 RX ADMIN — SODIUM CHLORIDE, PRESERVATIVE FREE 3 ML: 5 INJECTION INTRAVENOUS at 09:03

## 2017-03-14 RX ADMIN — AMIODARONE HYDROCHLORIDE 200 MG: 200 TABLET ORAL at 09:03

## 2017-03-14 RX ADMIN — RANOLAZINE 500 MG: 500 TABLET, FILM COATED, EXTENDED RELEASE ORAL at 09:03

## 2017-03-14 RX ADMIN — HYDROMORPHONE HYDROCHLORIDE 1 MG: 1 INJECTION, SOLUTION INTRAMUSCULAR; INTRAVENOUS; SUBCUTANEOUS at 10:03

## 2017-03-14 RX ADMIN — HYDROMORPHONE HYDROCHLORIDE 1 MG: 1 INJECTION, SOLUTION INTRAMUSCULAR; INTRAVENOUS; SUBCUTANEOUS at 03:03

## 2017-03-14 NOTE — NURSING
Reinforced dressing on L hip, lower portion of incision leaking blood. Dressing saturated with blood on lower portion and large area on bed under lower dressing stained. Blue pad placed under leg to continue to monitor drainage. Paged Dr. Pittman but no response.

## 2017-03-14 NOTE — PLAN OF CARE
Problem: Patient Care Overview  Goal: Plan of Care Review  Outcome: Ongoing (interventions implemented as appropriate)  Pt remained free of injury during shift, stable condition, pain adequately controlled with PRN IV medication and sleeping between care, fistula had present bruit/thrill, dressing reinforced during shift with no new drainage, no acute distress, and will continue to monitor. 24hr chart review performed.

## 2017-03-14 NOTE — PT/OT/SLP PROGRESS
Physical Therapy  Treatment    Quinn Hutchins   MRN: [id number]   Admitting Diagnosis: <principal problem not specified>    PT Received On: 17  PT Start Time: 1105     PT Stop Time: 1130    PT Total Time (min): 25 min       Billable Minutes:  Gait [id number] and Therapeutic Exercise 15    Treatment Type: Treatment  PT/PTA: PT             General Precautions: Standard, fall  Orthopedic Precautions: LLE weight bearing as tolerated   Braces:           Subjective:  Communicated with NURSE JOAQUIM AND EPIC CHART REVIEW  prior to session.   PT AGREED TO TX     Pain Ratin/10  Location - Side: Left     Location: hip     Pain Rating Post-Intervention: 7/10    Objective:   Patient found with: peripheral IV, oxygen    Functional Mobility:  Bed Mobility:   Rolling/Turning Right: Moderate assistance  Scooting/Bridging: Minimum Assistance  Supine to Sit: Moderate Assistance    Transfers:  Sit <> Stand Assistance: Maximum Assistance  Sit <> Stand Assistive Device: Rolling Walker  Bed <> Chair Technique: Stand Pivot  Bed <> Chair Assistance: Maximum Assistance  Bed <> Chair Assistive Device: Rolling Walker    Gait:   Gait Distance: PT GT TRAINED X 4' WITH RW AND STEP TO GT WITH MAX A  Assistance 1: Maximum assistance  Gait Assistive Device: Rolling walker  Gait Pattern: swing-to gait  Gait Deviation(s): decreased kiko, decreased weight-shifting ability, decreased step length    Balance:   Static Sit: GOOD-: Takes MODERATE challenges from all directions but inconsistently  Dynamic Sit: FAIR+: Maintains balance through MINIMAL excursions of active trunk motion  Static Stand: 0: Needs MAXIMAL assist to maintain   Dynamic stand: 0: N/A     Therapeutic Activities and Exercises:  PT GT TRAINED WITH RW X 4 STEPS WITH CHAIR IN TOW AND STEP TO GT AND DEC R FOOT CLEARANCE. PT T/F TO CHAIR WITH MA X A. PT COMPLETED 10 REPS FOR AP, HS, AND UE SHOULDER AND ELBOW FLEX /EXT. PT LEFT SEATED IN CHAIR WITH ALL NEEDS MET      AM-PAC 6 CLICK  MOBILITY  How much help from another person does this patient currently need?   1 = Unable, Total/Dependent Assistance  2 = A lot, Maximum/Moderate Assistance  3 = A little, Minimum/Contact Guard/Supervision  4 = None, Modified [place]/Independent         AM-PAC Raw Score CMS G-Code Modifier Level of Impairment Assistance   6 % Total / Unable   7 - 9 CM 80 - 100% Maximal Assist   10 - 14 CL 60 - 80% Moderate Assist   15 - 19 CK 40 - 60% Moderate Assist   20 - 22 CJ 20 - 40% Minimal Assist   23 CI 1-20% SBA / CGA   24 CH 0% Independent/ Mod I     Patient left up in chair with call button in reach.    Assessment:  PT PROGRESSING WITH GT. PT QUICKLY FATIGUES DURING TX     Rehab identified problem list/impairments: Rehab identified problem list/impairments: weakness, impaired endurance, impaired self care skills, gait instability, decreased lower extremity function, decreased upper extremity function, impaired balance, impaired functional mobilty, pain, decreased ROM, edema    Rehab potential is good.    Activity tolerance: Fair    Discharge recommendations: Discharge Facility/Level Of Care Needs: rehabilitation facility     Barriers to discharge: Barriers to Discharge: Decreased caregiver support    Equipment recommendations:       GOALS:   Physical Therapy Goals        Problem: Physical Therapy Goal    Goal Priority Disciplines Outcome Goal Variances Interventions   Physical Therapy Goal     PT/OT, PT      Description:  PT WILL BE SEEN FOR P.T. FOR A MIN OF 5 OUT OF 7 DAYS A WEEK  LTG: 3/21/17  1. PT WILL COMPLETE BED MOBILITY WITH CGA.  2. PT WILL STAND WITH RW AND MOD A FOR GT AND T/F  3. PT WILL GT TRAIN WITH RW AND MOD A X 50'  4. PT WILL COMPLETE B LE TE X 20 REPS FOR ROM AND STRENGTHENING.               PLAN:    Patient to be seen    to address the above listed problems via gait training, therapeutic activities, therapeutic exercises  Plan of Care expires: 03/21/17  Plan of Care reviewed with:  patient    PT G-Codes  Functional Assessment Tool Used: [place] pac  Score: CL  Functional Limitation: Mobility: Walking and moving around  Mobility: Walking and Moving Around Current Status (): CL  Mobility: Walking and Moving Around Goal Status (): CRISTÓBAL Sprague, PT  03/14/2017

## 2017-03-14 NOTE — PT/OT/SLP EVAL
"Physical Therapy  Evaluation    Quinn Hutchins   MRN: [id number]   Admitting Diagnosis: <principal problem not specified>    PT Received On: 03/14/17  PT Start Time: 0927     PT Stop Time: 1000    PT Total Time (min): 33 min       Billable Minutes:  Evaluation 18 and Therapeutic Activity 15    Diagnosis: <principal problem not specified>  P.T. DX: IMPAIRED GT      Past Medical History:   Diagnosis Date    A-V fistula     RIGHT UPPER ARM    Anemia in chronic kidney disease 8/16/2012    Anticoagulant long-term use     Arthritis     Atrial flutter     Cancer     RIGHT RENAL CELL; SKIN CA    Chronic ischemic heart disease 8/20/2013    Chronic systolic congestive heart failure 4/12/2016    Coronary artery disease 8/16/2012    Depression     Dialysis patient     M-W-F    Elbow fracture     Encounter for blood transfusion     ESRD (end stage renal disease) on dialysis 8/16/2012    Fractured coccyx     Gout, unspecified 8/16/2012    Heel bone fracture     Hypertension 8/20/2013    Hypertension, renal 8/16/2012    Ischemic cardiomyopathy 8/20/2013    Kidney stones     Macular degeneration     Mixed hyperlipidemia 8/20/2013    Myocardial infarction     Neuromuscular disorder     JEROME (obstructive sleep apnea) 4/12/2016    JEROME on CPAP     Paroxysmal atrial flutter 5/12/2016    Personal history of skin cancer     Rheumatoid arthritis(714.0) 8/16/2012    Secondary hyperparathyroidism of renal origin 8/16/2012      Past Surgical History:   Procedure Laterality Date    AV FISTULA PLACEMENT      REVISION X2    CARDIAC CATHETERIZATION      JOINT REPLACEMENT Right     hip    PARTIAL NEPHRECTOMY Right 2008    for renal cell cancer - "stage 1" per patient.    SKIN BIOPSY      TONSILLECTOMY         Referring physician: KAJAL  Date referred to PT: 3/14/2017      General Precautions: Standard, fall  Orthopedic Precautions: LLE weight bearing as tolerated   Braces:              Patient History:  Lives With: " spouse  Living Arrangements: house  Home Layout: Able to live on 1st floor  Transportation Available: family or friend will provide  Living Environment Comment: PT LIVES WITH WIFE AND WAS MOD I WITH SPC.  Equipment Currently Used at Home: walker, rolling, wheelchair, power chair, bedside commode, cane, straight, shower chair  DME owned (not currently used): none    Previous Level of Function:  Ambulation Skills: needs device  Transfer Skills: needs device  ADL Skills: needs device    Subjective:  Communicated with NURSE JOAQUIM AND EPIC CHART REVIEW  prior to session.   PT AGREED TO EVAL AND TX   Chief Complaint: PAIN  Patient goals: WALK    Pain Ratin/10   Location - Side: Left     Location: hip     Pain Rating Post-Intervention: 7/10    Objective:   Patient found with: peripheral IV, oxygen     Cognitive Exam:  Oriented to: Person, Place, Time and Situation    Follows Commands/attention: Follows multistep  commands  Communication: clear/fluent  Safety awareness/insight to disability: intact    Physical Exam:  Postural examination/scapula alignment: Rounded shoulder and Head forward    Skin integrity: Bruising of B UE   Edema: Mild L LE      Sensation:   Intact    Upper Extremity Range of Motion:  Right Upper Extremity: WFL  Left Upper Extremity: WFL    Upper Extremity Strength:  Right Upper Extremity: LIMITED  Left Upper Extremity: LIMITED    Lower Extremity Range of Motion:  Right Lower Extremity: WFL  Left Lower Extremity: LIMITED    Lower Extremity Strength:  Right Lower Extremity: WFL  Left Lower Extremity: LIMITED      Functional Mobility:  Bed Mobility:  Rolling/Turning Right: Moderate assistance  Supine to Sit: Minimum Assistance    Transfers:  Sit <> Stand Assistance: Maximum Assistance  Sit <> Stand Assistive Device: Rolling Walker  Bed <> Chair Technique: Stand Pivot  Bed <> Chair Assistance: Maximum Assistance (X2)    Gait:   Gait Distance: UNABLE    Balance:   Static Sit: GOOD: Takes MODERATE  challenges from all directions  Dynamic Sit: GOOD-: Maintains balance through MODERATE excursions of active trunk movement,     Static Stand: 0: Needs MAXIMAL assist to maintain   Dynamic stand: 0: N/A    Therapeutic Activities and Exercises:  PT STOOD X 2 ATTEMPTS HOWEVER UNABLE TO ADVANCE R LE D/T WB THROUGH L LE. PT T/F TO CHAIR WITH MAX A X2 AND DEC SAFETY WITH USE OF RW. PT LEFT SEATED IN CHAIR     AM-PAC 6 CLICK MOBILITY  How much help from another person does this patient currently need?   1 = Unable, Total/Dependent Assistance  2 = A lot, Maximum/Moderate Assistance  3 = A little, Minimum/Contact Guard/Supervision  4 = None, Modified [place]/Independent          AM-PAC Raw Score CMS G-Code Modifier Level of Impairment Assistance   6 % Total / Unable   7 - 9 CM 80 - 100% Maximal Assist   10 - 14 CL 60 - 80% Moderate Assist   15 - 19 CK 40 - 60% Moderate Assist   20 - 22 CJ 20 - 40% Minimal Assist   23 CI 1-20% SBA / CGA   24 CH 0% Independent/ Mod I     Patient left up in chair with call button in reach and FAMILY present.    Assessment:   Quinn Hutchins is a 71 y.o. male with a medical diagnosis of <principal problem not specified> and presents TO THERAPY S/P ORIF. PT WILL BENEFIT FROM P.T. TO ADDRESS IMPAIRMENTS    Rehab identified problem list/impairments: Rehab identified problem list/impairments: weakness, impaired endurance, impaired self care skills, gait instability, decreased coordination, decreased lower extremity function, pain, decreased ROM, edema, decreased upper extremity function, impaired balance, impaired functional mobilty    Rehab potential is good.    Activity tolerance: Fair    Discharge recommendations: Discharge Facility/Level Of Care Needs: rehabilitation facility     Barriers to discharge: Barriers to Discharge: Decreased caregiver support    Equipment recommendations:       GOALS:   Physical Therapy Goals        Problem: Physical Therapy Goal    Goal Priority  Disciplines Outcome Goal Variances Interventions   Physical Therapy Goal     PT/OT, PT      Description:  PT WILL BE SEEN FOR P.T. FOR A MIN OF 5 OUT OF 7 DAYS A WEEK  LTG: 3/21/17  1. PT WILL COMPLETE BED MOBILITY WITH CGA.  2. PT WILL STAND WITH RW AND MOD A FOR GT AND T/F  3. PT WILL GT TRAIN WITH RW AND MOD A X 50'  4. PT WILL COMPLETE B LE TE X 20 REPS FOR ROM AND STRENGTHENING.               PLAN:    Patient to be seen   to address the above listed problems via gait training, therapeutic activities, therapeutic exercises  Plan of Care expires: 03/21/17  Plan of Care reviewed with: patient    Functional Assessment Tool Used: [place] am pac  Score: CL  Functional Limitation: Mobility: Walking and moving around  Mobility: Walking and Moving Around Current Status (): CL  Mobility: Walking and Moving Around Goal Status (): CRISTÓBAL Sprague, PT  03/14/2017

## 2017-03-14 NOTE — SUBJECTIVE & OBJECTIVE
Interval History: pt seen and examined today with plan of care discussed. HD completed with 1.5 removed.  L hip repair performed per Orthopedic Surgery.      Review of Systems   Constitutional: Negative for activity change, chills and fever.   HENT: Negative for congestion, postnasal drip, sinus pressure, sneezing, sore throat and trouble swallowing.    Eyes: Negative for discharge, redness and visual disturbance.   Respiratory: Negative.  Negative for cough, chest tightness, shortness of breath and wheezing.    Cardiovascular: Negative.  Negative for chest pain, palpitations and leg swelling.   Gastrointestinal: Negative.  Negative for abdominal distention, abdominal pain, diarrhea, nausea and vomiting.   Endocrine: Negative.  Negative for cold intolerance and heat intolerance.   Genitourinary: Negative for difficulty urinating, dysuria, flank pain, frequency and urgency.   Musculoskeletal: Positive for arthralgias. Negative for back pain and myalgias.   Skin: Negative.  Negative for color change.   Allergic/Immunologic: Negative.  Negative for environmental allergies and food allergies.   Neurological: Negative.  Negative for dizziness, syncope, weakness and headaches.   Hematological: Negative.  Does not bruise/bleed easily.   Psychiatric/Behavioral: Negative.  Negative for agitation, confusion and sleep disturbance. The patient is not nervous/anxious.      Objective:     Vital Signs (Most Recent):  Temp: 97.8 °F (36.6 °C) (03/13/17 1847)  Pulse: 65 (03/13/17 1847)  Resp: 16 (03/13/17 1847)  BP: 136/79 (03/13/17 1847)  SpO2: 96 % (03/13/17 1847) Vital Signs (24h Range):  Temp:  [97.6 °F (36.4 °C)-98.2 °F (36.8 °C)] 97.8 °F (36.6 °C)  Pulse:  [65-84] 65  Resp:  [16-24] 16  SpO2:  [94 %-100 %] 96 %  BP: ()/(72-99) 136/79     Weight: 77.8 kg (171 lb 8.3 oz)  Body mass index is 24.61 kg/(m^2).    Intake/Output Summary (Last 24 hours) at 03/13/17 3390  Last data filed at 03/13/17 1740   Gross per 24 hour    Intake              500 ml   Output             2100 ml   Net            -1600 ml      Physical Exam   Constitutional: He is oriented to person, place, and time. He appears well-developed and well-nourished.   HENT:   Head: Normocephalic.   Nose: Nose normal.   Mouth/Throat: Oropharynx is clear and moist.   Eyes: Conjunctivae and EOM are normal.   Neck: Normal range of motion. No JVD present.   Cardiovascular: Normal rate, regular rhythm, normal heart sounds and intact distal pulses.    Pulmonary/Chest: Effort normal and breath sounds normal. No respiratory distress.   Abdominal: Soft. Bowel sounds are normal. He exhibits no distension.   Musculoskeletal: Normal range of motion.   Limited ROM to L hip s/p procedure  LUE AVG present with + thrill/+ bruit   Neurological: He is alert and oriented to person, place, and time.   Skin: Skin is warm and dry.   Bandage C/D/I to L hip   Psychiatric:   Pt lethargic post procedure       Significant Labs:   CBC:   Recent Labs  Lab 03/12/17 1930 03/13/17  0544   WBC 11.24 8.94   HGB 14.2 13.5*   HCT 42.4 41.3    193     CMP:   Recent Labs  Lab 03/12/17 1930 03/13/17  0544 03/13/17  1415    140  --    K 4.8 5.5* 4.6    98  --    CO2 25 25  --    GLU 90 99  --    BUN 61* 65*  --    CREATININE 7.9* 8.2*  --    CALCIUM 9.7 9.7  --    PROT  --  6.5  --    ALBUMIN  --  3.2*  --    BILITOT  --  0.6  --    ALKPHOS  --  106  --    AST  --  20  --    ALT  --  15  --    ANIONGAP 16 17*  --    EGFRNONAA 6* 6*  --        Significant Imaging:   Imaging Results         FL Greater Than 1 Hour (In process)         X-Ray Hip Intraoperative Left (Final result) Result time:  03/13/17 18:07:01    Final result by Taisha Vergara MD (03/13/17 18:07:01)    Impression:      As above.      Electronically signed by: TAISHA VERGARA MD  Date:     03/13/17  Time:    18:07     Narrative:    EXAM: XR HIP INTRAOPERATIVE LEFT    CLINICAL HISTORY:  Left femur intertrochanteric  fracture    FINDINGS:  3 intraoperative fluoroscopic images.  73.3 seconds fluoroscopy time.  Status post ORIF left femur fracture.            CT Head Without Contrast (Final result) Result time:  03/12/17 21:26:34    Final result by Amanda Hall MD (03/12/17 21:26:34)    Impression:        Stable head CT.        All CT scans at this facility use dose modulation, iterative reconstruction, and/or weight based dosing when appropriate to reduce radiation dose to as low as reasonably achievable.      Electronically signed by: AMANDA HALL  Date:     03/12/17  Time:    21:26     Narrative:    Exam: CT HEAD WITHOUT CONTRAST     Indication: Trauma.  Fall.  Injury to head.    Technique: Routine noncontrast head CT.    Comparison Study: 1/27/2017    Findings: No change.  No bleed.  Stable cerebral atrophy.  Stable ventricles.  Stable patchy deep white matter low attenuation characteristic of microangiopathy sequela.  No new abnormal density.  Gray-white differentiation preserved.  No mass.  Calvarium intact.  Sinuses and mastoids are well aerated.  No scalp swelling.            X-Ray Femur AP/LAT Left (Final result) Result time:  03/12/17 20:46:33    Final result by Amanda Hall MD (03/12/17 20:46:33)    Impression:        Acute intertrochanteric femoral neck fracture.      Electronically signed by: AMANDA HALL  Date:     03/12/17  Time:    20:46     Narrative:    Exam: Left femur x-ray 2 views    Indication: Trauma.  Injury to the left femur.    Findings: There is an acute intertrochanteric femoral neck fracture with varus angulation deformity.  Osteopenia.  Atherosclerosis.            X-Ray Pelvis Routine AP (Final result) Result time:  03/12/17 20:45:52    Procedure changed from X-Ray Hip 2 View Left        Final result by Amanda Hall MD (03/12/17 20:45:52)    Impression:        Acute left intertrochanteric femoral neck fracture with varus attenuation deformity.      Electronically signed by: AMANDA HALL  Date:      03/12/17  Time:    20:45     Narrative:    Exam: Pelvic x-ray single view    Indication: Trauma.  Fall with injury to    Indication: Trauma.  Fall.  Injury to pelvis.    Findings: There is an acute left-sided intertrochanteric femoral neck fracture with varus angulation deformity.  Right hip replacement.  Pelvic ring intact.  Bones appear osteopenic.  Atherosclerosis, extensive.            X-Ray Chest 1 View (Final result) Result time:  03/12/17 20:48:47    Final result by Amanda Hall MD (03/12/17 20:48:47)    Impression:        Moderate cardiomegaly.  No acute process.      Electronically signed by: AMANDA HALL  Date:     03/12/17  Time:    20:48     Narrative:    Exam: Chest x-ray single view    Indication: Trauma.  Fall.    Findings: Comparison study 1/27/2017.  Moderate cardiomegaly.  Left chest wall single lead AICD.  Aortic calcification.  No significant vascular congestion.  Clear lungs.  Right subclavicular vascular stent.

## 2017-03-14 NOTE — PLAN OF CARE
CM met with pt and brother for d/c planning.  Pt agreeable to PT/OT recommendation of rehab vs SNF, depending on pt's insurance.  Family preference is [facility]. Pt went there previously after hip replacement approximately 5 years ago.  Pt's receives dialysis with [facility] located in [facility] ([address]  CM will pursue placement either at rehab or SNF level of care.       03/14/17 1147   Discharge Assessment   Assessment Type Discharge Planning Assessment   Confirmed/corrected address and phone number on facesheet? Yes   Assessment information obtained from? Patient;Caregiver;Medical Record   Expected Length of Stay (days) (TBD)   Prior to hospitilization cognitive status: Alert/Oriented   Prior to hospitalization functional status: Needs Assistance   Current cognitive status: Alert/Oriented   Current Functional Status: Needs Assistance   Arrived From home or self-care   Lives With spouse   Able to Return to Prior Arrangements yes   Is patient able to care for self after discharge? No   How many people do you have in your home that can help with your care after discharge? 1   Who are your caregiver(s) and their phone number(s)? Naren Hutchins, spouse: [phone]   Patient's perception of discharge disposition home or selfcare   Readmission Within The Last 30 Days no previous admission in last 30 days   Patient currently being followed by outpatient case management? No   Patient currently receives home health services? No   Does the patient currently use HME? Yes   Patient currently receives private duty nursing? No   Patient currently receives any other outside agency services? No   Equipment Currently Used at Home walker, rolling;rollator;shower chair;bedside commode   Do you have any problems affording any of your prescribed medications? No   Is the patient taking medications as prescribed? yes   Do you have any financial concerns preventing you from receiving the healthcare you need?  No   Does the patient have transportation to healthcare appointments? Yes   Transportation Available family or friend will provide;car   On Dialysis? Yes   Are there any open cases? No   Discharge Plan A Rehab   Discharge Plan B Skilled Nursing Facility   Patient/Family In Agreement With Plan yes

## 2017-03-14 NOTE — PROGRESS NOTES
[facility] Hospital Medicine  Progress Note    Patient Name: Quinn Hutchins  MRN: [id number]  Patient Class: IP- Inpatient   Admission Date: 3/12/2017  Length of Stay: 1 days  Attending Physician: Fallon Garvey MD  Primary Care Provider: Jameel Almaguer MD        Subjective:     Principal Problem: Closed Hip Fracture    HPI:  Mr. Quinn Hutchins is a 71 y.o. with a past medical history of ESRD on HD M/W/F via L UE fistula , atrial flutter, RCC , Ischemic cardiomyopathy with ICD , Gout, COPD  , HTN , HLD , JEROME on CPAP , chronic lower back pain here post fall. Today he states he was working outside states he lost his footing and fell on concrete. He denies lightheadedness, dizzy ness or weekness preceding fall. States he pretty clearly lost his footing. States he had some phototherapy to his arms and was told not to do anything strenuous for a few days. States he fell on left hip and had subsequent L hip pain. Couldn't bear weight , thus presented to the ER. Otherwise denies complaints.          Hospital Course:  Pt admitted to Medical Surgical Unit for closed hip fracture with Orthopedic Surgery consulted.  HD completed today with 1.5 kg removed.  Left hip repair completed today.    Interval History: pt seen and examined today with plan of care discussed. HD completed with 1.5 removed.  L hip repair performed per Orthopedic Surgery.      Review of Systems   Constitutional: Negative for activity change, chills and fever.   HENT: Negative for congestion, postnasal drip, sinus pressure, sneezing, sore throat and trouble swallowing.    Eyes: Negative for discharge, redness and visual disturbance.   Respiratory: Negative.  Negative for cough, chest tightness, shortness of breath and wheezing.    Cardiovascular: Negative.  Negative for chest pain, palpitations and leg swelling.   Gastrointestinal: Negative.  Negative for abdominal distention, abdominal pain, diarrhea, nausea and vomiting.   Endocrine: Negative.   Negative for cold intolerance and heat intolerance.   Genitourinary: Negative for difficulty urinating, dysuria, flank pain, frequency and urgency.   Musculoskeletal: Positive for arthralgias. Negative for back pain and myalgias.   Skin: Negative.  Negative for color change.   Allergic/Immunologic: Negative.  Negative for environmental allergies and food allergies.   Neurological: Negative.  Negative for dizziness, syncope, weakness and headaches.   Hematological: Negative.  Does not bruise/bleed easily.   Psychiatric/Behavioral: Negative.  Negative for agitation, confusion and sleep disturbance. The patient is not nervous/anxious.      Objective:     Vital Signs (Most Recent):  Temp: 97.8 °F (36.6 °C) (03/13/17 1847)  Pulse: 65 (03/13/17 1847)  Resp: 16 (03/13/17 1847)  BP: 136/79 (03/13/17 1847)  SpO2: 96 % (03/13/17 1847) Vital Signs (24h Range):  Temp:  [97.6 °F (36.4 °C)-98.2 °F (36.8 °C)] 97.8 °F (36.6 °C)  Pulse:  [65-84] 65  Resp:  [16-24] 16  SpO2:  [94 %-100 %] 96 %  BP: ()/(72-99) 136/79     Weight: 77.8 kg (171 lb 8.3 oz)  Body mass index is 24.61 kg/(m^2).    Intake/Output Summary (Last 24 hours) at 03/13/17 1906  Last data filed at 03/13/17 1740   Gross per 24 hour   Intake              500 ml   Output             2100 ml   Net            -1600 ml      Physical Exam   Constitutional: He is oriented to person, place, and time. He appears well-developed and well-nourished.   HENT:   Head: Normocephalic.   Nose: Nose normal.   Mouth/Throat: Oropharynx is clear and moist.   Eyes: Conjunctivae and EOM are normal.   Neck: Normal range of motion. No JVD present.   Cardiovascular: Normal rate, regular rhythm, normal heart sounds and intact distal pulses.    Pulmonary/Chest: Effort normal and breath sounds normal. No respiratory distress.   Abdominal: Soft. Bowel sounds are normal. He exhibits no distension.   Musculoskeletal: Normal range of motion.   Limited ROM to L hip s/p procedure  LUE AVG present  with + thrill/+ bruit   Neurological: He is alert and oriented to person, place, and time.   Skin: Skin is warm and dry. Multiple abrasions present in various stages of healing  Bandage C/D/I to L hip   Psychiatric:   Pt lethargic post procedure       Significant Labs:   CBC:   Recent Labs  Lab 03/12/17 1930 03/13/17  0544   WBC 11.24 8.94   HGB 14.2 13.5*   HCT 42.4 41.3    193     CMP:   Recent Labs  Lab 03/12/17 1930 03/13/17  0544 03/13/17  1415    140  --    K 4.8 5.5* 4.6    98  --    CO2 25 25  --    GLU 90 99  --    BUN 61* 65*  --    CREATININE 7.9* 8.2*  --    CALCIUM 9.7 9.7  --    PROT  --  6.5  --    ALBUMIN  --  3.2*  --    BILITOT  --  0.6  --    ALKPHOS  --  106  --    AST  --  20  --    ALT  --  15  --    ANIONGAP 16 17*  --    EGFRNONAA 6* 6*  --        Significant Imaging:   Imaging Results         FL Greater Than 1 Hour (In process)         X-Ray Hip Intraoperative Left (Final result) Result time:  03/13/17 18:07:01    Final result by Taisha Vergara MD (03/13/17 18:07:01)    Impression:      As above.      Electronically signed by: TAISHA VERGARA MD  Date:     03/13/17  Time:    18:07     Narrative:    EXAM: XR HIP INTRAOPERATIVE LEFT    CLINICAL HISTORY:  Left femur intertrochanteric fracture    FINDINGS:  3 intraoperative fluoroscopic images.  73.3 seconds fluoroscopy time.  Status post ORIF left femur fracture.            CT Head Without Contrast (Final result) Result time:  03/12/17 21:26:34    Final result by Amanda Hall MD (03/12/17 21:26:34)    Impression:        Stable head CT.        All CT scans at this facility use dose modulation, iterative reconstruction, and/or weight based dosing when appropriate to reduce radiation dose to as low as reasonably achievable.      Electronically signed by: AMANDA HALL  Date:     03/12/17  Time:    21:26     Narrative:    Exam: CT HEAD WITHOUT CONTRAST     Indication: Trauma.  Fall.  Injury to head.    Technique: Routine  noncontrast head CT.    Comparison Study: 1/27/2017    Findings: No change.  No bleed.  Stable cerebral atrophy.  Stable ventricles.  Stable patchy deep white matter low attenuation characteristic of microangiopathy sequela.  No new abnormal density.  Gray-white differentiation preserved.  No mass.  Calvarium intact.  Sinuses and mastoids are well aerated.  No scalp swelling.            X-Ray Femur AP/LAT Left (Final result) Result time:  03/12/17 20:46:33    Final result by Amanda Hall MD (03/12/17 20:46:33)    Impression:        Acute intertrochanteric femoral neck fracture.      Electronically signed by: AMANDA HALL  Date:     03/12/17  Time:    20:46     Narrative:    Exam: Left femur x-ray 2 views    Indication: Trauma.  Injury to the left femur.    Findings: There is an acute intertrochanteric femoral neck fracture with varus angulation deformity.  Osteopenia.  Atherosclerosis.            X-Ray Pelvis Routine AP (Final result) Result time:  03/12/17 20:45:52    Procedure changed from X-Ray Hip 2 View Left        Final result by Amanda Hall MD (03/12/17 20:45:52)    Impression:        Acute left intertrochanteric femoral neck fracture with varus attenuation deformity.      Electronically signed by: AMANDA HALL  Date:     03/12/17  Time:    20:45     Narrative:    Exam: Pelvic x-ray single view    Indication: Trauma.  Fall with injury to    Indication: Trauma.  Fall.  Injury to pelvis.    Findings: There is an acute left-sided intertrochanteric femoral neck fracture with varus angulation deformity.  Right hip replacement.  Pelvic ring intact.  Bones appear osteopenic.  Atherosclerosis, extensive.            X-Ray Chest 1 View (Final result) Result time:  03/12/17 20:48:47    Final result by Amanda Hall MD (03/12/17 20:48:47)    Impression:        Moderate cardiomegaly.  No acute process.      Electronically signed by: AMANDA HALL  Date:     03/12/17  Time:    20:48     Narrative:    Exam: Chest x-ray  single view    Indication: Trauma.  Fall.    Findings: Comparison study 1/27/2017.  Moderate cardiomegaly.  Left chest wall single lead AICD.  Aortic calcification.  No significant vascular congestion.  Clear lungs.  Right subclavicular vascular stent.            Assessment/Plan:      Hypertension, renal  -home medications resumed      Mixed hyperlipidemia  -Statin continued      Ischemic cardiomyopathy   Coreg  ASA/Plavix   Statin   Consult cards for antiplatelet, ICD management perioperatively       ESRD (end stage renal disease)  -Nephrology following  -HD completed today with 1.5 kg removed      JEROME (obstructive sleep apnea)  -will continue to monitor        Paroxysmal atrial flutter  -Amiodarone continued    Current chronic use of systemic steroids  -secondary to COPD      Closed left hip fracture  -Pt admitted to Medical Surgical Unit  -Orthopedic Surgery consulted  -s/p repair today  -analgesia prn  -IV antibiotics    Fall (on) (from) other stairs and steps, initial encounter  -will continue to monitor  -fall precautions   -PT/OT evaluation s/p procedure      Hyperkalemia  -secondary to ESRD  -HD completed   -will continue to monitor      VTE Risk Mitigation         Ordered     High Risk of VTE  Once      03/13/17 1800     Place sequential compression device  Until discontinued      03/12/17 2244     Place ROBERT hose  Until discontinued      03/12/17 2020          Aline Uriostegui NP  Department of Hospital Medicine   [facility]

## 2017-03-14 NOTE — PROGRESS NOTES
"Renal f/u note:  Admit Date: 3/12/2017    Follow-up For:  ESRD on HD    Subjective:      Interval History: Pt was seen and examined. Stable last pm, no new events.  Tolerated HD well yesterday, no new c/o's today, no SOB. Does have some left hip pain.  Pt is s/p ORIF for displaced left intertrochanteric hip fracture after a fall in his yard.    Objective:      Vital Signs Range (Last 24H):Blood pressure (!) 146/75, pulse 77, temperature 97.7 °F (36.5 °C), temperature source Oral, resp. rate 16, height 5' 10" (1.778 m), weight 77.8 kg (171 lb 8.3 oz), SpO2 (!) 92 %.    Physical Exam   Constitutional: Patient is oriented to person, place, and time.   Sitting in chair  Cardiovascular: Normal rate and regular rhythm.  s1 and s2 audible  Pulmonary/Chest: Effort normal and clear to auscultation bilaterally. No respiratory distress.   Abdomen: Soft. Non-tender and non-distended. Bowel sounds are normal.   Neurological: Moves all extremities.  Skin: Warm and dry. No rashes.  Extremities: No edema.    Medications:   amiodarone  200 mg Oral Daily    aspirin  81 mg Oral Daily    atorvastatin  40 mg Oral QHS    carvedilol  12.5 mg Oral BID WM    ceFAZolin (ANCEF) IVPB  1 g Intravenous Q8H    clopidogrel  75 mg Oral Daily    isosorbide mononitrate  30 mg Oral Daily    pantoprazole  40 mg Oral Daily    predniSONE  5 mg Oral Daily    ranolazine  500 mg Oral BID    sertraline  50 mg Oral Daily    sevelamer carbonate  800 mg Oral TID WM    sodium chloride 0.9%  3 mL Intravenous Q8H    thiamine  100 mg Oral Daily       Laboratory Data:  Reviewed and noted in plan where applicable. Please see chart for full laboratory data.    Lab Results   Component Value Date    WBC 8.94 03/13/2017    HGB 13.5 (L) 03/13/2017    HCT 41.3 03/13/2017     (H) 03/13/2017     03/13/2017         Recent Labs  Lab 03/13/17  1415   K 4.6       Active Hospital Problems    Diagnosis  POA    Fall (on) (from) other stairs and steps, " initial encounter [W10.8XXA]  Unknown    Hyperkalemia [E87.5]  Unknown    Closed left hip fracture [S72.002A]  Yes    Current chronic use of systemic steroids [Z79.52]  Not Applicable     Chronic    Paroxysmal atrial flutter [I48.92]  Yes     Chronic    JEROME (obstructive sleep apnea) [G47.33]  Yes     Chronic    ESRD (end stage renal disease) [N18.6]  Yes     Chronic    Ischemic cardiomyopathy [I25.5]  Yes     Chronic    Mixed hyperlipidemia [E78.2]  Yes     Chronic    Hypertension, renal [I12.9]  Yes     Chronic      Resolved Hospital Problems    Diagnosis Date Resolved POA   No resolved problems to display.       A/P 72 y/o male with ESRD on chronic HD presented with left hip fx:     1. Renal: ESRD pt, chronic HD  q MWF HD treatment, continue routine Hd per schedule  Tolerating HD well  Mild hyperkalemia: corrected with HD, normal K  Acid base normal    2. HTN: BP controlled  Meds reviewed     3. Ortho: s/p ORIF for displaced left intertrochanteric hip fracture  Oer ortho, seems to be doing well post op.     Plans and recommendations:  As discussed above  HD in am  Will f/u.     Talia Alexander MD

## 2017-03-14 NOTE — PT/OT/SLP EVAL
"Occupational Therapy  Evaluation    Quinn Hutchins   MRN: [id number]   Admitting Diagnosis: <principal problem not specified>    OT Date of Treatment: 03/14/17   OT Start Time: 0922  OT Stop Time: 0945  OT Total Time (min): 23 min    Billable Minutes:  Evaluation 13 MINUTES  Therapeutic Activity 10 MINUTES    Diagnosis: <principal problem not specified>   IMPAIRED ADL'S, DECREASE B UE STRENGTH/ENDURANCE, IMPAIRED FUNCTIONAL MOBILITY AND IMPAIRED FUNCTIONAL T/F'S    Past Medical History:   Diagnosis Date    A-V fistula     RIGHT UPPER ARM    Anemia in chronic kidney disease 8/16/2012    Anticoagulant long-term use     Arthritis     Atrial flutter     Cancer     RIGHT RENAL CELL; SKIN CA    Chronic ischemic heart disease 8/20/2013    Chronic systolic congestive heart failure 4/12/2016    Coronary artery disease 8/16/2012    Depression     Dialysis patient     M-W-F    Elbow fracture     Encounter for blood transfusion     ESRD (end stage renal disease) on dialysis 8/16/2012    Fractured coccyx     Gout, unspecified 8/16/2012    Heel bone fracture     Hypertension 8/20/2013    Hypertension, renal 8/16/2012    Ischemic cardiomyopathy 8/20/2013    Kidney stones     Macular degeneration     Mixed hyperlipidemia 8/20/2013    Myocardial infarction     Neuromuscular disorder     JEROME (obstructive sleep apnea) 4/12/2016    JEROME on CPAP     Paroxysmal atrial flutter 5/12/2016    Personal history of skin cancer     Rheumatoid arthritis(714.0) 8/16/2012    Secondary hyperparathyroidism of renal origin 8/16/2012      Past Surgical History:   Procedure Laterality Date    AV FISTULA PLACEMENT      REVISION X2    CARDIAC CATHETERIZATION      JOINT REPLACEMENT Right     hip    PARTIAL NEPHRECTOMY Right 2008    for renal cell cancer - "stage 1" per patient.    SKIN BIOPSY      TONSILLECTOMY         Referring physician: DR SUAZO  Date referred to OT: 3-13-17      General Precautions: Standard, " fall  Orthopedic Precautions: LLE weight bearing as tolerated  Braces:            Patient History:  Living Environment  Lives With: spouse  Living Arrangements: house  Home Layout: Able to live on 1st floor  Transportation Available: none  Living Environment Comment: PT LIVES IN (I) TO MOD (I) WITH ADL'S , FUNCTIONAL MOBILITY AND T/F'S. PT REPORTS HAVING SPC HE DOES NOT USE ALL THE TIME  Equipment Currently Used at Home: walker, rolling, cane, straight, other (see comments) (POWER CHAIR)    Prior level of function:   Bed Mobility/Transfers: independent  Grooming: independent  Bathing: independent  Upper Body Dressing: independent  Lower Body Dressing: independent  Toileting: independent  Driving License: Yes     Dominant hand: right    Subjective:  Communicated with NURSE JOAQUIM AND EPIC CHART REVIEW prior to session.    Chief Complaint: IMPAIRED ADL'S, DECREASE B UE STRENGTH/ENDURANCE, IMPAIRED FUNCTIONAL MOBILITY AND IMPAIRED FUNCTIONAL T/F'S    Patient/Family stated goals:     Pain Ratin/10  Location - Side: Left  Location - Orientation: upper  Location: hip          Objective:  Patient found with: peripheral IV, oxygen    Cognitive Exam:  Oriented to: Person, Place, Time and Situation  Follows Commands/attention: Follows one-step commands  Communication: clear/fluent  Memory:  No Deficits noted  Safety awareness/insight to disability: impaired  Coping skills/emotional control: Appropriate to situation    Visual/perceptual:  Intact    Physical Exam:  Postural examination/scapula alignment: Rounded shoulder and Head forward  Skin integrity: Wound DRY SCABS ALL OVER, Dry scar DRY SCABS ALL OVER WITH BODY, Thin and Dry  Edema: Moderate L HIP ARE    Sensation:   Intact    Upper Extremity Range of Motion:  Right Upper Extremity: WFL  Left Upper Extremity: WFL    Upper Extremity Strength:  Right Upper Extremity: MMT: 3/5 GROSSLY  Left Upper Extremity: MMT: 3/5 GROSSLY   Strength: MMT: 3/5 GROSSLY    Fine  "motor coordination:   Intact    Gross motor coordination: WFL    Functional Mobility:  Bed Mobility:  Rolling/Turning Right: Moderate assistance  Scooting/Bridging: Moderate Assistance  Supine to Sit: Moderate Assistance    Transfers:  Sit <> Stand Assistance: Maximum Assistance (X2)  Sit <> Stand Assistive Device: Rolling Walker  Bed <> Chair Technique: Stand Pivot  Bed <> Chair Transfer Assistance: Maximum Assistance (X2)  Bed <> Chair Assistive Device: Rolling Walker    Functional Ambulation: UNABLE TO TOLERATE ACTIVITY    Activities of Daily Living:     Feeding adaptive equipment: NA  UE Dressing Level of Assistance: Maximum assistance  UE adaptive equipment: NA  LE Dressing Level of Assistance: Total assistance  LE adaptive equipment: NA        Toileting Where Assessed: Bed level  Toileting Level of Assistance: Total assistance (TRISTAN PLACEMENT)        Bathing adaptive equipment: NA    Balance:   Static Sit: FAIR+: Able to take MINIMAL challenges from all directions  Dynamic Sit: FAIR+: Maintains balance through MINIMAL excursions of active trunk motion  Static Stand: POOR-  Dynamic stand: UNABLE TO PERFORM ACTIVITY    Therapeutic Activities and Exercises:      AM-PAC 6 CLICK ADL  How much help from another person does this patient currently need?  1 = Unable, Total/Dependent Assistance  2 = A lot, Maximum/Moderate Assistance  3 = A little, Minimum/Contact Guard/Supervision  4 = None, Modified [place]/Independent         AM-PAC Raw Score CMS "G-Code Modifier Level of Impairment Assistance   6 % Total / Unable   7 - 9 CM 80 - 100% Maximal Assist   10 - 14 CL 60 - 80% Moderate Assist   15 - 19 CK 40 - 60% Moderate Assist   20 - 22 CJ 20 - 40% Minimal Assist   23 CI 1-20% SBA / CGA   24 CH 0% Independent/ Mod I       Patient left up in chair with all lines intact, call button in reach, NURSE JOAQUIM notified and FAMILY present    Assessment:  Quinn Hutchins is a 71 y.o. male with a medical diagnosis " of <principal problem not specified> and presents with IMPAIRED ADL'S, DECREASE B UE STRENGTH/ENDURANCE, IMPAIRED FUNCTIONAL MOBILITY AND IMPAIRED FUNCTIONAL T/F'S. PT WILL BENEFIT FROM SKILLED O.T.      Rehab identified problem list/impairments: Rehab identified problem list/impairments: weakness, impaired functional mobilty, impaired balance, impaired endurance, impaired self care skills, gait instability, decreased lower extremity function, decreased upper extremity function, decreased safety awareness, pain    Rehab potential is good.    Activity tolerance: Good    Discharge recommendations: Discharge Facility/Level Of Care Needs: rehabilitation facility     Barriers to discharge: Barriers to Discharge: Decreased caregiver support    Equipment recommendations: bath bench     GOALS:   Occupational Therapy Goals        Problem: Occupational Therapy Goal    Goal Priority Disciplines Outcome Interventions   Occupational Therapy Goal     OT, PT/OT     Description:  OT GOALS TO BE MET BY 3-21-17  1. PT WILL TOLERATE 1 SET X 15 REPS B UE ROM EXERCISE WITH MIN RESISTANCE  2. MIN A WITH UE DRESSING  3. MIN A WITH LE DRESSING              PLAN:  Patient to be seen 3 x/week to address the above listed problems via self-care/home management, therapeutic activities, therapeutic exercises  Plan of Care expires:    Plan of Care reviewed with: patient, family         Ilana Melgar, OT  03/14/2017

## 2017-03-14 NOTE — SUBJECTIVE & OBJECTIVE
Interval History:     Review of Systems   Constitutional: Negative for activity change, chills and fever.   HENT: Negative for congestion, postnasal drip, sinus pressure, sneezing, sore throat and trouble swallowing.    Eyes: Negative for discharge, redness and visual disturbance.   Respiratory: Negative.  Negative for cough, chest tightness, shortness of breath and wheezing.    Cardiovascular: Negative.  Negative for chest pain, palpitations and leg swelling.   Gastrointestinal: Negative.  Negative for abdominal distention, abdominal pain, diarrhea, nausea and vomiting.   Endocrine: Negative.  Negative for cold intolerance and heat intolerance.   Genitourinary: Negative for difficulty urinating, dysuria, flank pain, frequency and urgency.   Musculoskeletal: Positive for arthralgias. Negative for back pain and myalgias.   Skin: Negative.  Negative for color change.   Allergic/Immunologic: Negative.  Negative for environmental allergies and food allergies.   Neurological: Negative.  Negative for dizziness, syncope, weakness and headaches.   Hematological: Negative.  Does not bruise/bleed easily.   Psychiatric/Behavioral: Negative.  Negative for agitation, confusion and sleep disturbance. The patient is not nervous/anxious.      Objective:     Vital Signs (Most Recent):  Temp: 97.7 °F (36.5 °C) (03/14/17 1100)  Pulse: 77 (03/14/17 1100)  Resp: 16 (03/14/17 1100)  BP: (!) 146/75 (03/14/17 1100)  SpO2: (!) 92 % (03/14/17 1100) Vital Signs (24h Range):  Temp:  [97.5 °F (36.4 °C)-98.3 °F (36.8 °C)] 97.7 °F (36.5 °C)  Pulse:  [63-82] 77  Resp:  [14-24] 16  SpO2:  [92 %-100 %] 92 %  BP: (136-175)/(74-95) 146/75     Weight: 77.8 kg (171 lb 8.3 oz)  Body mass index is 24.61 kg/(m^2).    Intake/Output Summary (Last 24 hours) at 03/14/17 1454  Last data filed at 03/14/17 1054   Gross per 24 hour   Intake              921 ml   Output              100 ml   Net              821 ml      Physical Exam   Constitutional: He is  oriented to person, place, and time. He appears well-developed and well-nourished.   HENT:   Head: Normocephalic.   Nose: Nose normal.   Mouth/Throat: Oropharynx is clear and moist.   Eyes: Conjunctivae and EOM are normal.   Neck: Normal range of motion. No JVD present.   Cardiovascular: Normal rate, regular rhythm, normal heart sounds and intact distal pulses.    Pulmonary/Chest: Effort normal and breath sounds normal. No respiratory distress.   Abdominal: Soft. Bowel sounds are normal. He exhibits no distension.   Musculoskeletal: Normal range of motion.   Limited ROM to L hip s/p procedure  LUE AVG present with + thrill/+ bruit   Neurological: He is alert and oriented to person, place, and time.   Skin: Skin is warm and dry.   Bandage C/D/I to L hip   Psychiatric: He has a normal mood and affect. His behavior is normal. Thought content normal.       Significant Labs:   CBC:   Recent Labs  Lab 03/12/17 1930 03/13/17  0544   WBC 11.24 8.94   HGB 14.2 13.5*   HCT 42.4 41.3    193     CMP:   Recent Labs  Lab 03/12/17 1930 03/13/17  0544 03/13/17  1415    140  --    K 4.8 5.5* 4.6    98  --    CO2 25 25  --    GLU 90 99  --    BUN 61* 65*  --    CREATININE 7.9* 8.2*  --    CALCIUM 9.7 9.7  --    PROT  --  6.5  --    ALBUMIN  --  3.2*  --    BILITOT  --  0.6  --    ALKPHOS  --  106  --    AST  --  20  --    ALT  --  15  --    ANIONGAP 16 17*  --    EGFRNONAA 6* 6*  --        Significant Imaging:   Imaging Results         FL Greater Than 1 Hour (In process)         X-Ray Hip Intraoperative Left (Final result) Result time:  03/13/17 18:07:01    Final result by Taisha Vergara MD (03/13/17 18:07:01)    Impression:      As above.      Electronically signed by: TAISHA VERGARA MD  Date:     03/13/17  Time:    18:07     Narrative:    EXAM: XR HIP INTRAOPERATIVE LEFT    CLINICAL HISTORY:  Left femur intertrochanteric fracture    FINDINGS:  3 intraoperative fluoroscopic images.  73.3 seconds fluoroscopy  time.  Status post ORIF left femur fracture.            CT Head Without Contrast (Final result) Result time:  03/12/17 21:26:34    Final result by Amanda Hall MD (03/12/17 21:26:34)    Impression:        Stable head CT.        All CT scans at this facility use dose modulation, iterative reconstruction, and/or weight based dosing when appropriate to reduce radiation dose to as low as reasonably achievable.      Electronically signed by: AMANDA HALL  Date:     03/12/17  Time:    21:26     Narrative:    Exam: CT HEAD WITHOUT CONTRAST     Indication: Trauma.  Fall.  Injury to head.    Technique: Routine noncontrast head CT.    Comparison Study: 1/27/2017    Findings: No change.  No bleed.  Stable cerebral atrophy.  Stable ventricles.  Stable patchy deep white matter low attenuation characteristic of microangiopathy sequela.  No new abnormal density.  Gray-white differentiation preserved.  No mass.  Calvarium intact.  Sinuses and mastoids are well aerated.  No scalp swelling.            X-Ray Femur AP/LAT Left (Final result) Result time:  03/12/17 20:46:33    Final result by Amanda Hall MD (03/12/17 20:46:33)    Impression:        Acute intertrochanteric femoral neck fracture.      Electronically signed by: AMANDA HALL  Date:     03/12/17  Time:    20:46     Narrative:    Exam: Left femur x-ray 2 views    Indication: Trauma.  Injury to the left femur.    Findings: There is an acute intertrochanteric femoral neck fracture with varus angulation deformity.  Osteopenia.  Atherosclerosis.            X-Ray Pelvis Routine AP (Final result) Result time:  03/12/17 20:45:52    Procedure changed from X-Ray Hip 2 View Left        Final result by Amanda Hall MD (03/12/17 20:45:52)    Impression:        Acute left intertrochanteric femoral neck fracture with varus attenuation deformity.      Electronically signed by: AMANDA HALL  Date:     03/12/17  Time:    20:45     Narrative:    Exam: Pelvic x-ray single  view    Indication: Trauma.  Fall with injury to    Indication: Trauma.  Fall.  Injury to pelvis.    Findings: There is an acute left-sided intertrochanteric femoral neck fracture with varus angulation deformity.  Right hip replacement.  Pelvic ring intact.  Bones appear osteopenic.  Atherosclerosis, extensive.            X-Ray Chest 1 View (Final result) Result time:  03/12/17 20:48:47    Final result by Amanda Hall MD (03/12/17 20:48:47)    Impression:        Moderate cardiomegaly.  No acute process.      Electronically signed by: AMANDA HALL  Date:     03/12/17  Time:    20:48     Narrative:    Exam: Chest x-ray single view    Indication: Trauma.  Fall.    Findings: Comparison study 1/27/2017.  Moderate cardiomegaly.  Left chest wall single lead AICD.  Aortic calcification.  No significant vascular congestion.  Clear lungs.  Right subclavicular vascular stent.
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
## 2017-03-14 NOTE — ASSESSMENT & PLAN NOTE
-Pt admitted to Medical Surgical Unit  -Orthopedic Surgery consulted  -s/p repair today  -analgesia prn  -IV antibiotics

## 2017-03-14 NOTE — PLAN OF CARE
Problem: Patient Care Overview  Goal: Plan of Care Review  PT REQUIRES MAX A X 2 FOR T/F TO CHAIR.   Outcome: Ongoing (interventions implemented as appropriate)  Reviewed POC, including indications and possible side effects of administered medications. Patient verbalized understanding and teach back. Brother at bed side. Patient ambulated 4 feet in the room. Moved from bed to chair twice. Advised of turning every two hours.       12 hour chart check complete.

## 2017-03-14 NOTE — PROGRESS NOTES
Pod 1  C/o left thigh pain  AVSS  Dressing c/d/i  NV stable  Compartment soft  Plan:  1. ABX X 2 doses  2. PT WBAT  3. DVT prophylaxis  4. Wound care

## 2017-03-14 NOTE — ASSESSMENT & PLAN NOTE
-Pt admitted to Medical Surgical Unit  -Orthopedic Surgery consulted  -s/p repair today  -analgesia prn  -IV antibiotics  -PT/OT evaluation continued  -case management for discharge planning

## 2017-03-15 LAB
ALBUMIN SERPL BCP-MCNC: 2.7 G/DL
ALP SERPL-CCNC: 106 U/L
ALT SERPL W/O P-5'-P-CCNC: <5 U/L
ANION GAP SERPL CALC-SCNC: 14 MMOL/L
AST SERPL-CCNC: 19 U/L
BASOPHILS # BLD AUTO: 0.01 K/UL
BASOPHILS NFR BLD: 0.1 %
BILIRUB SERPL-MCNC: 0.6 MG/DL
BUN SERPL-MCNC: 60 MG/DL
CALCIUM SERPL-MCNC: 9.2 MG/DL
CHLORIDE SERPL-SCNC: 97 MMOL/L
CO2 SERPL-SCNC: 23 MMOL/L
CREAT SERPL-MCNC: 8.4 MG/DL
DIFFERENTIAL METHOD: ABNORMAL
EOSINOPHIL # BLD AUTO: 0.1 K/UL
EOSINOPHIL NFR BLD: 1.3 %
ERYTHROCYTE [DISTWIDTH] IN BLOOD BY AUTOMATED COUNT: 18.7 %
EST. GFR  (AFRICAN AMERICAN): 7 ML/MIN/1.73 M^2
EST. GFR  (NON AFRICAN AMERICAN): 6 ML/MIN/1.73 M^2
GLUCOSE SERPL-MCNC: 88 MG/DL
HCT VFR BLD AUTO: 36.1 %
HGB BLD-MCNC: 12 G/DL
LYMPHOCYTES # BLD AUTO: 0.6 K/UL
LYMPHOCYTES NFR BLD: 6.4 %
MCH RBC QN AUTO: 33.4 PG
MCHC RBC AUTO-ENTMCNC: 33.2 %
MCV RBC AUTO: 101 FL
MONOCYTES # BLD AUTO: 1.7 K/UL
MONOCYTES NFR BLD: 17.7 %
NEUTROPHILS # BLD AUTO: 7 K/UL
NEUTROPHILS NFR BLD: 74.5 %
PLATELET # BLD AUTO: 168 K/UL
PMV BLD AUTO: 10.1 FL
POTASSIUM SERPL-SCNC: 5.9 MMOL/L
PROT SERPL-MCNC: 6.4 G/DL
RBC # BLD AUTO: 3.59 M/UL
SODIUM SERPL-SCNC: 134 MMOL/L
WBC # BLD AUTO: 9.36 K/UL

## 2017-03-15 PROCEDURE — 21400001 HC TELEMETRY ROOM

## 2017-03-15 PROCEDURE — 36415 COLL VENOUS BLD VENIPUNCTURE: CPT

## 2017-03-15 PROCEDURE — 94761 N-INVAS EAR/PLS OXIMETRY MLT: CPT

## 2017-03-15 PROCEDURE — 25000003 PHARM REV CODE 250: Performed by: INTERNAL MEDICINE

## 2017-03-15 PROCEDURE — 85025 COMPLETE CBC W/AUTO DIFF WBC: CPT

## 2017-03-15 PROCEDURE — 25000003 PHARM REV CODE 250: Performed by: NURSE PRACTITIONER

## 2017-03-15 PROCEDURE — 97530 THERAPEUTIC ACTIVITIES: CPT

## 2017-03-15 PROCEDURE — 63600175 PHARM REV CODE 636 W HCPCS: Performed by: INTERNAL MEDICINE

## 2017-03-15 PROCEDURE — 99233 SBSQ HOSP IP/OBS HIGH 50: CPT | Mod: ,,, | Performed by: INTERNAL MEDICINE

## 2017-03-15 PROCEDURE — 97110 THERAPEUTIC EXERCISES: CPT

## 2017-03-15 PROCEDURE — 63600175 PHARM REV CODE 636 W HCPCS: Performed by: EMERGENCY MEDICINE

## 2017-03-15 PROCEDURE — 80100016 HC MAINTENANCE HEMODIALYSIS

## 2017-03-15 PROCEDURE — 80053 COMPREHEN METABOLIC PANEL: CPT

## 2017-03-15 PROCEDURE — 27000221 HC OXYGEN, UP TO 24 HOURS

## 2017-03-15 RX ORDER — CARVEDILOL 6.25 MG/1
6.25 TABLET ORAL 2 TIMES DAILY WITH MEALS
Status: DISCONTINUED | OUTPATIENT
Start: 2017-03-15 | End: 2017-03-16 | Stop reason: HOSPADM

## 2017-03-15 RX ADMIN — HYDROMORPHONE HYDROCHLORIDE 1 MG: 1 INJECTION, SOLUTION INTRAMUSCULAR; INTRAVENOUS; SUBCUTANEOUS at 08:03

## 2017-03-15 RX ADMIN — ISOSORBIDE MONONITRATE 30 MG: 30 TABLET, EXTENDED RELEASE ORAL at 09:03

## 2017-03-15 RX ADMIN — Medication 100 MG: at 09:03

## 2017-03-15 RX ADMIN — ASPIRIN 81 MG: 81 TABLET, COATED ORAL at 09:03

## 2017-03-15 RX ADMIN — SODIUM CHLORIDE, PRESERVATIVE FREE 3 ML: 5 INJECTION INTRAVENOUS at 05:03

## 2017-03-15 RX ADMIN — APIXABAN 2.5 MG: 2.5 TABLET, FILM COATED ORAL at 09:03

## 2017-03-15 RX ADMIN — CARVEDILOL 6.25 MG: 6.25 TABLET, FILM COATED ORAL at 04:03

## 2017-03-15 RX ADMIN — RANOLAZINE 500 MG: 500 TABLET, FILM COATED, EXTENDED RELEASE ORAL at 08:03

## 2017-03-15 RX ADMIN — APIXABAN 2.5 MG: 2.5 TABLET, FILM COATED ORAL at 08:03

## 2017-03-15 RX ADMIN — ONDANSETRON 8 MG: 8 TABLET, ORALLY DISINTEGRATING ORAL at 04:03

## 2017-03-15 RX ADMIN — PANTOPRAZOLE SODIUM 40 MG: 40 TABLET, DELAYED RELEASE ORAL at 08:03

## 2017-03-15 RX ADMIN — CLOPIDOGREL BISULFATE 75 MG: 75 TABLET ORAL at 08:03

## 2017-03-15 RX ADMIN — SODIUM CHLORIDE, PRESERVATIVE FREE 3 ML: 5 INJECTION INTRAVENOUS at 02:03

## 2017-03-15 RX ADMIN — ATORVASTATIN CALCIUM 40 MG: 40 TABLET, FILM COATED ORAL at 08:03

## 2017-03-15 RX ADMIN — AMIODARONE HYDROCHLORIDE 200 MG: 200 TABLET ORAL at 09:03

## 2017-03-15 RX ADMIN — SEVELAMER CARBONATE 800 MG: 800 TABLET, FILM COATED ORAL at 08:03

## 2017-03-15 RX ADMIN — SERTRALINE HYDROCHLORIDE 50 MG: 50 TABLET, FILM COATED ORAL at 09:03

## 2017-03-15 RX ADMIN — SODIUM CHLORIDE, PRESERVATIVE FREE 3 ML: 5 INJECTION INTRAVENOUS at 08:03

## 2017-03-15 RX ADMIN — PREDNISONE 5 MG: 5 TABLET ORAL at 09:03

## 2017-03-15 RX ADMIN — CARVEDILOL 12.5 MG: 12.5 TABLET, FILM COATED ORAL at 08:03

## 2017-03-15 NOTE — ASSESSMENT & PLAN NOTE
Underwent left ORIF by Dr. Pittman  -PT/OT evaluation continued  -case management for discharge planning  Plans for discharge to [facility] tomorrow 3/16/2017

## 2017-03-15 NOTE — SUBJECTIVE & OBJECTIVE
Interval History:    Pain to left hip is noted mostly with movement. Became hypotensive during HD and 100 ml of fluid was removed. Pt has some nausea during PT. Plans for discharge to [facility] tomorrow.     Review of Systems   Constitutional: Negative for activity change, chills and fever.   HENT: Negative for congestion, postnasal drip, sinus pressure, sneezing, sore throat and trouble swallowing.    Eyes: Negative for discharge, redness and visual disturbance.   Respiratory: Negative.  Negative for cough, chest tightness, shortness of breath and wheezing.    Cardiovascular: Negative.  Negative for chest pain, palpitations and leg swelling.   Gastrointestinal: Negative.  Negative for abdominal distention, abdominal pain, diarrhea, nausea and vomiting.   Endocrine: Negative.  Negative for cold intolerance and heat intolerance.   Genitourinary: Negative for difficulty urinating, dysuria, flank pain, frequency and urgency.   Musculoskeletal: Positive for arthralgias. Negative for back pain and myalgias.   Skin: Positive for wound. Negative for color change.   Allergic/Immunologic: Negative.  Negative for environmental allergies and food allergies.   Neurological: Negative.  Negative for dizziness, syncope, weakness and headaches.   Hematological: Negative.  Does not bruise/bleed easily.   Psychiatric/Behavioral: Negative.  Negative for agitation, confusion and sleep disturbance. The patient is not nervous/anxious.      Objective:     Vital Signs (Most Recent):  Temp: 97.8 °F (36.6 °C) (03/15/17 1658)  Pulse: 73 (03/15/17 1658)  Resp: 18 (03/15/17 1658)  BP: (!) 110/56 (03/15/17 1658)  SpO2: (!) 94 % (03/15/17 1658) Vital Signs (24h Range):  Temp:  [97.4 °F (36.3 °C)-98.5 °F (36.9 °C)] 97.8 °F (36.6 °C)  Pulse:  [66-89] 73  Resp:  [16-18] 18  SpO2:  [94 %-100 %] 94 %  BP: ()/(48-82) 110/56     Weight: 77.8 kg (171 lb 8.3 oz)  Body mass index is 24.61 kg/(m^2).    Intake/Output Summary (Last 24 hours) at  03/15/17 1812  Last data filed at 03/15/17 1438   Gross per 24 hour   Intake              953 ml   Output              618 ml   Net              335 ml      Physical Exam   Constitutional: He is oriented to person, place, and time. He appears well-developed and well-nourished.   HENT:   Head: Normocephalic.   Eyes: Conjunctivae and EOM are normal.   Neck: Normal range of motion. No JVD present.   Cardiovascular: Normal rate and regular rhythm.    Murmur heard.  Pulmonary/Chest: Effort normal and breath sounds normal. No respiratory distress.   Abdominal: Soft. Bowel sounds are normal. He exhibits no distension.   Musculoskeletal: Normal range of motion.   Limited ROM to L hip s/p procedure  LUE AVG present with + thrill/+ bruit   Neurological: He is alert and oriented to person, place, and time.   Skin: Skin is warm and dry.   Bandage C/D/I to L hip   Psychiatric: He has a normal mood and affect. His behavior is normal. Thought content normal.   Nursing note and vitals reviewed.      Significant Labs:   CBC:   Recent Labs  Lab 03/15/17  0536   WBC 9.36   HGB 12.0*   HCT 36.1*        CMP:   Recent Labs  Lab 03/15/17  0536   *   K 5.9*   CL 97   CO2 23   GLU 88   BUN 60*   CREATININE 8.4*   CALCIUM 9.2   PROT 6.4   ALBUMIN 2.7*   BILITOT 0.6   ALKPHOS 106   AST 19   ALT <5*   ANIONGAP 14   EGFRNONAA 6*     All pertinent labs within the past 24 hours have been reviewed.    Significant Imaging: I have reviewed all pertinent imaging results/findings within the past 24 hours.

## 2017-03-15 NOTE — PROGRESS NOTES
Pt completed 4 hours of HD tx with net UF of 100 ml.  Pt became hypertensive during tx and UF was discontinued.  Otherwise, tx went well, pt had no complaints.  Needles removed x2, pressures noted to be high with needle removal, hemostasis achieved. Dr. Alexander at bedside for portion of tx.

## 2017-03-15 NOTE — PLAN OF CARE
Problem: Patient Care Overview  Goal: Plan of Care Review  PT REQUIRES MAX A X 2 FOR T/F TO CHAIR.   Outcome: Ongoing (interventions implemented as appropriate)  PT REQUIRES MAX A X 2 FOR SIT>STAND

## 2017-03-15 NOTE — PLAN OF CARE
Problem: Patient Care Overview  Goal: Plan of Care Review  PT REQUIRES MAX A X 2 FOR T/F TO CHAIR.   Outcome: Ongoing (interventions implemented as appropriate)  PT REQUIRES MAX A X 2 FOR STANDING WITH RW

## 2017-03-15 NOTE — PLAN OF CARE
Problem: Patient Care Overview  Goal: Plan of Care Review  PT REQUIRES MAX A X 2 FOR T/F TO CHAIR.   Outcome: Ongoing (interventions implemented as appropriate)  Pt remains free from injury/falls. Fall precautions in place. Bed alarm on and functioning properly. Pt denies pain at present. Dressing to left lateral hip clean dry and intact. Pt had HD today and tolerated well. Pt tolerating renal diet. No acute distress noted. Bed in low, locked position, call light and person items within reach, bed rails up x2. Brother at bedside. VSS. Will cont to monitor.

## 2017-03-15 NOTE — PROGRESS NOTES
[facility] Hospital Medicine  Progress Note    Patient Name: Quinn Hutchins  MRN: [id number]  Patient Class: IP- Inpatient   Admission Date: 3/12/2017  Length of Stay: 3 days  Attending Physician: Fallon Garvey MD  Primary Care Provider: Jameel Almaguer MD        Subjective:     Principal Problem:Closed left hip fracture    HPI:  Mr. Quinn Hutchins is a 71 y.o. with a past medical history of ESRD on HD M/W/F via L UE fistula , atrial flutter, RCC , Ischemic cardiomyopathy with ICD , Gout, COPD  , HTN , HLD , JEROME on CPAP , chronic lower back pain here post fall. Today he states he was working outside states he lost his footing and fell on concrete. He denies lightheadedness, dizzy ness or weekness preceding fall. States he pretty clearly lost his footing. States he had some phototherapy to his arms and was told not to do anything strenuous for a few days. States he fell on left hip and had subsequent L hip pain. Couldn't bear weight , thus presented to the ER. Otherwise denies complaints.          Hospital Course:  Pt admitted to Medical Surgical Unit for closed hip fracture with Orthopedic Surgery consulted.  HD was resumed per Nephrology. completed today with 1.5 kg removed.  ORIF of left intertrochanteric hip fracture was performed rKiss Tucker.  Pt was noted with patches of dry skin and abrasions to upper extremities. He stated the skin to arms was in various stages of healing post acid wash per Dr. Bernard for treatment of recurrent skin cancer. Hgb has remained stable.     Interval History:    Pain to left hip is noted mostly with movement. Became hypotensive during HD and 100 ml of fluid was removed. Pt has some nausea during PT. Plans for discharge to [facility] tomorrow.     Review of Systems   Constitutional: Negative for activity change, chills and fever.   HENT: Negative for congestion, postnasal drip, sinus pressure, sneezing, sore throat and trouble swallowing.    Eyes: Negative for  discharge, redness and visual disturbance.   Respiratory: Negative.  Negative for cough, chest tightness, shortness of breath and wheezing.    Cardiovascular: Negative.  Negative for chest pain, palpitations and leg swelling.   Gastrointestinal: Negative.  Negative for abdominal distention, abdominal pain, diarrhea, nausea and vomiting.   Endocrine: Negative.  Negative for cold intolerance and heat intolerance.   Genitourinary: Negative for difficulty urinating, dysuria, flank pain, frequency and urgency.   Musculoskeletal: Positive for arthralgias. Negative for back pain and myalgias.   Skin: Positive for wound. Negative for color change.   Allergic/Immunologic: Negative.  Negative for environmental allergies and food allergies.   Neurological: Negative.  Negative for dizziness, syncope, weakness and headaches.   Hematological: Negative.  Does not bruise/bleed easily.   Psychiatric/Behavioral: Negative.  Negative for agitation, confusion and sleep disturbance. The patient is not nervous/anxious.      Objective:     Vital Signs (Most Recent):  Temp: 97.8 °F (36.6 °C) (03/15/17 1658)  Pulse: 73 (03/15/17 1658)  Resp: 18 (03/15/17 1658)  BP: (!) 110/56 (03/15/17 1658)  SpO2: (!) 94 % (03/15/17 1658) Vital Signs (24h Range):  Temp:  [97.4 °F (36.3 °C)-98.5 °F (36.9 °C)] 97.8 °F (36.6 °C)  Pulse:  [66-89] 73  Resp:  [16-18] 18  SpO2:  [94 %-100 %] 94 %  BP: ()/(48-82) 110/56     Weight: 77.8 kg (171 lb 8.3 oz)  Body mass index is 24.61 kg/(m^2).    Intake/Output Summary (Last 24 hours) at 03/15/17 1812  Last data filed at 03/15/17 1438   Gross per 24 hour   Intake              953 ml   Output              618 ml   Net              335 ml      Physical Exam   Constitutional: He is oriented to person, place, and time. He appears well-developed and well-nourished.   HENT:   Head: Normocephalic.   Eyes: Conjunctivae and EOM are normal.   Neck: Normal range of motion. No JVD present.   Cardiovascular: Normal rate and  regular rhythm.    Murmur heard.  Pulmonary/Chest: Effort normal and breath sounds normal. No respiratory distress.   Abdominal: Soft. Bowel sounds are normal. He exhibits no distension.   Musculoskeletal: Normal range of motion.   Limited ROM to L hip s/p procedure  LUE AVG present with + thrill/+ bruit   Neurological: He is alert and oriented to person, place, and time.   Skin: Skin is warm and dry.   Bandage C/D/I to L hip   Psychiatric: He has a normal mood and affect. His behavior is normal. Thought content normal.   Nursing note and vitals reviewed.      Significant Labs:   CBC:   Recent Labs  Lab 03/15/17  0536   WBC 9.36   HGB 12.0*   HCT 36.1*        CMP:   Recent Labs  Lab 03/15/17  0536   *   K 5.9*   CL 97   CO2 23   GLU 88   BUN 60*   CREATININE 8.4*   CALCIUM 9.2   PROT 6.4   ALBUMIN 2.7*   BILITOT 0.6   ALKPHOS 106   AST 19   ALT <5*   ANIONGAP 14   EGFRNONAA 6*     All pertinent labs within the past 24 hours have been reviewed.    Significant Imaging: I have reviewed all pertinent imaging results/findings within the past 24 hours.    Assessment/Plan:      * Closed left hip fracture  Underwent left ORIF by Dr. Pittman  -PT/OT evaluation continued  -case management for discharge planning  Plans for discharge to [facility] tomorrow 3/16/2017    Fall (on) (from) other stairs and steps, initial encounter  -will continue to monitor  -fall precautions   -PT/OT evaluation s/p procedure      Hypertension, renal  -home medications resumed      JEROME (obstructive sleep apnea)  -will continue to monitor        Paroxysmal atrial flutter  -Amiodarone continued    Current chronic use of systemic steroids  -secondary to COPD      VTE Risk Mitigation         Ordered     apixaban tablet 2.5 mg  2 times daily     Route:  Oral        03/14/17 2234     High Risk of VTE  Once      03/13/17 1800     Place sequential compression device  Until discontinued      03/12/17 2244     Place ROBERT hose  Until discontinued       03/12/17 2020          Reny Worley NP  Department of Hospital Medicine   [facility]

## 2017-03-15 NOTE — PT/OT/SLP PROGRESS
Occupational Therapy  Treatment    Quinn Hutchins   MRN: [id number]   Admitting Diagnosis: <principal problem not specified>    OT Date of Treatment: 03/15/17   OT Start Time: 154  OT Stop Time: 1605  OT Total Time (min): 23 min    Billable Minutes:  Therapeutic Activity x 23 min    General Precautions: Standard, fall  Orthopedic Precautions: LLE weight bearing as tolerated  Braces: N/A         Subjective:  Communicated with pt, family and nurse -Gisselle prior to session.      Pain Ratin/10  Location - Side: Left  Location - Orientation: upper  Location: hip  Pain Addressed: Reposition, Distraction, Pre-medicate for activity       Objective:  Patient found with: peripheral IV, oxygen     Functional Mobility:  Bed Mobility:  Rolling/Turning Right: Moderate assistance  Scooting/Bridging: Maximum Assistance  Supine to Sit: Moderate Assistance  Sit to Supine: Maximum Assistance    Transfers:   Sit <> Stand Assistance: Activity did not occur    Functional Ambulation:     Activities of Daily Living:     Feeding adaptive equipment:      UE adaptive equipment:      LE adaptive equipment:                     Bathing adaptive equipment:     Balance:   Static Sit: FAIR-: Maintains without assist but inconsistent   Dynamic Sit: FAIR: Cannot move trunk without losing balance  Static Stand:   Dynamic stand:     Therapeutic Activities and Exercises:  Pt sat edge of bed with BUE support, at times was able to maintain static sitting without UE at max for 1 min, exhibits anxiety and fear of falling, performed forward trunk exercise to help increase indep with fx transfers, bridging to head of bed with max A. Pt tolerated tx well, limited by pain, anxiety and fear of falling at this time, cooperative and motivated to get well and return home.     AM-PAC 6 CLICK ADL   How much help from another person does this patient currently need?   1 = Unable, Total/Dependent Assistance  2 = A lot, Maximum/Moderate Assistance  3 = A little,  Minimum/Contact Guard/Supervision  4 = None, Modified [place]/Independent          AM-PAC Raw Score CMS G-Code Modifier Level of Impairment Assistance   6 % Total / Unable   7 - 9 CM 80 - 100% Maximal Assist   10 - 14 CL 60 - 80% Moderate Assist   15 - 19 CK 40 - 60% Moderate Assist   20 - 22 CJ 20 - 40% Minimal Assist   23 CI 1-20% SBA / CGA   24 CH 0% Independent/ Mod I     Patient left supine with all lines intact, call button in reach and family present    ASSESSMENT:  Quinn Hutchins is a 71 y.o. male with a medical diagnosis of <principal problem not specified> and presents with impaired self care skills and fx mobility.    Rehab identified problem list/impairments: Rehab identified problem list/impairments: weakness, impaired endurance, impaired self care skills, gait instability, impaired functional mobilty, impaired balance, decreased coordination, decreased lower extremity function, decreased ROM, pain, orthopedic precautions    Rehab potential is good.    Activity tolerance: Good    Discharge recommendations: Discharge Facility/Level Of Care Needs: rehabilitation facility     Barriers to discharge: Barriers to Discharge: Decreased caregiver support    Equipment recommendations: bath bench     GOALS:   Occupational Therapy Goals        Problem: Occupational Therapy Goal    Goal Priority Disciplines Outcome Interventions   Occupational Therapy Goal     OT, PT/OT Ongoing (interventions implemented as appropriate)    Description:  OT GOALS TO BE MET BY 3-21-17  1. PT WILL TOLERATE 1 SET X 15 REPS B UE ROM EXERCISE WITH MIN RESISTANCE  2. MIN A WITH UE DRESSING  3. MIN A WITH LE DRESSING              Plan:  Patient to be seen 3 x/week to address the above listed problems via self-care/home management, therapeutic exercises, therapeutic activities  Plan of Care expires:    Plan of Care reviewed with: patient, family         Marixa Vines OT  03/15/2017

## 2017-03-15 NOTE — PT/OT/SLP PROGRESS
Physical Therapy  Treatment    Quinn Hutchins   MRN: [id number]   Admitting Diagnosis: <principal problem not specified>    PT Received On: 03/15/17  PT Start Time: 0800     PT Stop Time: 0823    PT Total Time (min): 23 min       Billable Minutes:  Therapeutic Activity 13 and Therapeutic Exercise 10    Treatment Type: Treatment  PT/PTA: PT             General Precautions: Standard, fall  Orthopedic Precautions: LLE weight bearing as tolerated   Braces:           Subjective:  Communicated with NURSE RAMIREZ AND EPIC CHART REVIEW  prior to session.   PT AGREED TO TX     Pain Ratin/10  Location - Side: Left     Location: hip     Pain Rating Post-Intervention: 6/10    Objective:   Patient found with: peripheral IV, oxygen    Functional Mobility:  Bed Mobility:   Rolling/Turning Right: Moderate assistance  Supine to Sit: Moderate Assistance    Transfers:  Sit <> Stand Assistance: Maximum Assistance (X2)  Sit <> Stand Assistive Device: Rolling Walker    Gait:   Gait Distance: PT ATTEMPTED SIDE STEPPING TO RIGHT WITH MAX A HOWEVER PT B LE BUCKLING.  Assistance 1: Maximum assistance  Gait Assistive Device: Rolling walker  Gait Deviation(s): decreased kiko, decreased weight-shifting ability    Balance:   Static Sit: GOOD-: Takes MODERATE challenges from all directions but inconsistently  Dynamic Sit: FAIR+: Maintains balance through MINIMAL excursions of active trunk motion  Static Stand: 0: Needs MAXIMAL assist to maintain   Dynamic stand: 0: N/A     Therapeutic Activities and Exercises:  PT SEATED EOB FOR B LE TE X 10 REPS OF AP, TKE WITH LIMITED ROM . PT STOOD X 3 TRIALS WITH MAX A X 2 AND ATTEMPTED SIDE STEPPING HOWEVER PT KNEES AND B UE BUCKLING. PT RETURNED SEATED AND LEFT SEATED EOB FOR SITTING TOLERANCE WITH BROTHER PRESENT AND NURSE AWARE.      AM-PAC 6 CLICK MOBILITY  How much help from another person does this patient currently need?   1 = Unable, Total/Dependent Assistance  2 = A lot, Maximum/Moderate  Assistance  3 = A little, Minimum/Contact Guard/Supervision  4 = None, Modified [place]/Independent         AM-PAC Raw Score CMS G-Code Modifier Level of Impairment Assistance   6 % Total / Unable   7 - 9 CM 80 - 100% Maximal Assist   10 - 14 CL 60 - 80% Moderate Assist   15 - 19 CK 40 - 60% Moderate Assist   20 - 22 CJ 20 - 40% Minimal Assist   23 CI 1-20% SBA / CGA   24 CH 0% Independent/ Mod I     Patient left SEATED EOB with call button in reach, NURSE ASHLEY AND BROTHER notified and BROTHER present.    Assessment:  PT MARBELLA TX WITH INC FATIGUE AND CONT REPORTS OF NOT FEELING STRONG.    Rehab identified problem list/impairments: Rehab identified problem list/impairments: weakness, impaired endurance, impaired self care skills, gait instability, impaired functional mobilty, impaired balance, decreased lower extremity function, pain, decreased ROM, decreased safety awareness    Rehab potential is good.    Activity tolerance: Fair    Discharge recommendations: Discharge Facility/Level Of Care Needs: rehabilitation facility     Barriers to discharge: Barriers to Discharge: Decreased caregiver support    Equipment recommendations:       GOALS:   Physical Therapy Goals        Problem: Physical Therapy Goal    Goal Priority Disciplines Outcome Goal Variances Interventions   Physical Therapy Goal     PT/OT, PT      Description:  PT WILL BE SEEN FOR P.T. FOR A MIN OF 5 OUT OF 7 DAYS A WEEK  LTG: 3/21/17  1. PT WILL COMPLETE BED MOBILITY WITH CGA.  2. PT WILL STAND WITH RW AND MOD A FOR GT AND T/F  3. PT WILL GT TRAIN WITH RW AND MOD A X 50'  4. PT WILL COMPLETE B LE TE X 20 REPS FOR ROM AND STRENGTHENING.               PLAN:    Patient to be seen    to address the above listed problems via gait training, therapeutic activities, therapeutic exercises  Plan of Care expires: 03/21/17  Plan of Care reviewed with: patient         Ijeoma Sprague, PT  03/15/2017

## 2017-03-15 NOTE — PROGRESS NOTES
GABRIELA followed up with Taty at [facility] regarding pt referral, and was notified that facility has submitted request for authorization to pt's insurance.  Taty expects to be granted authorization and be able to accept the pt tomorrow.  Taty will follow up with CM once they have decision regarding authorization.      Taty informed CM that authorization was received and pt will be accepted for rehab level of care tomorrow, 3/16 (post-op day 3).

## 2017-03-15 NOTE — ANESTHESIA POSTPROCEDURE EVALUATION
"Anesthesia Post Evaluation    Patient: Quinn Hutchins    Procedure(s) Performed: Procedure(s) (LRB):  IM NAILING OF FEMUR (Left)    Final Anesthesia Type: general  Patient location during evaluation: PACU  Patient participation: Yes- Able to Participate  Level of consciousness: awake and alert  Post-procedure vital signs: reviewed and stable  Pain management: adequate  Airway patency: patent  PONV status at discharge: No PONV  Anesthetic complications: no      Cardiovascular status: blood pressure returned to baseline  Respiratory status: unassisted  Hydration status: euvolemic  Follow-up not needed.        Visit Vitals    BP (!) 84/62 (BP Location: Right arm, Patient Position: Lying, BP Method: Automatic)    Pulse 89    Temp 36.4 °C (97.6 °F) (Oral)    Resp 18    Ht 5' 10" (1.778 m)    Wt 77.8 kg (171 lb 8.3 oz)    SpO2 99%    BMI 24.61 kg/m2       Pain/Mark Score: Pain Assessment Performed: Yes (3/15/2017  9:16 AM)  Presence of Pain: denies (3/15/2017  9:16 AM)  Pain Rating Prior to Med Admin: 6 (3/15/2017  8:21 AM)  Pain Rating Post Med Admin: 4 (3/14/2017  9:05 PM)      "

## 2017-03-15 NOTE — PLAN OF CARE
Problem: Patient Care Overview  Goal: Plan of Care Review  PT REQUIRES MAX A X 2 FOR T/F TO CHAIR.   Outcome: Ongoing (interventions implemented as appropriate)  Pt remained free of injury during shift, stable condition, pain adequately controlled with PRN IV medication and sleeping between care, fistula had present bruit/thrill, dressing changed per order, no acute distress, and will continue to monitor. 24hr chart review performed.

## 2017-03-15 NOTE — PROGRESS NOTES
"Renal f/u note:  Admit Date: 3/12/2017    Follow-up For:  ESRD on HD    Subjective:      Interval History: Pt was seen and examined. Chart was reviewed. Pt has no c/o's related to HD traetment.  Working with physical therapist, finds it "hard."  No CP, no SOB.    Objective:      Vital Signs:Blood pressure 98/61, pulse 70, temperature 97.6 °F (36.4 °C), resp. rate 16, height 5' 10" (1.778 m), weight 77.8 kg (171 lb 8.3 oz), SpO2 99 %.    Physical Exam   Constitutional: Patient is oriented to person, place, and time.   Cardiovascular: Normal rate and regular rhythm.    Pulmonary/Chest: Effort normal and clear to auscultation bilaterally. No respiratory distress.   Abdomen: Soft. Non-tender and non-distended. Bowel sounds are normal.   Skin: Warm and dry. No rashes.  Extremities: No edema.    Medications:   amiodarone  200 mg Oral Daily    apixaban  2.5 mg Oral BID    aspirin  81 mg Oral Daily    atorvastatin  40 mg Oral QHS    carvedilol  12.5 mg Oral BID WM    clopidogrel  75 mg Oral Daily    isosorbide mononitrate  30 mg Oral Daily    pantoprazole  40 mg Oral Daily    predniSONE  5 mg Oral Daily    ranolazine  500 mg Oral BID    sertraline  50 mg Oral Daily    sevelamer carbonate  800 mg Oral TID WM    sodium chloride 0.9%  3 mL Intravenous Q8H    thiamine  100 mg Oral Daily       Laboratory Data:  Reviewed and noted in plan where applicable. Please see chart for full laboratory data.    Lab Results   Component Value Date    WBC 9.36 03/15/2017    HGB 12.0 (L) 03/15/2017    HCT 36.1 (L) 03/15/2017     (H) 03/15/2017     03/15/2017         Recent Labs  Lab 03/15/17  0536   GLU 88   *   K 5.9*   CL 97   CO2 23   BUN 60*   CREATININE 8.4*   CALCIUM 9.2     Radiology:  Reviewed and noted in plan where applicable.  Please see chart for full radiology data.    Active Hospital Problems    Diagnosis  POA    Fall (on) (from) other stairs and steps, initial encounter [W10.8XXA]  Unknown    " Hyperkalemia [E87.5]  Unknown    Closed left hip fracture [S72.002A]  Yes    Current chronic use of systemic steroids [Z79.52]  Not Applicable     Chronic    Paroxysmal atrial flutter [I48.92]  Yes     Chronic    JEROME (obstructive sleep apnea) [G47.33]  Yes     Chronic    ESRD (end stage renal disease) [N18.6]  Yes     Chronic    Ischemic cardiomyopathy [I25.5]  Yes     Chronic    Mixed hyperlipidemia [E78.2]  Yes     Chronic    Hypertension, renal [I12.9]  Yes     Chronic      Resolved Hospital Problems    Diagnosis Date Resolved POA   No resolved problems to display.       A/P 72 y/o male with ESRD on chronic HD presented with left hip fx:      1. Renal: ESRD pt, chronic HD  q MWF HD treatment, continue HD  HD today. Tolerating HD well  Mild hyperkalemia: will correct with HD  Acid base normal     2. HTN: BP controlled to low/hypotension  Meds reviewed  Reduce coreg dose  Only on coreg and low dose isosorbide     3. Ortho: s/p ORIF for displaced left intertrochanteric hip fracture  Oer ortho, seems to be doing well post op.     Plans and recommendations:  As discussed above  Decrease coreg to 6.25 mg bid  HD today     Talia Alexander MD

## 2017-03-16 ENCOUNTER — TELEPHONE (OUTPATIENT)
Dept: CARDIOLOGY | Facility: CLINIC | Age: 72
End: 2017-03-16

## 2017-03-16 VITALS
OXYGEN SATURATION: 96 % | WEIGHT: 171.5 LBS | BODY MASS INDEX: 24.55 KG/M2 | DIASTOLIC BLOOD PRESSURE: 79 MMHG | SYSTOLIC BLOOD PRESSURE: 145 MMHG | TEMPERATURE: 98 F | HEIGHT: 70 IN | HEART RATE: 77 BPM | RESPIRATION RATE: 18 BRPM

## 2017-03-16 LAB
ALBUMIN SERPL BCP-MCNC: 2.6 G/DL
ALP SERPL-CCNC: 117 U/L
ALT SERPL W/O P-5'-P-CCNC: <5 U/L
ANION GAP SERPL CALC-SCNC: 13 MMOL/L
AST SERPL-CCNC: 22 U/L
BASOPHILS # BLD AUTO: 0.01 K/UL
BASOPHILS NFR BLD: 0.1 %
BILIRUB SERPL-MCNC: 0.7 MG/DL
BUN SERPL-MCNC: 32 MG/DL
CALCIUM SERPL-MCNC: 9.6 MG/DL
CHLORIDE SERPL-SCNC: 102 MMOL/L
CO2 SERPL-SCNC: 22 MMOL/L
CREAT SERPL-MCNC: 5.4 MG/DL
DIFFERENTIAL METHOD: ABNORMAL
EOSINOPHIL # BLD AUTO: 0.2 K/UL
EOSINOPHIL NFR BLD: 2.2 %
ERYTHROCYTE [DISTWIDTH] IN BLOOD BY AUTOMATED COUNT: 18.8 %
EST. GFR  (AFRICAN AMERICAN): 12 ML/MIN/1.73 M^2
EST. GFR  (NON AFRICAN AMERICAN): 10 ML/MIN/1.73 M^2
GLUCOSE SERPL-MCNC: 74 MG/DL
HCT VFR BLD AUTO: 35.7 %
HGB BLD-MCNC: 11.8 G/DL
LYMPHOCYTES # BLD AUTO: 0.6 K/UL
LYMPHOCYTES NFR BLD: 6.8 %
MCH RBC QN AUTO: 33.6 PG
MCHC RBC AUTO-ENTMCNC: 33.1 %
MCV RBC AUTO: 102 FL
MONOCYTES # BLD AUTO: 1.8 K/UL
MONOCYTES NFR BLD: 19.9 %
NEUTROPHILS # BLD AUTO: 6.3 K/UL
NEUTROPHILS NFR BLD: 71 %
PLATELET # BLD AUTO: 170 K/UL
PMV BLD AUTO: 10.3 FL
POTASSIUM SERPL-SCNC: 5.1 MMOL/L
PROT SERPL-MCNC: 6.4 G/DL
RBC # BLD AUTO: 3.51 M/UL
SODIUM SERPL-SCNC: 137 MMOL/L
WBC # BLD AUTO: 8.93 K/UL

## 2017-03-16 PROCEDURE — 97530 THERAPEUTIC ACTIVITIES: CPT

## 2017-03-16 PROCEDURE — 25000003 PHARM REV CODE 250: Performed by: INTERNAL MEDICINE

## 2017-03-16 PROCEDURE — 36415 COLL VENOUS BLD VENIPUNCTURE: CPT

## 2017-03-16 PROCEDURE — 99900035 HC TECH TIME PER 15 MIN (STAT)

## 2017-03-16 PROCEDURE — 63600175 PHARM REV CODE 636 W HCPCS: Performed by: INTERNAL MEDICINE

## 2017-03-16 PROCEDURE — 97110 THERAPEUTIC EXERCISES: CPT

## 2017-03-16 PROCEDURE — 99233 SBSQ HOSP IP/OBS HIGH 50: CPT | Mod: ,,, | Performed by: INTERNAL MEDICINE

## 2017-03-16 PROCEDURE — 85025 COMPLETE CBC W/AUTO DIFF WBC: CPT

## 2017-03-16 PROCEDURE — 80053 COMPREHEN METABOLIC PANEL: CPT

## 2017-03-16 PROCEDURE — 63600175 PHARM REV CODE 636 W HCPCS: Performed by: EMERGENCY MEDICINE

## 2017-03-16 PROCEDURE — 97116 GAIT TRAINING THERAPY: CPT

## 2017-03-16 PROCEDURE — 25000003 PHARM REV CODE 250: Performed by: NURSE PRACTITIONER

## 2017-03-16 RX ORDER — TRAMADOL HYDROCHLORIDE 50 MG/1
50 TABLET ORAL EVERY 6 HOURS PRN
Qty: 20 TABLET | Refills: 0 | Status: SHIPPED | OUTPATIENT
Start: 2017-03-16

## 2017-03-16 RX ADMIN — APIXABAN 2.5 MG: 2.5 TABLET, FILM COATED ORAL at 09:03

## 2017-03-16 RX ADMIN — Medication 100 MG: at 09:03

## 2017-03-16 RX ADMIN — SODIUM CHLORIDE, PRESERVATIVE FREE 3 ML: 5 INJECTION INTRAVENOUS at 05:03

## 2017-03-16 RX ADMIN — PREDNISONE 5 MG: 5 TABLET ORAL at 09:03

## 2017-03-16 RX ADMIN — AMIODARONE HYDROCHLORIDE 200 MG: 200 TABLET ORAL at 09:03

## 2017-03-16 RX ADMIN — HYDROMORPHONE HYDROCHLORIDE 1 MG: 1 INJECTION, SOLUTION INTRAMUSCULAR; INTRAVENOUS; SUBCUTANEOUS at 10:03

## 2017-03-16 RX ADMIN — HYDROMORPHONE HYDROCHLORIDE 1 MG: 1 INJECTION, SOLUTION INTRAMUSCULAR; INTRAVENOUS; SUBCUTANEOUS at 05:03

## 2017-03-16 RX ADMIN — SERTRALINE HYDROCHLORIDE 50 MG: 50 TABLET, FILM COATED ORAL at 09:03

## 2017-03-16 RX ADMIN — RANOLAZINE 500 MG: 500 TABLET, FILM COATED, EXTENDED RELEASE ORAL at 09:03

## 2017-03-16 RX ADMIN — PANTOPRAZOLE SODIUM 40 MG: 40 TABLET, DELAYED RELEASE ORAL at 09:03

## 2017-03-16 RX ADMIN — ISOSORBIDE MONONITRATE 30 MG: 30 TABLET, EXTENDED RELEASE ORAL at 09:03

## 2017-03-16 RX ADMIN — CARVEDILOL 6.25 MG: 6.25 TABLET, FILM COATED ORAL at 05:03

## 2017-03-16 RX ADMIN — CLOPIDOGREL BISULFATE 75 MG: 75 TABLET ORAL at 09:03

## 2017-03-16 RX ADMIN — CARVEDILOL 6.25 MG: 6.25 TABLET, FILM COATED ORAL at 09:03

## 2017-03-16 RX ADMIN — ASPIRIN 81 MG: 81 TABLET, COATED ORAL at 09:03

## 2017-03-16 RX ADMIN — SEVELAMER CARBONATE 800 MG: 800 TABLET, FILM COATED ORAL at 12:03

## 2017-03-16 RX ADMIN — SEVELAMER CARBONATE 800 MG: 800 TABLET, FILM COATED ORAL at 05:03

## 2017-03-16 RX ADMIN — SEVELAMER CARBONATE 800 MG: 800 TABLET, FILM COATED ORAL at 09:03

## 2017-03-16 NOTE — PROGRESS NOTES
"Renal f/u note:    Admit Date: 3/12/2017    Follow-up For:  ESRD    Subjective:      Interval History: Pt was seen and examined. No new issues, stable last pm.  No c/o's this am. No SOB. No new issues.  Discussed with SW.  Pt is leaving to go to Rehab in [place] and will dialyze at [facility] x 6 treatments as a treatment before going back to his own HD unit at [facility].    Objective:      Vital Signs: Blood pressure (!) 153/70, pulse 78, temperature 98.1 °F (36.7 °C), temperature source Oral, resp. rate 18, height 5' 10" (1.778 m), weight 77.8 kg (171 lb 8.3 oz), SpO2 96 %.    Physical Exam   Constitutional: Patient is oriented to person, place, and time.   Neck: No JVD  Cardiovascular: Normal rate and regular rhythm.  s1 and s2 audible  Pulmonary/Chest: Effort normal and clear to auscultation bilaterally. No respiratory distress.   Abdomen: Soft. Non-tender and non-distended. Bowel sounds are normal.   Neurological: Moves all extremities.  Skin: Warm and dry. No rashes.  Extremities: No edema.    Medications:   amiodarone  200 mg Oral Daily    apixaban  2.5 mg Oral BID    aspirin  81 mg Oral Daily    atorvastatin  40 mg Oral QHS    carvedilol  6.25 mg Oral BID WM    clopidogrel  75 mg Oral Daily    isosorbide mononitrate  30 mg Oral Daily    pantoprazole  40 mg Oral Daily    predniSONE  5 mg Oral Daily    ranolazine  500 mg Oral BID    sertraline  50 mg Oral Daily    sevelamer carbonate  800 mg Oral TID WM    sodium chloride 0.9%  3 mL Intravenous Q8H    thiamine  100 mg Oral Daily       Laboratory Data:  Reviewed and noted in plan where applicable. Please see chart for full laboratory data.    Lab Results   Component Value Date    WBC 8.93 03/16/2017    HGB 11.8 (L) 03/16/2017    HCT 35.7 (L) 03/16/2017     (H) 03/16/2017     03/16/2017         Recent Labs  Lab 03/16/17  0457   GLU 74      K 5.1      CO2 22*   BUN 32*   CREATININE 5.4*   CALCIUM 9.6 "     Radiology:  Reviewed and noted in plan where applicable.  Please see chart for full radiology data.    Active Hospital Problems    Diagnosis  POA    *Closed left hip fracture [S72.002A]  Yes    Fall (on) (from) other stairs and steps, initial encounter [W10.8XXA]  Unknown    Hyperkalemia [E87.5]  Unknown    Current chronic use of systemic steroids [Z79.52]  Not Applicable     Chronic    Paroxysmal atrial flutter [I48.92]  Yes     Chronic    JEROME (obstructive sleep apnea) [G47.33]  Yes     Chronic    ESRD (end stage renal disease) [N18.6]  Yes     Chronic    Ischemic cardiomyopathy [I25.5]  Yes     Chronic    Mixed hyperlipidemia [E78.2]  Yes     Chronic    Hypertension, renal [I12.9]  Yes     Chronic      Resolved Hospital Problems    Diagnosis Date Resolved POA   No resolved problems to display.       A/P 70 y/o male with ESRD on chronic HD presented with left hip fx:      1. Renal: ESRD pt, chronic HD  q MWF HD treatment, continue HD  Next HD tomorrow, if pt still in hospital      2. HTN: BP controlled now after reduction in coreg dose  Meds reviewed  Given h/o of frequent falls, better to have a slightly higher BP      3. Ortho: s/p ORIF for displaced left intertrochanteric hip fracture  Per ortho, doing well post op.      4. Disposition: soon to rehab in [place]  Will dialyze at [facility] x 6 treatments as a treatment before going back to his own HD unit at [facility].    Plans and recommendations:  As discussed above     Talia Alexander MD

## 2017-03-16 NOTE — DISCHARGE INSTRUCTIONS
Discharge Instructions for Hip Fracture Surgery  You had surgery to repair a hip fracture. The type of surgery you received depends on the location and severity of the fracture. You may have pins, screws, or rods (internal fixation devices) holding the fractured bone in place. Or, some or all of your hip may have been replaced. You must take care of your new hip as you recover at home or in a rehabilitation facility. This means moving and sitting the way you were taught in the hospital. You must also see your doctor for follow-up visits as you slowly return to activity.  Hip repair for fracture or hip replacement is major surgery. So dont be surprised if it takes a few months before you can move comfortably. Plan to have your family and friends help when you return home.  Home care  · Take your pain medicine exactly as directed.  · Dont drive until your doctor says its OK. And never drive if you are taking opioid pain medicine.  · Wear the support stockings you were given in the hospital. Wear them 24 hours a day for 3 to 4 week(s).  · Make arrangements to have your staples removed 2 weeks after surgery. The staples were used to close the skin incision.  · Get up and carefully move around to relieve pain.  · If you received an artificial hip joint, tell all your healthcare providers--including your dentist--about the joint before any procedure. You may need to take antibiotics before dental work and other medical procedures to reduce the risk of infection.  Incision care  · Avoid infection by washing your hands often. If an infection occurs, it will need to be treated with antibiotics immediately. So call your doctor right away if you think you may have an infection. Symptoms of infection include a fever or leakage of white, greenish, or yellowish-colored fluid from the incision.  · Check your incision daily for redness, tenderness, or drainage.  · Avoid soaking your wound in water (no hot tubs, bathtubs,  swimming pools) until your doctor says its OK.  · Wait 7 day(s) after your surgery to begin showering. Then shower as needed. Carefully wash your incision with soap and water. Gently pat it dry. Dont rub the incision, or apply creams or lotions. And sit on a shower stool when you shower to keep from falling.  Sitting and sleeping  · Dont sit for more than 30 to 45 minutes at a time.  · Use chairs with arms, and sit with your knees slightly lower than your hips. Dont sit on low or sagging chairs or couches.  · Dont lean forward while sitting.  · Dont cross your legs.  · Keep your feet flat on the floor. Dont turn your foot or leg inward. This stresses your hip joint.  · Use an elevated toilet seat for 6 week(s) after surgery.  · Use pillows between your legs when sleeping on your back or on your healthy side.  · Sit on a firm cushion when you ride in a car and avoid sitting too low. Try not to bend your hip too much when getting in and out of the car.  Moving safely  · Dont bend at the hip when you bend over. Dont bend at the waist to put on socks and shoes. And avoid picking up items from the floor.  · Use a cane, crutches, a walker, or handrails until your balance, flexibility, and strength improve. And remember to ask for help from others when you need it.  · Free up your hands so that you can use them to keep balance. Use a craig pack, apron, or pockets to carry things.  · Follow your doctors orders regarding how much weight to place on the affected leg.  · Do all exercises as instructed.  · Arrange your household to keep the items you need within reach.  · Remove electrical cords, throw rugs, and anything else that may cause you to fall.  · Use nonslip bath mats, grab bars, an elevated toilet seat, and a shower chair in your bathroom.  Follow-up  Make a follow-up appointment as directed by your doctor.     When to seek medical attention  Call 911 right away if you have any of the following:  · Chest  pain  · Shortness of breath  Otherwise, call your doctor right away if you have any of the following:  · Increased hip pain  · Pain or swelling of your calf or leg  · Fever above 100.4°F  (38.0°C) or shaking chills  · Excessive swelling, increased redness, or any drainage from the incision  · Swelling, tenderness, or cramps in your leg   Date Last Reviewed: 11/15/2015  © 4406-4638 [employer]. [address] [place] [address], [place] [address]. All rights reserved. This information is not intended as a substitute for professional medical care. Always follow your healthcare professional's instructions.

## 2017-03-16 NOTE — PT/OT/SLP PROGRESS
Physical Therapy  Treatment    Quinn Hutchins   MRN: [id number]   Admitting Diagnosis: Closed left hip fracture    PT Received On: 17  PT Start Time: 931     PT Stop Time: 955    PT Total Time (min): 24 min       Billable Minutes:  Gait [id number] and Therapeutic Exercise 10    Treatment Type: Treatment  PT/PTA: PT             General Precautions: Standard, fall  Orthopedic Precautions: LLE weight bearing as tolerated   Braces:           Subjective:  Communicated with NURSE VASQUEZ AND EPIC CHART REVIEW  prior to session.   PT AGREE TX     Pain Ratin/10  Location - Side: Left     Location: leg     Pain Rating Post-Intervention: 6/10 (AFTER GT )    Objective:   Patient found with: peripheral IV, oxygen    Functional Mobility:  Bed Mobility:   Rolling/Turning Right: Minimum assistance  Supine to Sit: Minimum Assistance    Transfers:  Sit <> Stand Assistance: Maximum Assistance  Sit <> Stand Assistive Device: Rolling Walker  Bed <> Chair Technique: Stand Pivot  Bed <> Chair Assistance: Maximum Assistance  Bed <> Chair Assistive Device: Rolling Walker    Gait:   Gait Distance: PT GT TRAINED X 5' WITH RW AND CHAIR IN TOW WITH STEP TO GT AND DEC LE CLEARANCE FROM FLOOR  Assistance 1: Maximum assistance  Gait Assistive Device: Rolling walker  Gait Pattern: swing-to gait  Gait Deviation(s): decreased kiko, decreased weight-shifting ability    Balance:   Static Sit: GOOD: Takes MODERATE challenges from all directions  Dynamic Sit: GOOD-: Maintains balance through MODERATE excursions of active trunk movement,     Static Stand: 0: Needs MAXIMAL assist to maintain   Dynamic stand: POOR: N/A     Therapeutic Activities and Exercises:  PT SEATED EOB FOR AP , TKE. PT STOOD FOR GT X 5' WITH MAX A AND CHAIR IN TOW. PT STOOD X 2ND TRIAL WITH MAX >MOD A. PT LEFT SEATED IN CHAIR WITH ALL NEEDS MET      AM-PAC 6 CLICK MOBILITY  How much help from another person does this patient currently need?   1 = Unable, Total/Dependent  Assistance  2 = A lot, Maximum/Moderate Assistance  3 = A little, Minimum/Contact Guard/Supervision  4 = None, Modified [place]/Independent         AM-PAC Raw Score CMS G-Code Modifier Level of Impairment Assistance   6 % Total / Unable   7 - 9 CM 80 - 100% Maximal Assist   10 - 14 CL 60 - 80% Moderate Assist   15 - 19 CK 40 - 60% Moderate Assist   20 - 22 CJ 20 - 40% Minimal Assist   23 CI 1-20% SBA / CGA   24 CH 0% Independent/ Mod I     Patient left up in chair with call button in reach.    Assessment:  PT PROGRESSING WITH GT TRAINING TODAY. PT CONT TO NEED P.T. TO ASSIST IN RETURNING TO PLOF    Rehab identified problem list/impairments: Rehab identified problem list/impairments: weakness, impaired endurance, impaired functional mobilty, impaired balance, gait instability, impaired self care skills, decreased lower extremity function, pain, decreased ROM    Rehab potential is good.    Activity tolerance: Fair    Discharge recommendations: Discharge Facility/Level Of Care Needs: rehabilitation facility     Barriers to discharge: Barriers to Discharge: Decreased caregiver support    Equipment recommendations:       GOALS:   Physical Therapy Goals        Problem: Physical Therapy Goal    Goal Priority Disciplines Outcome Goal Variances Interventions   Physical Therapy Goal     PT/OT, PT      Description:  PT WILL BE SEEN FOR P.T. FOR A MIN OF 5 OUT OF 7 DAYS A WEEK  LTG: 3/21/17  1. PT WILL COMPLETE BED MOBILITY WITH CGA.  2. PT WILL STAND WITH RW AND MOD A FOR GT AND T/F  3. PT WILL GT TRAIN WITH RW AND MOD A X 50'  4. PT WILL COMPLETE B LE TE X 20 REPS FOR ROM AND STRENGTHENING.               PLAN:    Patient to be seen    to address the above listed problems via gait training, therapeutic activities, therapeutic exercises  Plan of Care expires: 03/21/17  Plan of Care reviewed with: patient         Ijeoma Sprague, PT  03/16/2017

## 2017-03-16 NOTE — PLAN OF CARE
Problem: Patient Care Overview  Goal: Plan of Care Review  PT REQUIRES MAX A X 2 FOR T/F TO CHAIR.   Outcome: Ongoing (interventions implemented as appropriate)  PT REQUIRES MAX A FOR GT X 5' WITH CHAIR IN TOW FOR SAFETY.

## 2017-03-16 NOTE — DISCHARGE SUMMARY
[facility] Hospital Medicine  Discharge Summary      Patient Name: Quinn Hutchins  MRN: [id number]  Admission Date: 3/12/2017  Hospital Length of Stay: 4 days  Discharge Date and Time:  03/16/2017 3:48 PM  Attending Physician: Fallon Garvey MD   Discharging Provider: Reny Worley NP  Primary Care Provider: Jameel Almaguer MD      HPI:   Mr. Quinn Hutchins is a 71 y.o. with a past medical history of ESRD on HD M/W/F via L UE fistula , atrial flutter, RCC , Ischemic cardiomyopathy with ICD , Gout, COPD  , HTN , HLD , JEROME on CPAP , chronic lower back pain here post fall. Today he stated he was working outside states he lost his footing and fell on concrete. He denied lightheadedness, dizzy ness or weekness preceding fall. Stated he pretty clearly lost his footing. States he had some phototherapy to his arms and was told not to do anything strenuous for a few days. States he fell on left hip and had subsequent L hip pain. Couldn't bear weight , thus presented to the ER. Otherwise denies complaints.          Procedure(s) (LRB):  IM NAILING OF FEMUR (Left)      Indwelling Lines/Drains at time of discharge:   Lines/Drains/Airways          No matching active lines, drains, or airways        Hospital Course:   Pt admitted to Medical Surgical Unit for closed hip fracture with Orthopedic Surgery consulted.  HD was resumed per Nephrology.  ORIF of left intertrochanteric hip fracture was performed Kriss Tucker.  Pt was noted with patches of dry skin and abrasions to upper extremities. He stated the skin to arms was in various stages of healing post acid wash per Dr. Bernard for treatment of recurrent skin cancer. Hgb has remained stable. Pain decreased slightly and pt tolerated PT/OT. Hgb remained stable. Eliquis 2.5 mg twice daily x 35 days was prescribed for DVT prophylaxis. After a 4 day course, pt was stable for discharge to [facility] facility. He will have dialysis in [place] during stay.  He was seen and examined and determined to be stable and safe for discharge.      Consults:   Consults         Status Ordering Provider     Consult to Orthopedic Surgery  Once     Provider:  (Not yet assigned)    Acknowledged HARLEY STANFORD     Inpatient consult to Cardiology  Once     Provider:  (Not yet assigned)    Completed HARLEY STANFORD     Inpatient consult to Nephrology  Once     Provider:  Doc Lerma MD    Completed ADELITA VELASCO     Inpatient consult to Nephrology  Once     Provider:  (Not yet assigned)    Completed HARLEY STANFORD     Inpatient consult to Orthopedic Surgery  Once     Provider:  Manuel Pittman MD    Completed ADELITA VELASCO          Significant Diagnostic Studies:   Imaging Results         FL Greater Than 1 Hour (Final result) Result time:  03/14/17 15:13:30    Final result by Masoud Montaño Results In (03/14/17 15:13:30)    Impression:     Please see above.      Electronically signed by: VIRTUAL RADIOLOGIST  Date:     03/14/17  Time:    15:13     Narrative:    Fluoroscopy used for procedure.  Please see physician notes for details.            X-Ray Hip Intraoperative Left (Final result) Result time:  03/13/17 18:07:01    Final result by Taisha Vergara MD (03/13/17 18:07:01)    Impression:      As above.      Electronically signed by: TAISHA VERGARA MD  Date:     03/13/17  Time:    18:07     Narrative:    EXAM: XR HIP INTRAOPERATIVE LEFT    CLINICAL HISTORY:  Left femur intertrochanteric fracture    FINDINGS:  3 intraoperative fluoroscopic images.  73.3 seconds fluoroscopy time.  Status post ORIF left femur fracture.            CT Head Without Contrast (Final result) Result time:  03/12/17 21:26:34    Final result by Aj Hall MD (03/12/17 21:26:34)    Impression:        Stable head CT.        All CT scans at this facility use dose modulation, iterative reconstruction, and/or weight based dosing when appropriate to reduce radiation dose to as low as reasonably  achievable.      Electronically signed by: AMANDA HALL  Date:     03/12/17  Time:    21:26     Narrative:    Exam: CT HEAD WITHOUT CONTRAST     Indication: Trauma.  Fall.  Injury to head.    Technique: Routine noncontrast head CT.    Comparison Study: 1/27/2017    Findings: No change.  No bleed.  Stable cerebral atrophy.  Stable ventricles.  Stable patchy deep white matter low attenuation characteristic of microangiopathy sequela.  No new abnormal density.  Gray-white differentiation preserved.  No mass.  Calvarium intact.  Sinuses and mastoids are well aerated.  No scalp swelling.            X-Ray Femur AP/LAT Left (Final result) Result time:  03/12/17 20:46:33    Final result by Amanda Hall MD (03/12/17 20:46:33)    Impression:        Acute intertrochanteric femoral neck fracture.      Electronically signed by: AMANDA HALL  Date:     03/12/17  Time:    20:46     Narrative:    Exam: Left femur x-ray 2 views    Indication: Trauma.  Injury to the left femur.    Findings: There is an acute intertrochanteric femoral neck fracture with varus angulation deformity.  Osteopenia.  Atherosclerosis.            X-Ray Pelvis Routine AP (Final result) Result time:  03/12/17 20:45:52    Procedure changed from X-Ray Hip 2 View Left        Final result by Amanda Hall MD (03/12/17 20:45:52)    Impression:        Acute left intertrochanteric femoral neck fracture with varus attenuation deformity.      Electronically signed by: AMANDA HALL  Date:     03/12/17  Time:    20:45     Narrative:    Exam: Pelvic x-ray single view    Indication: Trauma.  Fall with injury to    Indication: Trauma.  Fall.  Injury to pelvis.    Findings: There is an acute left-sided intertrochanteric femoral neck fracture with varus angulation deformity.  Right hip replacement.  Pelvic ring intact.  Bones appear osteopenic.  Atherosclerosis, extensive.            X-Ray Chest 1 View (Final result) Result time:  03/12/17 20:48:47    Final result by Amanda  MOHINDER Hall MD (03/12/17 20:48:47)    Impression:        Moderate cardiomegaly.  No acute process.      Electronically signed by: AMANDA HALL  Date:     03/12/17  Time:    20:48     Narrative:    Exam: Chest x-ray single view    Indication: Trauma.  Fall.    Findings: Comparison study 1/27/2017.  Moderate cardiomegaly.  Left chest wall single lead AICD.  Aortic calcification.  No significant vascular congestion.  Clear lungs.  Right subclavicular vascular stent.              Pending Diagnostic Studies:     None        Final Active Diagnoses:    Diagnosis Date Noted POA    PRINCIPAL PROBLEM:  Closed left hip fracture [S72.002A] 03/12/2017 Yes    Fall (on) (from) other stairs and steps, initial encounter [W10.8XXA]  Unknown    ESRD (end stage renal disease) [N18.6] 03/13/2014 Yes     Chronic    Current chronic use of systemic steroids [Z79.52] 10/05/2016 Not Applicable     Chronic    Paroxysmal atrial flutter [I48.92] 05/12/2016 Yes     Chronic    JEROME (obstructive sleep apnea) [G47.33] 04/12/2016 Yes     Chronic    Ischemic cardiomyopathy [I25.5] 08/20/2013 Yes     Chronic    Mixed hyperlipidemia [E78.2] 08/20/2013 Yes     Chronic    Hypertension, renal [I12.9] 08/16/2012 Yes     Chronic      Problems Resolved During this Admission:    Diagnosis Date Noted Date Resolved POA    Hyperkalemia [E87.5]  03/16/2017 Unknown      No new Assessment & Plan notes have been filed under this hospital service since the last note was generated.  Service: Hospital Medicine      Discharged Condition: stable    Disposition: Rehab Facility    Follow Up:  Follow-up Information     Follow up with Darron Betts MD In 3 weeks.    Specialty:  Orthopedic Surgery    Why:  Hosp F/U - Left hip fracture    Contact information:    [address] [place] [address] DR Duyen [place] [address] [phone]          Patient Instructions:     Diet general   Order Specific Question Answer Comments   Additional restrictions: Renal      Activity as tolerated        Medications:  Reconciled Home Medications:   Current Discharge Medication List      START taking these medications    Details   apixaban 2.5 mg Tab Take 1 tablet (2.5 mg total) by mouth 2 (two) times daily.  Qty: 66 tablet, Refills: 0         CONTINUE these medications which have CHANGED    Details   tramadol (ULTRAM) 50 mg tablet Take 1 tablet (50 mg total) by mouth every 6 (six) hours as needed for Pain.  Qty: 20 tablet, Refills: 0         CONTINUE these medications which have NOT CHANGED    Details   amiodarone (PACERONE) 400 MG tablet Take 1 tablet (400 mg total) by mouth 2 (two) times daily.  Qty: 60 tablet, Refills: 11      carvedilol (COREG) 25 MG tablet Take 0.5 tablets (12.5 mg total) by mouth 2 (two) times daily with meals.  Qty: 60 tablet, Refills: 11    Associated Diagnoses: Chronic ischemic heart disease; Chronic systolic congestive heart failure      cloNIDine (CATAPRES) 0.3 MG tablet Take 0.3 mg by mouth as needed.      clopidogrel (PLAVIX) 75 mg tablet Take 75 mg by mouth once daily.      docusate sodium (COLACE) 100 MG capsule Take 1 capsule (100 mg total) by mouth 3 (three) times daily as needed for Constipation.  Refills: 0      acetaminophen (TYLENOL) 500 MG tablet Take 500 mg by mouth every 6 (six) hours as needed for Pain.      acitretin (SORIATANE) 10 MG capsule Take 10 mg by mouth every evening.   Refills: 5      aspirin (ECOTRIN) 81 MG EC tablet Take 81 mg by mouth once daily.      atorvastatin (LIPITOR) 40 MG tablet TAKE 1 TABLET BY MOUTH EVERY EVENING  Qty: 30 tablet, Refills: 3      isosorbide mononitrate (IMDUR) 30 MG 24 hr tablet Take 1 tablet (30 mg total) by mouth once daily.  Qty: 30 tablet, Refills: 11    Associated Diagnoses: Essential hypertension      loratadine (CLARITIN) 10 mg tablet Take 10 mg by mouth once daily.      mupirocin (BACTROBAN) 2 % ointment Apply to wound infection 2 times a week for 7 days  Qty: 22 g, Refills: 0    Associated Diagnoses: Cellulitis of left  lower extremity      nitroGLYCERIN (NITROSTAT) 0.4 MG SL tablet Place 1 tablet (0.4 mg total) under the tongue every 5 (five) minutes as needed for Chest pain.  Qty: 30 tablet, Refills: 0      ondansetron (ZOFRAN-ODT) 4 MG TbDL Take 2 tablets (8 mg total) by mouth every 8 (eight) hours as needed.  Qty: 20 tablet, Refills: 0      pantoprazole (PROTONIX) 40 MG tablet Take 1 tablet (40 mg total) by mouth once daily.  Qty: 30 tablet, Refills: 11    Associated Diagnoses: Gastroesophageal reflux disease without esophagitis      predniSONE (DELTASONE) 5 MG tablet TAKE 1 TABLET(S) ORAL (BY MOUTH) EVERY DAY  Qty: 100 tablet, Refills: 1      pyridoxine (VITAMIN B-6) 200 mg Tab Take 1 tablet by mouth every evening.       RANEXA 500 mg Tb12 TAKE 1 TABLET TWICE A DAY  Qty: 60 tablet, Refills: 12      sertraline (ZOLOFT) 100 MG tablet Take 50 mg by mouth once daily.       sevelamer carbonate (RENVELA) 800 mg Tab Take 1 tablet (800 mg total) by mouth 3 (three) times daily with meals.  Qty: 240 tablet, Refills: 1      thiamine 100 MG tablet Take 1 tablet (100 mg total) by mouth once daily.  Qty: 30 tablet, Refills: 11         STOP taking these medications       methylPREDNISolone (MEDROL, LIANNE,) 4 mg tablet Comments:   Reason for Stopping:             Time spent on the discharge of patient: > 30 minutes    Reny Worley NP  Department of Hospital Medicine  [facility]

## 2017-03-16 NOTE — TELEPHONE ENCOUNTER
----- Message from Dorcas Dixon sent at 3/16/2017 12:48 PM CDT -----  Contact: Kev Conroy  States a clearance is needed asap, can be reached at [phone]///thxMW

## 2017-03-16 NOTE — PLAN OF CARE
Problem: Patient Care Overview  Goal: Plan of Care Review  PT REQUIRES MAX A X 2 FOR T/F TO CHAIR.   Outcome: Ongoing (interventions implemented as appropriate)  Pt remained free of injury during shift, stable condition, pain adequately controlled with PRN IV medication and sleeping between care, fistula had present bruit/thrill, received dialysis during day shift, dressing changed per order, no acute distress, and will continue to monitor. 24hr chart review performed.

## 2017-03-16 NOTE — PLAN OF CARE
CM faxed d/c orders to facility and got confirmation that they were received.  Taty at [facility] followed up with CM to confirm they will accept pt today for rehab LOC.  Pt  time will be given, pending bedside nurse calling facility and giving report.  CM notified pt's family about discharge today.  Pt expected to discharge with no further CM needs.       03/16/17 1537   Final Note   Assessment Type Final Discharge Note   Discharge Disposition Rehab   Discharge planning education complete? Yes   What phone number can be called within the next 1-3 days to see how you are doing after discharge? [phone]   Discharge/Hospital Encounter Summary to ([facility]) PCP n/a   Referral to Outpatient Case Management complete? n/a   Referral to / orders for Home Health Complete? n/a   30 day supply of medicines given at discharge, if documented non-compliance / non-adherence? n/a   Any social issues identified prior to discharge? No   Did you assess the readiness or willingness of the family or caregiver to support self management of care? Yes   Right Care Referral Info   Post Acute Recommendation IRF   Referral Type Rehab   Facility Name [facility]

## 2017-03-16 NOTE — PROGRESS NOTES
GABRIELA spoke with Taty at [facility], who confirmed pt can be admitted to facility today pending discharge documentation being sent,  time being coordinated, and dialysis chair time during facility stay being finalized.

## 2017-03-16 NOTE — NURSING
Discharge instructions reviewed with patient. Report called into Shari at [facility]. Prescription for Tramadol 50mg given to patient. Awaiting for transportation service. Shari will call with a time when  is leaving.

## 2017-03-16 NOTE — PROGRESS NOTES
POD3  S/p L ORIF  C/o mild pain  AVSS  NVID  Dressing c/d/i  Xray WNL  Plan:  WBAT  PT  DVT prophylaxis  wound care  Clear for transfer  F/u Dr. Dumont 10 days

## 2017-03-16 NOTE — PT/OT/SLP PROGRESS
Physical Therapy  Treatment    Quinn Hutchins   MRN: [id number]   Admitting Diagnosis: Closed left hip fracture    PT Received On: 17  PT Start Time: 1420     PT Stop Time: 1440    PT Total Time (min): 20 min       Billable Minutes:  Therapeutic Activity 20    Treatment Type: Treatment  PT/PTA: PT             General Precautions: Standard, fall  Orthopedic Precautions: LLE weight bearing as tolerated   Braces:           Subjective:  Communicated with nurse VASQUEZ AND EPIC CHART REVIEW  prior to session.  PT AGREED TO TX. PT REPORTED AWAITING D/C TO NEGRITO.     Pain Ratin/10  Location - Side: Left     Location: hip     Pain Rating Post-Intervention: 5/10    Objective:   Patient found with: peripheral IV, oxygen    Functional Mobility:  Bed Mobility:   Rolling/Turning Right: Minimum assistance  Supine to Sit: Minimum Assistance  Sit to Supine: Moderate Assistance    Transfers:  Sit <> Stand Assistance: Moderate Assistance  Sit <> Stand Assistive Device: Rolling Walker  Bed <> Chair Technique: Stand Pivot  Bed <> Chair Assistance: Moderate Assistance  Bed <> Chair Assistive Device: Rolling Walker    Gait:   Gait Distance: PT GT TRAINED X 5' WITH RW AND CHAIR IN TOW WITH STEP TO GT AND DEC LE CLEARANCE FROM FLOOR  Assistance 1: Maximum assistance  Gait Assistive Device: Rolling walker  Gait Pattern: swing-to gait  Gait Deviation(s): decreased kiko, decreased weight-shifting ability      Balance:   Static Sit: GOOD-: Takes MODERATE challenges from all directions but inconsistently  Dynamic Sit: FAIR+: Maintains balance through MINIMAL excursions of active trunk motion  Static Stand: POOR: Needs MODERATE assist to maintain  Dynamic stand: POOR: N/A     Therapeutic Activities and Exercises:  PT STOOD FROM CHAIR WITH RW AND MOD A WITH POST LEAN. PT GIVEN CUES FOR ANT WT SHIFT FOR T/F TO BED WITH MOD A. PT SEATED EOB AND SCOOTED TO HOB WITH MIN A. PT SUP IN BED WITH MOD A. PT LEFT WITH SCD'S ON AND ALL NEEDS MET       AM-PAC 6 CLICK MOBILITY  How much help from another person does this patient currently need?   1 = Unable, Total/Dependent Assistance  2 = A lot, Maximum/Moderate Assistance  3 = A little, Minimum/Contact Guard/Supervision  4 = None, Modified [place]/Independent         AM-PAC Raw Score CMS G-Code Modifier Level of Impairment Assistance   6 % Total / Unable   7 - 9 CM 80 - 100% Maximal Assist   10 - 14 CL 60 - 80% Moderate Assist   15 - 19 CK 40 - 60% Moderate Assist   20 - 22 CJ 20 - 40% Minimal Assist   23 CI 1-20% SBA / CGA   24 CH 0% Independent/ Mod I     Patient left supine with all lines intact, call button in reach and BROTHER present.    Assessment:  PT PROGRESSING WITH T/F TRAINING. PT REQUIRED LESS ASSIST FOR T/F TO CHAIR.    Rehab identified problem list/impairments: Rehab identified problem list/impairments: weakness, impaired functional mobilty, impaired balance, gait instability, impaired self care skills, impaired endurance, decreased lower extremity function, pain, decreased ROM    Rehab potential is good.    Activity tolerance: Good    Discharge recommendations: Discharge Facility/Level Of Care Needs: rehabilitation facility     Barriers to discharge: Barriers to Discharge: Decreased caregiver support    Equipment recommendations:       GOALS:   Physical Therapy Goals     Not on file      Multidisciplinary Problems (Resolved)        Problem: Physical Therapy Goal    Goal Priority Disciplines Outcome Goal Variances Interventions   Physical Therapy Goal   (Resolved)     PT/OT, PT Outcome(s) achieved     Description:  PT WILL BE SEEN FOR P.T. FOR A MIN OF 5 OUT OF 7 DAYS A WEEK  LTG: 3/21/17  1. PT WILL COMPLETE BED MOBILITY WITH CGA.  2. PT WILL STAND WITH RW AND MOD A FOR GT AND T/F  3. PT WILL GT TRAIN WITH RW AND MOD A X 50'  4. PT WILL COMPLETE B LE TE X 20 REPS FOR ROM AND STRENGTHENING.               PLAN:    Patient to be seen    to address the above listed problems via gait  training, therapeutic activities, therapeutic exercises  Plan of Care expires: 03/21/17  Plan of Care reviewed with: patient, family         Ijeoma Sprague, PT  03/16/2017

## 2017-03-20 NOTE — TELEPHONE ENCOUNTER
----- Message from Paige Acevedo sent at 3/20/2017 12:23 PM CDT -----  Contact: Curt - Dr Rafi Hartmann  She has been trying to get a clearance on this patient for three weeks.  She states that she spoke to the nurse, but she still has not gotten it.  They are trying to schedule surgery for the patient and needs the clearance faxed to them at [phone] and phone [phone].                               manjarrez

## 2017-03-20 NOTE — TELEPHONE ENCOUNTER
Patient needs tooth extraction and needs clearance to hold Eliquis/Plavix/ASA. Can patient hold? If so, how long? Please advise?

## 2017-03-27 ENCOUNTER — PATIENT MESSAGE (OUTPATIENT)
Dept: NEPHROLOGY | Facility: CLINIC | Age: 72
End: 2017-03-27

## 2017-03-27 DIAGNOSIS — K21.9 GASTROESOPHAGEAL REFLUX DISEASE WITHOUT ESOPHAGITIS: ICD-10-CM

## 2017-03-27 RX ORDER — PANTOPRAZOLE SODIUM 40 MG/1
TABLET, DELAYED RELEASE ORAL
Qty: 30 TABLET | Refills: 11 | Status: SHIPPED | OUTPATIENT
Start: 2017-03-27

## 2017-03-28 ENCOUNTER — DOCUMENTATION ONLY (OUTPATIENT)
Dept: NEPHROLOGY | Facility: CLINIC | Age: 72
End: 2017-03-28

## 2017-04-10 ENCOUNTER — DOCUMENTATION ONLY (OUTPATIENT)
Dept: CARDIOLOGY | Facility: CLINIC | Age: 72
End: 2017-04-10

## 2017-04-10 ENCOUNTER — PATIENT MESSAGE (OUTPATIENT)
Dept: INTERNAL MEDICINE | Facility: CLINIC | Age: 72
End: 2017-04-10

## 2017-04-10 NOTE — PROGRESS NOTES
[employer] rep brought copy of subcutaneous ICD interrogation to clinic.  States pt is currently admitted to [facility], had therapy over the weekend.  Primary cardiologist, Dr. Anne, notified.  Interrogation results copied and sent to scan, original put in patient's device chart.

## 2017-04-12 ENCOUNTER — TELEPHONE (OUTPATIENT)
Dept: INTERNAL MEDICINE | Facility: CLINIC | Age: 72
End: 2017-04-12

## 2017-04-12 NOTE — TELEPHONE ENCOUNTER
If this request is related to his recent fracture then ortho may be able to help with whatever is needed. I may not be able to answer all of the  questions about fracture, status, and recovery.

## 2017-04-12 NOTE — TELEPHONE ENCOUNTER
Anuradha stated pt was discharged to home health from [facility] for one day before having to be readmitted.  Needs order for pt to resume HH.  Verbal order given.

## 2017-04-12 NOTE — TELEPHONE ENCOUNTER
----- Message from Marianna Vasquez sent at 4/12/2017  3:04 PM CDT -----  Contact: [facility]  home health order needed asap, pt discharged from hospital...[phone]

## 2017-04-13 ENCOUNTER — OFFICE VISIT (OUTPATIENT)
Dept: CARDIOLOGY | Facility: CLINIC | Age: 72
End: 2017-04-13
Payer: COMMERCIAL

## 2017-04-13 VITALS
HEART RATE: 68 BPM | SYSTOLIC BLOOD PRESSURE: 110 MMHG | DIASTOLIC BLOOD PRESSURE: 58 MMHG | HEIGHT: 70 IN | BODY MASS INDEX: 24.3 KG/M2 | WEIGHT: 169.75 LBS

## 2017-04-13 DIAGNOSIS — I25.10 CORONARY ARTERY DISEASE DUE TO LIPID RICH PLAQUE: Chronic | ICD-10-CM

## 2017-04-13 DIAGNOSIS — I25.9 CHRONIC ISCHEMIC HEART DISEASE: Chronic | ICD-10-CM

## 2017-04-13 DIAGNOSIS — I25.5 ISCHEMIC CARDIOMYOPATHY: Chronic | ICD-10-CM

## 2017-04-13 DIAGNOSIS — N18.6 ESRD (END STAGE RENAL DISEASE): Chronic | ICD-10-CM

## 2017-04-13 DIAGNOSIS — I10 ESSENTIAL HYPERTENSION: ICD-10-CM

## 2017-04-13 DIAGNOSIS — I25.83 CORONARY ARTERY DISEASE DUE TO LIPID RICH PLAQUE: Chronic | ICD-10-CM

## 2017-04-13 DIAGNOSIS — I27.20 PULMONARY HTN: ICD-10-CM

## 2017-04-13 DIAGNOSIS — R94.31 ABNORMAL ECG: Primary | ICD-10-CM

## 2017-04-13 DIAGNOSIS — I47.29 NSVT (NONSUSTAINED VENTRICULAR TACHYCARDIA): ICD-10-CM

## 2017-04-13 DIAGNOSIS — E78.2 MIXED HYPERLIPIDEMIA: Chronic | ICD-10-CM

## 2017-04-13 DIAGNOSIS — I51.7 LVH (LEFT VENTRICULAR HYPERTROPHY): ICD-10-CM

## 2017-04-13 DIAGNOSIS — I48.92 ATRIAL FLUTTER WITH RAPID VENTRICULAR RESPONSE: ICD-10-CM

## 2017-04-13 DIAGNOSIS — I12.0 HYPERTENSION, RENAL, STAGE 5 CHRONIC KIDNEY DISEASE OR END STAGE RENAL DISEASE: Chronic | ICD-10-CM

## 2017-04-13 DIAGNOSIS — I48.92 PAROXYSMAL ATRIAL FLUTTER: Chronic | ICD-10-CM

## 2017-04-13 DIAGNOSIS — I50.22 CHRONIC SYSTOLIC CONGESTIVE HEART FAILURE: ICD-10-CM

## 2017-04-13 DIAGNOSIS — Z95.810 S/P IMPLANTATION OF AUTOMATIC CARDIOVERTER/DEFIBRILLATOR (AICD): ICD-10-CM

## 2017-04-13 PROCEDURE — 1160F RVW MEDS BY RX/DR IN RCRD: CPT | Mod: S$GLB,,, | Performed by: INTERNAL MEDICINE

## 2017-04-13 PROCEDURE — 99999 PR PBB SHADOW E&M-EST. PATIENT-LVL II: CPT | Mod: PBBFAC,,, | Performed by: INTERNAL MEDICINE

## 2017-04-13 PROCEDURE — 1157F ADVNC CARE PLAN IN RCRD: CPT | Mod: 8P,S$GLB,, | Performed by: INTERNAL MEDICINE

## 2017-04-13 PROCEDURE — 99214 OFFICE O/P EST MOD 30 MIN: CPT | Mod: S$GLB,,, | Performed by: INTERNAL MEDICINE

## 2017-04-13 PROCEDURE — 93000 ELECTROCARDIOGRAM COMPLETE: CPT | Mod: S$GLB,,, | Performed by: INTERNAL MEDICINE

## 2017-04-13 PROCEDURE — 1159F MED LIST DOCD IN RCRD: CPT | Mod: S$GLB,,, | Performed by: INTERNAL MEDICINE

## 2017-04-13 RX ORDER — QUETIAPINE FUMARATE 25 MG/1
25 TABLET, FILM COATED ORAL NIGHTLY
Refills: 0 | COMMUNITY
Start: 2017-04-05 | End: 2017-04-18 | Stop reason: SDUPTHER

## 2017-04-13 RX ORDER — AMIODARONE HYDROCHLORIDE 200 MG/1
200 TABLET ORAL 2 TIMES DAILY
Refills: 0 | COMMUNITY
Start: 2017-04-05

## 2017-04-13 RX ORDER — SERTRALINE HYDROCHLORIDE 50 MG/1
50 TABLET, FILM COATED ORAL DAILY
Refills: 0 | COMMUNITY
Start: 2017-04-05

## 2017-04-13 RX ORDER — CARVEDILOL 12.5 MG/1
1 TABLET ORAL 2 TIMES DAILY
Refills: 0 | COMMUNITY
Start: 2017-04-05
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
## 2017-04-13 NOTE — MR AVS SNAPSHOT
[facility]  [address] [facility] [address]  Phone: [phone]  Fax: [phone]                  Quinn Hutchins   2017 1:40 PM   Office Visit    Description:  Male : 1945   Provider:  Darron Anne MD   Department:  [facility]           Reason for Visit     Hypertension     Hyperlipidemia     Coronary Artery Disease     Congestive Heart Failure     Atrial Flutter           Diagnoses this Visit        Comments    Abnormal ECG    -  Primary     Atrial flutter with rapid ventricular response         Chronic ischemic heart disease         Chronic systolic congestive heart failure         Coronary artery disease due to lipid rich plaque         ESRD (end stage renal disease)         Essential hypertension         Ischemic cardiomyopathy         Hypertension, renal, stage 5 chronic kidney disease or end stage renal disease         Mixed hyperlipidemia         LVH (left ventricular hypertrophy)         Paroxysmal atrial flutter         NSVT (nonsustained ventricular tachycardia)         Pulmonary HTN         S/P implantation of automatic cardioverter/defibrillator (AICD)                To Do List           Future Appointments        Provider Department Dept Phone    2017 10:00 AM Jameel Almaguer MD [facility] - Internal Medicine [phone]    2017 11:20 AM Desmond Woodward MD [facility] [phone]    2017 1:40 PM Darron Anne MD [facility] [phone]    2017 11:10 AM LABORATORY, [address] [facility] [phone]    2017 11:30 AM [facility] CT1 LIMIT 500 LBS [facility] -  [phone]      Goals (5 Years of Data)     Continue renal heart healthy diet.       Follow-Up and Disposition     Return in about 3 months (around 2017).    Follow-up and Disposition History      [facility]     [facility] Nurse Care Line -  Assistance  Unless otherwise directed by your provider,  please contact [facility], our nurse care line that is available for 24/7 assistance.     Registered nurses in the [facility] provide: appointment scheduling, clinical advisement, health education, and other advisory services.  Call: [phone] (toll free)               Medications           Message regarding Medications     Verify the changes and/or additions to your medication regime listed below are the same as discussed with your clinician today.  If any of these changes or additions are incorrect, please notify your healthcare provider.        STOP taking these medications     apixaban 2.5 mg Tab Take 1 tablet (2.5 mg total) by mouth 2 (two) times daily.           Verify that the below list of medications is an accurate representation of the medications you are currently taking.  If none reported, the list may be blank. If incorrect, please contact your healthcare provider. Carry this list with you in case of emergency.           Current Medications     acetaminophen (TYLENOL) 500 MG tablet Take 500 mg by mouth every 6 (six) hours as needed for Pain.    acitretin (SORIATANE) 10 MG capsule Take 10 mg by mouth every evening.     amiodarone (PACERONE) 200 MG Tab Take 200 mg by mouth 2 (two) times daily.    aspirin (ECOTRIN) 81 MG EC tablet Take 81 mg by mouth once daily.    atorvastatin (LIPITOR) 40 MG tablet TAKE 1 TABLET BY MOUTH EVERY EVENING    carvedilol (COREG) 12.5 MG tablet Take 1 tablet by mouth 2 (two) times daily.    cloNIDine (CATAPRES) 0.3 MG tablet Take 0.3 mg by mouth as needed.    clopidogrel (PLAVIX) 75 mg tablet Take 75 mg by mouth once daily.    docusate sodium (COLACE) 100 MG capsule Take 1 capsule (100 mg total) by mouth 3 (three) times daily as needed for Constipation.    isosorbide mononitrate (IMDUR) 30 MG 24 hr tablet Take 1 tablet (30 mg total) by mouth once daily.    loratadine (CLARITIN) 10 mg tablet Take 10 mg by mouth once daily.    mupirocin (BACTROBAN) 2 %  "ointment Apply to wound infection 2 times a week for 7 days    nitroGLYCERIN (NITROSTAT) 0.4 MG SL tablet Place 1 tablet (0.4 mg total) under the tongue every 5 (five) minutes as needed for Chest pain.    ondansetron (ZOFRAN-ODT) 4 MG TbDL Take 2 tablets (8 mg total) by mouth every 8 (eight) hours as needed.    pantoprazole (PROTONIX) 40 MG tablet TAKE 1 TABLET (40 MG TOTAL) BY MOUTH ONCE DAILY.    predniSONE (DELTASONE) 5 MG tablet TAKE 1 TABLET(S) ORAL (BY MOUTH) EVERY DAY    pyridoxine (VITAMIN B-6) 200 mg Tab Take 1 tablet by mouth every evening.     quetiapine (SEROQUEL) 25 MG Tab Take 25 mg by mouth every evening.    RANEXA 500 mg Tb12 TAKE 1 TABLET TWICE A DAY    sertraline (ZOLOFT) 50 MG tablet Take 50 mg by mouth once daily.    sevelamer carbonate (RENVELA) 800 mg Tab Take 1 tablet (800 mg total) by mouth 3 (three) times daily with meals.    thiamine 100 MG tablet Take 1 tablet (100 mg total) by mouth once daily.    tramadol (ULTRAM) 50 mg tablet Take 1 tablet (50 mg total) by mouth every 6 (six) hours as needed for Pain.           Clinical Reference Information           Your Vitals Were     BP Pulse Height Weight BMI    110/58 (BP Location: Right arm, Patient Position: Sitting) 68 5' 10" (1.778 m) 77 kg (169 lb 12.1 oz) 24.36 kg/m2      Blood Pressure          Most Recent Value    Right Arm BP - Sitting  110/58    Reason for not completing BP on both arms  AV shunt    BP  (!)  110/58      Allergies as of 4/13/2017     Codeine    Hydrocodone    Percocet [Oxycodone-acetaminophen]    Doxycycline    Levaquin [Levofloxacin]    Xanax [Alprazolam]      Immunizations Administered on Date of Encounter - 4/13/2017     None      Orders Placed During Today's Visit      Normal Orders This Visit    IN OFFICE EKG 12-LEAD (to Muse)       Maintenance Dialysis History     Start End Type Comments Center    10/8/2012  [place]  [facility]            Current Dialysis Center Information     [facility] " [address] Phone #:  [phone]    Contact:  N/A [place]  [address] Fax #:  [phone]            Language Assistance Services     ATTENTION: Language assistance services are available, free of charge. Please call [phone].      ATENCIÓN: Si habla mary, tiene a koehler disposición servicios gratuitos de asistencia lingüística. Llame al [phone].     CHÚ Ý: N?u b?n nói Ti?ng Vi?t, có các d?ch v? h? tr? ngôn ng? mi?n phí dành cho b?n. G?i s? [phone].         [facility] complies with applicable Federal civil rights laws and does not discriminate on the basis of race, color, national origin, age, disability, or sex.

## 2017-04-18 ENCOUNTER — OFFICE VISIT (OUTPATIENT)
Dept: INTERNAL MEDICINE | Facility: CLINIC | Age: 72
DRG: 871 | End: 2017-04-18
Payer: COMMERCIAL

## 2017-04-18 VITALS
OXYGEN SATURATION: 95 % | HEIGHT: 70 IN | BODY MASS INDEX: 24.78 KG/M2 | SYSTOLIC BLOOD PRESSURE: 150 MMHG | TEMPERATURE: 100 F | HEART RATE: 80 BPM | DIASTOLIC BLOOD PRESSURE: 72 MMHG | WEIGHT: 173.06 LBS

## 2017-04-18 DIAGNOSIS — G47.00 INSOMNIA, UNSPECIFIED TYPE: ICD-10-CM

## 2017-04-18 DIAGNOSIS — I50.22 CHRONIC SYSTOLIC CONGESTIVE HEART FAILURE: ICD-10-CM

## 2017-04-18 DIAGNOSIS — S72.002A CLOSED LEFT HIP FRACTURE, INITIAL ENCOUNTER: Primary | ICD-10-CM

## 2017-04-18 DIAGNOSIS — I12.0 HYPERTENSION, RENAL, STAGE 5 CHRONIC KIDNEY DISEASE OR END STAGE RENAL DISEASE: Chronic | ICD-10-CM

## 2017-04-18 DIAGNOSIS — Z91.09 ENVIRONMENTAL ALLERGIES: ICD-10-CM

## 2017-04-18 PROCEDURE — 99999 PR PBB SHADOW E&M-EST. PATIENT-LVL V: CPT | Mod: PBBFAC,,, | Performed by: INTERNAL MEDICINE

## 2017-04-18 PROCEDURE — 1160F RVW MEDS BY RX/DR IN RCRD: CPT | Mod: S$GLB,,, | Performed by: INTERNAL MEDICINE

## 2017-04-18 PROCEDURE — 1157F ADVNC CARE PLAN IN RCRD: CPT | Mod: 8P,S$GLB,, | Performed by: INTERNAL MEDICINE

## 2017-04-18 PROCEDURE — 1125F AMNT PAIN NOTED PAIN PRSNT: CPT | Mod: S$GLB,,, | Performed by: INTERNAL MEDICINE

## 2017-04-18 PROCEDURE — 1159F MED LIST DOCD IN RCRD: CPT | Mod: S$GLB,,, | Performed by: INTERNAL MEDICINE

## 2017-04-18 PROCEDURE — 99214 OFFICE O/P EST MOD 30 MIN: CPT | Mod: S$GLB,,, | Performed by: INTERNAL MEDICINE

## 2017-04-18 RX ORDER — FLUTICASONE PROPIONATE 50 MCG
2 SPRAY, SUSPENSION (ML) NASAL DAILY
Qty: 16 G | Refills: 0 | Status: SHIPPED | OUTPATIENT
Start: 2017-04-18 | End: 2017-05-18

## 2017-04-18 RX ORDER — QUETIAPINE FUMARATE 25 MG/1
25 TABLET, FILM COATED ORAL NIGHTLY
Qty: 30 TABLET | Refills: 0 | Status: SHIPPED | OUTPATIENT
Start: 2017-04-18

## 2017-04-18 RX ORDER — GLUCOSAMINE/CHONDR SU A SOD 167-133 MG
200 CAPSULE ORAL DAILY
Refills: 0 | COMMUNITY
Start: 2017-04-06 | End: 2017-04-18 | Stop reason: SDUPTHER

## 2017-04-18 RX ORDER — HYDROCODONE BITARTRATE AND ACETAMINOPHEN 5; 325 MG/1; MG/1
1 TABLET ORAL EVERY 6 HOURS PRN
Refills: 0 | COMMUNITY
Start: 2017-04-12 | End: 2017-04-18

## 2017-04-18 NOTE — PROGRESS NOTES
Subjective:      Patient ID: Quinn Hutchins is a 71 y.o. male.    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     Secondary hyperparathyroidism of renal origin     Refractive error - Both Eyes     Chronic ischemic heart disease     Mixed hyperlipidemia     Ischemic cardiomyopathy     Abnormal ECG     Skin cancer of scalp     ESRD (end stage renal disease)     Knee pain     Osteoporosis     Essential hypertension     A-V fistula     Problem with dialysis shunt     JEROME (obstructive sleep apnea)     Chronic systolic congestive heart failure     GERD (gastroesophageal reflux disease)     Syncope     LVH (left ventricular hypertrophy)     Pulmonary HTN     Paroxysmal atrial flutter     Cervical spinal stenosis     Atrial flutter with rapid ventricular response     Back pain     Complication of AV dialysis fistula     Current chronic use of systemic steroids     SVT (supraventricular tachycardia)     NSVT (nonsustained ventricular tachycardia)     Hematoma of arm     Renal failure     Osteoarthritis of right knee     S/P implantation of automatic cardioverter/defibrillator (AICD)     Elevated troponin I level     Nausea & vomiting     Closed left hip fracture     Fall (on) (from) other stairs and steps, initial encounter    Here today for hospital f/u appt. Left hip fracture 3/12 adn transferred to [facility] for 3 weeks. 10 days ago saw renal and received dialysis. Hematoma to fistula noted. Saw vascular that day. [facility] hemat[facility] to [facility]. Sx completed. icd shocked him during sx. Pt admitted to ICU without completion of fistula procedure. Pt released from hospital 1 week ago. Pt saw cards (  "[facility]) as outpt 4/13 without change in tx plan. Pt doing well and participating in PT and progressing. Dialysis going well. Started with runny nose, post nasal drip causing nausea yesterday. No emesis.  zofran helping some. Wife reports pt responded well with seroquel in hospital for insomnia and request refill.          Review of Systems   Constitutional: Negative for chills and fever.   HENT: Positive for postnasal drip, rhinorrhea and sneezing. Negative for ear pain, sinus pressure and sore throat.    Respiratory: Positive for cough and shortness of breath. Negative for wheezing.    Cardiovascular: Negative for chest pain.   Gastrointestinal: Negative for abdominal pain and blood in stool.   Genitourinary: Negative for dysuria and hematuria.   Neurological: Negative for syncope.     Objective:   BP (!) 150/72 (BP Location: Right arm, Patient Position: Sitting)  Pulse 80  Temp 99.6 °F (37.6 °C) (Tympanic)   Ht 5' 10" (1.778 m)  Wt 78.5 kg (173 lb 1 oz)  SpO2 95%  BMI 24.83 kg/m2    Physical Exam   Constitutional: He is oriented to person, place, and time. He appears well-developed and well-nourished. No distress.   HENT:   Head: Normocephalic and atraumatic.   Right Ear: Tympanic membrane normal.   Left Ear: Tympanic membrane normal.   Nose: Mucosal edema and rhinorrhea present. Right sinus exhibits no maxillary sinus tenderness and no frontal sinus tenderness. Left sinus exhibits no maxillary sinus tenderness and no frontal sinus tenderness.   Mouth/Throat: Posterior oropharyngeal erythema present. No oropharyngeal exudate or posterior oropharyngeal edema.   Eyes: EOM are normal. Pupils are equal, round, and reactive to light.   Neck: Neck supple.   Cardiovascular: Normal rate and regular rhythm.    Murmur heard.  Pulmonary/Chest: Effort normal and breath sounds normal. He has no wheezes. He has no rales.   Musculoskeletal: He exhibits edema.   Lymphadenopathy:     He has no cervical adenopathy. "   Neurological: He is alert and oriented to person, place, and time.   Skin: Skin is warm and dry.   Psychiatric: He has a normal mood and affect. His behavior is normal.       Assessment:     1. Closed left hip fracture, initial encounter    2. Hypertension, renal, stage 5 chronic kidney disease or end stage renal disease    3. Chronic systolic congestive heart failure    4. Environmental allergies    5. Insomnia, unspecified type      Plan:   Closed left hip fracture, initial encounter  Cont pt and ortho f/u    Hypertension, renal, stage 5 chronic kidney disease or end stage renal disease  Not at goal today  Cont meds and f/u as per renal and cards    Chronic systolic congestive heart failure  compensated    Environmental allergies  -     fluticasone (FLONASE) 50 mcg/actuation nasal spray; 2 sprays by Each Nare route once daily.  Dispense: 16 g; Refill: 0    Insomnia, unspecified type  -     quetiapine (SEROQUEL) 25 MG Tab; Take 1 tablet (25 mg total) by mouth every evening. Prn sleep  Dispense: 30 tablet; Refill: 0      Allegra or zyrtec instead of claritin      Lab Frequency Next Occurrence   URIC ACID Once 3/31/2016   Protime-INR Once 7/5/2016   Creatinine, serum Once 7/21/2016   CT Abdomen Pelvis W Wo Contrast Once 7/21/2016   PSA, Screening Once 7/21/2016   2D echo with color flow doppler Once 9/9/2016   Protime-INR Once 10/10/2016   CBC auto differential Once 10/10/2016   Basic metabolic panel Once 10/10/2016   APTT Once 10/10/2016   Protime-INR Once 12/8/2016   Basic metabolic panel Once 12/8/2016   CBC auto differential Once 12/8/2016   APTT Once 12/8/2016   CBC auto differential     Comprehensive metabolic panel     Sedimentation rate, manual     C-reactive protein     URIC ACID     CBC auto differential     Comprehensive metabolic panel     Sedimentation rate, manual     C-reactive protein     CBC auto differential     Comprehensive metabolic panel     C-reactive protein     Sedimentation rate, manual      Uric acid     CBC auto differential     Comprehensive metabolic panel     C-reactive protein     Sedimentation rate, manual     Uric acid     ICD programming           Return if symptoms worsen or fail to improve.

## 2017-04-18 NOTE — MR AVS SNAPSHOT
[facility]  [address] [facility] [address] [place] [address]  Phone: [phone]  Fax: [phone]                  Quinn Hutchins   2017 10:00 AM   Office Visit    Description:  Male : 1945   Provider:  Jameel Almaguer MD   Department:  [facility]           Reason for Visit     Hospital Follow Up     Nausea           Diagnoses this Visit        Comments    Closed left hip fracture, initial encounter    -  Primary     Hypertension, renal, stage 5 chronic kidney disease or end stage renal disease         Chronic systolic congestive heart failure         Environmental allergies         Insomnia, unspecified type                To Do List           Future Appointments        Provider Department Dept Phone    2017 11:20 AM MD Harley Gaming  Arrhythmia [phone]    2017 1:40 PM Darron Anne MD [facility] Cardiology [phone]    2017 11:10 AM LABORATORY, [address] [facility] [phone]    2017 11:30 AM [facility] CT1 LIMIT 500 LBS [facility] -  [phone]    2017 1:00 PM Aristeo Valentine IV, MD [facility] Urology [phone]      Goals (5 Years of Data)     Continue renal heart healthy diet.       Follow-Up and Disposition     Return if symptoms worsen or fail to improve.       These Medications        Disp Refills Start End    fluticasone (FLONASE) 50 mcg/actuation nasal spray 16 g 0 2017    2 sprays by Each Nare route once daily. - Each Nare    Pharmacy: [facility]/pharmacy #[id number] - [place] - [address] [facility] Ph #: [phone]       quetiapine (SEROQUEL) 25 MG Tab 30 tablet 0 2017     Take 1 tablet (25 mg total) by mouth every evening. Prn sleep - Oral    Pharmacy: [facility]/pharmacy #[id number]  [place] - [address] [address] Ph #: [phone]         [facility]     [facility] Nurse Care Line -  Assistance  Unless otherwise directed by your provider, please contact  [facility], our nurse care line that is available for  assistance.     Registered nurses in the [facility] provide: appointment scheduling, clinical advisement, health education, and other advisory services.  Call: [phone] (toll free)               Medications           Message regarding Medications     Verify the changes and/or additions to your medication regime listed below are the same as discussed with your clinician today.  If any of these changes or additions are incorrect, please notify your healthcare provider.        START taking these NEW medications        Refills    fluticasone (FLONASE) 50 mcg/actuation nasal spray 0    Si sprays by Each Nare route once daily.    Class: Normal    Route: Each Nare      CHANGE how you are taking these medications     Start Taking Instead of    quetiapine (SEROQUEL) 25 MG Tab quetiapine (SEROQUEL) 25 MG Tab    Dosage:  Take 1 tablet (25 mg total) by mouth every evening. Prn sleep Dosage:  Take 25 mg by mouth every evening.    Reason for Change:  Reorder       STOP taking these medications     hydrocodone-acetaminophen 5-325mg (NORCO) 5-325 mg per tablet Take 1 tablet by mouth every 6 (six) hours as needed.           Verify that the below list of medications is an accurate representation of the medications you are currently taking.  If none reported, the list may be blank. If incorrect, please contact your healthcare provider. Carry this list with you in case of emergency.           Current Medications     acetaminophen (TYLENOL) 500 MG tablet Take 500 mg by mouth every 6 (six) hours as needed for Pain.    acitretin (SORIATANE) 10 MG capsule Take 10 mg by mouth every evening.     amiodarone (PACERONE) 200 MG Tab Take 200 mg by mouth 2 (two) times daily.    aspirin (ECOTRIN) 81 MG EC tablet Take 81 mg by mouth once daily.    atorvastatin (LIPITOR) 40 MG tablet TAKE 1 TABLET BY MOUTH EVERY EVENING    carvedilol (COREG) 12.5 MG tablet Take 1  "tablet by mouth 2 (two) times daily.    cloNIDine (CATAPRES) 0.3 MG tablet Take 0.3 mg by mouth as needed.    clopidogrel (PLAVIX) 75 mg tablet Take 75 mg by mouth once daily.    docusate sodium (COLACE) 100 MG capsule Take 1 capsule (100 mg total) by mouth 3 (three) times daily as needed for Constipation.    isosorbide mononitrate (IMDUR) 30 MG 24 hr tablet Take 1 tablet (30 mg total) by mouth once daily.    loratadine (CLARITIN) 10 mg tablet Take 10 mg by mouth once daily.    mupirocin (BACTROBAN) 2 % ointment Apply to wound infection 2 times a week for 7 days    nitroGLYCERIN (NITROSTAT) 0.4 MG SL tablet Place 1 tablet (0.4 mg total) under the tongue every 5 (five) minutes as needed for Chest pain.    ondansetron (ZOFRAN-ODT) 4 MG TbDL Take 2 tablets (8 mg total) by mouth every 8 (eight) hours as needed.    pantoprazole (PROTONIX) 40 MG tablet TAKE 1 TABLET (40 MG TOTAL) BY MOUTH ONCE DAILY.    predniSONE (DELTASONE) 5 MG tablet TAKE 1 TABLET(S) ORAL (BY MOUTH) EVERY DAY    pyridoxine (VITAMIN B-6) 200 mg Tab Take 1 tablet by mouth every evening.     quetiapine (SEROQUEL) 25 MG Tab Take 1 tablet (25 mg total) by mouth every evening. Prn sleep    RANEXA 500 mg Tb12 TAKE 1 TABLET TWICE A DAY    sertraline (ZOLOFT) 50 MG tablet Take 50 mg by mouth once daily.    sevelamer carbonate (RENVELA) 800 mg Tab Take 1 tablet (800 mg total) by mouth 3 (three) times daily with meals.    thiamine 100 MG tablet Take 1 tablet (100 mg total) by mouth once daily.    tramadol (ULTRAM) 50 mg tablet Take 1 tablet (50 mg total) by mouth every 6 (six) hours as needed for Pain.    fluticasone (FLONASE) 50 mcg/actuation nasal spray 2 sprays by Each Nare route once daily.           Clinical Reference Information           Your Vitals Were     BP Pulse Temp Height Weight SpO2    150/72 (BP Location: Right arm, Patient Position: Sitting) 80 99.6 °F (37.6 °C) (Tympanic) 5' 10" (1.778 m) 78.5 kg (173 lb 1 oz) 95%    BMI                24.83 " kg/m2          Blood Pressure          Most Recent Value    BP  (!)  150/72      Allergies as of 4/18/2017     Codeine    Hydrocodone    Percocet [Oxycodone-acetaminophen]    Doxycycline    Levaquin [Levofloxacin]    Xanax [Alprazolam]      Immunizations Administered on Date of Encounter - 4/18/2017     None      Maintenance Dialysis History     Start End Type Comments Center    [facility]            Current Dialysis Center Information     [facility] [address] Phone #:  [phone]    Contact:  N/A [place] VILL[place]VA  [address] Fax #:  [phone]            Instructions    Allegra or zyrtec instead of claritin         Language Assistance Services     ATTENTION: Language assistance services are available, free of charge. Please call [phone].      ATENCIÓN: Si manju hernandez, tiene a koehler disposición servicios gratuitos de asistencia lingüística. Llame al [phone].     CHÚ Ý: N?u b?n nói Ti?ng Vi?t, có các d?ch v? h? tr? ngôn ng? mi?n phí dành cho b?n. G?i s? [phone].         [facility] complies with applicable Federal civil rights laws and does not discriminate on the basis of race, color, national origin, age, disability, or sex.

## 2017-04-20 ENCOUNTER — HOSPITAL ENCOUNTER (INPATIENT)
Facility: HOSPITAL | Age: 72
LOS: 5 days | Discharge: HOSPICE | DRG: 871 | End: 2017-04-25
Attending: EMERGENCY MEDICINE | Admitting: INTERNAL MEDICINE
Payer: COMMERCIAL

## 2017-04-20 ENCOUNTER — OFFICE VISIT (OUTPATIENT)
Dept: CARDIOLOGY | Facility: CLINIC | Age: 72
DRG: 871 | End: 2017-04-20
Payer: COMMERCIAL

## 2017-04-20 VITALS — DIASTOLIC BLOOD PRESSURE: 40 MMHG | SYSTOLIC BLOOD PRESSURE: 100 MMHG | HEART RATE: 68 BPM | HEIGHT: 70 IN

## 2017-04-20 DIAGNOSIS — N18.6 ESRD (END STAGE RENAL DISEASE): Chronic | ICD-10-CM

## 2017-04-20 DIAGNOSIS — I48.92 ATRIAL FLUTTER WITH RAPID VENTRICULAR RESPONSE: Primary | ICD-10-CM

## 2017-04-20 DIAGNOSIS — N18.6 ESRD (END STAGE RENAL DISEASE): Primary | Chronic | ICD-10-CM

## 2017-04-20 DIAGNOSIS — R55 SYNCOPE: ICD-10-CM

## 2017-04-20 DIAGNOSIS — Z95.810 S/P IMPLANTATION OF AUTOMATIC CARDIOVERTER/DEFIBRILLATOR (AICD): ICD-10-CM

## 2017-04-20 DIAGNOSIS — R65.20 SEVERE SEPSIS: ICD-10-CM

## 2017-04-20 DIAGNOSIS — R55 SYNCOPE, UNSPECIFIED SYNCOPE TYPE: ICD-10-CM

## 2017-04-20 DIAGNOSIS — I10 ESSENTIAL HYPERTENSION: ICD-10-CM

## 2017-04-20 DIAGNOSIS — I48.92 PAROXYSMAL ATRIAL FLUTTER: Chronic | ICD-10-CM

## 2017-04-20 DIAGNOSIS — I25.9 CHRONIC ISCHEMIC HEART DISEASE: Chronic | ICD-10-CM

## 2017-04-20 DIAGNOSIS — D64.9 ANEMIA, UNSPECIFIED TYPE: ICD-10-CM

## 2017-04-20 DIAGNOSIS — A41.9 SEVERE SEPSIS: ICD-10-CM

## 2017-04-20 DIAGNOSIS — I47.29 NSVT (NONSUSTAINED VENTRICULAR TACHYCARDIA): ICD-10-CM

## 2017-04-20 PROBLEM — D72.829 LEUKOCYTOSIS: Status: ACTIVE | Noted: 2017-04-20

## 2017-04-20 PROBLEM — R79.89 ELEVATED LFTS: Status: ACTIVE | Noted: 2017-04-20

## 2017-04-20 LAB
ABO + RH BLD: NORMAL
ALBUMIN SERPL BCP-MCNC: 2.3 G/DL
ALP SERPL-CCNC: 119 U/L
ALT SERPL W/O P-5'-P-CCNC: 55 U/L
ANION GAP SERPL CALC-SCNC: 15 MMOL/L
AST SERPL-CCNC: 176 U/L
BASOPHILS # BLD AUTO: 0.01 K/UL
BASOPHILS NFR BLD: 0.1 %
BILIRUB SERPL-MCNC: 0.7 MG/DL
BLD GP AB SCN CELLS X3 SERPL QL: NORMAL
BLD PROD TYP BPU: NORMAL
BLOOD UNIT EXPIRATION DATE: NORMAL
BLOOD UNIT TYPE CODE: 9500
BLOOD UNIT TYPE: NORMAL
BNP SERPL-MCNC: >4900 PG/ML
BUN SERPL-MCNC: 33 MG/DL
CALCIUM SERPL-MCNC: 9.5 MG/DL
CHLORIDE SERPL-SCNC: 97 MMOL/L
CK SERPL-CCNC: 149 U/L
CO2 SERPL-SCNC: 25 MMOL/L
CODING SYSTEM: NORMAL
CREAT SERPL-MCNC: 4.9 MG/DL
DIFFERENTIAL METHOD: ABNORMAL
DISPENSE STATUS: NORMAL
EOSINOPHIL # BLD AUTO: 0 K/UL
EOSINOPHIL NFR BLD: 0 %
ERYTHROCYTE [DISTWIDTH] IN BLOOD BY AUTOMATED COUNT: 16.4 %
EST. GFR  (AFRICAN AMERICAN): 13 ML/MIN/1.73 M^2
EST. GFR  (NON AFRICAN AMERICAN): 11 ML/MIN/1.73 M^2
GIANT PLATELETS BLD QL SMEAR: PRESENT
GLUCOSE SERPL-MCNC: 80 MG/DL
HCT VFR BLD AUTO: 23 %
HGB BLD-MCNC: 7.6 G/DL
LYMPHOCYTES # BLD AUTO: 0.3 K/UL
LYMPHOCYTES NFR BLD: 2.6 %
MCH RBC QN AUTO: 31.7 PG
MCHC RBC AUTO-ENTMCNC: 33 %
MCV RBC AUTO: 96 FL
MONOCYTES # BLD AUTO: 1.1 K/UL
MONOCYTES NFR BLD: 9.1 %
NEUTROPHILS # BLD AUTO: 10.7 K/UL
NEUTROPHILS NFR BLD: 88.9 %
NUM UNITS TRANS PACKED RBC: NORMAL
PLATELET # BLD AUTO: 147 K/UL
PLATELET BLD QL SMEAR: ABNORMAL
PMV BLD AUTO: 10.5 FL
POTASSIUM SERPL-SCNC: 3.9 MMOL/L
PROT SERPL-MCNC: 6.2 G/DL
RBC # BLD AUTO: 2.4 M/UL
SODIUM SERPL-SCNC: 137 MMOL/L
TROPONIN I SERPL DL<=0.01 NG/ML-MCNC: 0.66 NG/ML
WBC # BLD AUTO: 12.16 K/UL

## 2017-04-20 PROCEDURE — 84484 ASSAY OF TROPONIN QUANT: CPT

## 2017-04-20 PROCEDURE — 87040 BLOOD CULTURE FOR BACTERIA: CPT

## 2017-04-20 PROCEDURE — 86900 BLOOD TYPING SEROLOGIC ABO: CPT

## 2017-04-20 PROCEDURE — 99214 OFFICE O/P EST MOD 30 MIN: CPT | Mod: S$GLB,,, | Performed by: INTERNAL MEDICINE

## 2017-04-20 PROCEDURE — 83540 ASSAY OF IRON: CPT

## 2017-04-20 PROCEDURE — 82550 ASSAY OF CK (CPK): CPT

## 2017-04-20 PROCEDURE — 80053 COMPREHEN METABOLIC PANEL: CPT

## 2017-04-20 PROCEDURE — P9016 RBC LEUKOCYTES REDUCED: HCPCS

## 2017-04-20 PROCEDURE — 1125F AMNT PAIN NOTED PAIN PRSNT: CPT | Mod: S$GLB,,, | Performed by: INTERNAL MEDICINE

## 2017-04-20 PROCEDURE — 82746 ASSAY OF FOLIC ACID SERUM: CPT

## 2017-04-20 PROCEDURE — 82728 ASSAY OF FERRITIN: CPT

## 2017-04-20 PROCEDURE — 93010 ELECTROCARDIOGRAM REPORT: CPT | Mod: ,,, | Performed by: INTERNAL MEDICINE

## 2017-04-20 PROCEDURE — 99285 EMERGENCY DEPT VISIT HI MDM: CPT | Mod: 25

## 2017-04-20 PROCEDURE — 1157F ADVNC CARE PLAN IN RCRD: CPT | Mod: 8P,S$GLB,, | Performed by: INTERNAL MEDICINE

## 2017-04-20 PROCEDURE — 82607 VITAMIN B-12: CPT

## 2017-04-20 PROCEDURE — 99223 1ST HOSP IP/OBS HIGH 75: CPT | Mod: ,,, | Performed by: INTERNAL MEDICINE

## 2017-04-20 PROCEDURE — 1159F MED LIST DOCD IN RCRD: CPT | Mod: S$GLB,,, | Performed by: INTERNAL MEDICINE

## 2017-04-20 PROCEDURE — 87186 SC STD MICRODIL/AGAR DIL: CPT

## 2017-04-20 PROCEDURE — 87077 CULTURE AEROBIC IDENTIFY: CPT

## 2017-04-20 PROCEDURE — 83880 ASSAY OF NATRIURETIC PEPTIDE: CPT

## 2017-04-20 PROCEDURE — 25000003 PHARM REV CODE 250: Performed by: INTERNAL MEDICINE

## 2017-04-20 PROCEDURE — 86901 BLOOD TYPING SEROLOGIC RH(D): CPT

## 2017-04-20 PROCEDURE — 85025 COMPLETE CBC W/AUTO DIFF WBC: CPT

## 2017-04-20 PROCEDURE — 25000003 PHARM REV CODE 250: Performed by: NURSE PRACTITIONER

## 2017-04-20 PROCEDURE — 21400001 HC TELEMETRY ROOM

## 2017-04-20 PROCEDURE — 93005 ELECTROCARDIOGRAM TRACING: CPT

## 2017-04-20 PROCEDURE — 86920 COMPATIBILITY TEST SPIN: CPT

## 2017-04-20 PROCEDURE — 99999 PR PBB SHADOW E&M-EST. PATIENT-LVL III: CPT | Mod: PBBFAC,,, | Performed by: INTERNAL MEDICINE

## 2017-04-20 PROCEDURE — 1160F RVW MEDS BY RX/DR IN RCRD: CPT | Mod: S$GLB,,, | Performed by: INTERNAL MEDICINE

## 2017-04-20 RX ORDER — IBUPROFEN 200 MG
16 TABLET ORAL
Status: DISCONTINUED | OUTPATIENT
Start: 2017-04-20 | End: 2017-04-25 | Stop reason: HOSPADM

## 2017-04-20 RX ORDER — HYDROCODONE BITARTRATE AND ACETAMINOPHEN 500; 5 MG/1; MG/1
TABLET ORAL
Status: DISCONTINUED | OUTPATIENT
Start: 2017-04-20 | End: 2017-04-24

## 2017-04-20 RX ORDER — AMOXICILLIN 250 MG
1 CAPSULE ORAL 2 TIMES DAILY
Status: DISCONTINUED | OUTPATIENT
Start: 2017-04-20 | End: 2017-04-25 | Stop reason: HOSPADM

## 2017-04-20 RX ORDER — CETIRIZINE HYDROCHLORIDE 10 MG/1
10 TABLET ORAL DAILY
COMMUNITY

## 2017-04-20 RX ORDER — GLUCAGON 1 MG
1 KIT INJECTION
Status: DISCONTINUED | OUTPATIENT
Start: 2017-04-20 | End: 2017-04-25 | Stop reason: HOSPADM

## 2017-04-20 RX ORDER — ACETAMINOPHEN 325 MG/1
650 TABLET ORAL EVERY 8 HOURS PRN
Status: DISCONTINUED | OUTPATIENT
Start: 2017-04-20 | End: 2017-04-25 | Stop reason: HOSPADM

## 2017-04-20 RX ORDER — ONDANSETRON 2 MG/ML
4 INJECTION INTRAMUSCULAR; INTRAVENOUS EVERY 12 HOURS PRN
Status: DISCONTINUED | OUTPATIENT
Start: 2017-04-20 | End: 2017-04-25 | Stop reason: HOSPADM

## 2017-04-20 RX ORDER — PANTOPRAZOLE SODIUM 40 MG/1
40 TABLET, DELAYED RELEASE ORAL DAILY
Status: DISCONTINUED | OUTPATIENT
Start: 2017-04-21 | End: 2017-04-25 | Stop reason: HOSPADM

## 2017-04-20 RX ORDER — CARVEDILOL 12.5 MG/1
12.5 TABLET ORAL 2 TIMES DAILY
Status: DISCONTINUED | OUTPATIENT
Start: 2017-04-20 | End: 2017-04-25 | Stop reason: HOSPADM

## 2017-04-20 RX ORDER — NAPROXEN SODIUM 220 MG/1
81 TABLET, FILM COATED ORAL DAILY
Status: DISCONTINUED | OUTPATIENT
Start: 2017-04-21 | End: 2017-04-25 | Stop reason: HOSPADM

## 2017-04-20 RX ORDER — IBUPROFEN 200 MG
24 TABLET ORAL
Status: DISCONTINUED | OUTPATIENT
Start: 2017-04-20 | End: 2017-04-25 | Stop reason: HOSPADM

## 2017-04-20 RX ORDER — AMIODARONE HYDROCHLORIDE 200 MG/1
200 TABLET ORAL DAILY
Status: DISCONTINUED | OUTPATIENT
Start: 2017-04-20 | End: 2017-04-22

## 2017-04-20 RX ORDER — CLOPIDOGREL BISULFATE 75 MG/1
75 TABLET ORAL DAILY
Status: DISCONTINUED | OUTPATIENT
Start: 2017-04-21 | End: 2017-04-24

## 2017-04-20 RX ADMIN — CARVEDILOL 12.5 MG: 12.5 TABLET, FILM COATED ORAL at 11:04

## 2017-04-20 RX ADMIN — AMIODARONE HYDROCHLORIDE 200 MG: 200 TABLET ORAL at 11:04

## 2017-04-20 RX ADMIN — STANDARDIZED SENNA CONCENTRATE AND DOCUSATE SODIUM 1 TABLET: 8.6; 5 TABLET, FILM COATED ORAL at 09:04

## 2017-04-20 NOTE — Clinical Note
Darron,  His ICD is functioning normally and responded to a VF event with successful defibrillation. No changes needed on the ICD settings.   Uche

## 2017-04-20 NOTE — ED PROVIDER NOTES
"SCRIBE #1 NOTE: I, Eusebia Carbajal, am scribing for, and in the presence of, Ant Deshpande MD. I have scribed the entire note.      History      Chief Complaint   Patient presents with    Loss of Consciousness     wife states pt passed out in car and is very weak and pale       Review of patient's allergies indicates:   Allergen Reactions    Codeine Other (See Comments)     Hallucination    Hydrocodone Other (See Comments)     Impairs vision; AMS    Percocet [oxycodone-acetaminophen] Other (See Comments)     Makes him go crazy      Doxycycline Rash and Other (See Comments)     Vision impairment  Other reaction(s): Unknown    Levaquin [levofloxacin] Rash     Blisters    Xanax [alprazolam] Palpitations     pychosis         HPI   HPI    4/20/2017, 2:38 PM   History obtained from the wife      History of Present Illness: Quinn Hutchins is a 71 y.o. male patient who presents to the Emergency Department for LOC which onset suddenly PTA. Per wife, pt "passed out" in the car. Sx have been episodic and moderate in severity. No modifying factors noted. Wife states that pt became very weak and SOB following syncopal episode.  No associated sx included. Pt denies any fever, chills, CP, diaphoresis, leg swelling, n/v,  HA, dizziness, or focal weakness/numbness. Pt is a dialysis pt. No further complaints at this time.        Arrival mode: Personal vehicle      PCP: Jameel Almaguer MD       Past Medical History:  Past Medical History:   Diagnosis Date    A-V fistula     RIGHT UPPER ARM    Anemia in chronic kidney disease 8/16/2012    Anticoagulant long-term use     Arthritis     Atrial flutter     Cancer     RIGHT RENAL CELL; SKIN CA    Chronic ischemic heart disease 8/20/2013    Chronic systolic congestive heart failure 4/12/2016    Coronary artery disease 8/16/2012    Depression     Dialysis patient     M-W-F    Elbow fracture     Encounter for blood transfusion     ESRD (end stage renal disease) on " "dialysis 8/16/2012    Fractured coccyx     Gout, unspecified 8/16/2012    Heel bone fracture     Hypertension 8/20/2013    Hypertension, renal 8/16/2012    Ischemic cardiomyopathy 8/20/2013    Kidney stones     Macular degeneration     Mixed hyperlipidemia 8/20/2013    Myocardial infarction     Neuromuscular disorder     JEROME (obstructive sleep apnea) 4/12/2016    JEROME on CPAP     Paroxysmal atrial flutter 5/12/2016    Personal history of skin cancer     Rheumatoid arthritis 8/16/2012    Secondary hyperparathyroidism of renal origin 8/16/2012       Past Surgical History:  Past Surgical History:   Procedure Laterality Date    AV FISTULA PLACEMENT      REVISION X2    CARDIAC CATHETERIZATION      JOINT REPLACEMENT Right     hip    PARTIAL NEPHRECTOMY Right 2008    for renal cell cancer - "stage 1" per patient.    SKIN BIOPSY      TONSILLECTOMY           Family History:  Family History   Problem Relation Age of Onset    Hypertension Mother     Kidney disease Mother     Heart disease Mother     Parkinsonism Father     Cancer Brother      testicular cancer    Hyperlipidemia Brother     Hypertension Brother     Kidney disease Maternal Aunt        Social History:  Social History     Social History Main Topics    Smoking status: Never Smoker    Smokeless tobacco: Never Used    Alcohol use No      Comment: Drank only while serving in [employer]    Drug use: No    Sexual activity: Yes     Partners: Female       ROS   Review of Systems   Constitutional: Negative for chills, diaphoresis and fever.        (+) generalized weakness   HENT: Negative for sore throat.    Respiratory: Positive for shortness of breath.    Cardiovascular: Negative for chest pain and leg swelling.   Gastrointestinal: Negative for abdominal pain, blood in stool, constipation, diarrhea, nausea and vomiting.   Genitourinary: Negative for dysuria.   Musculoskeletal: Negative for back pain.   Skin: Negative for rash. "   Neurological: Positive for syncope. Negative for dizziness, weakness, light-headedness, numbness and headaches.   Hematological: Does not bruise/bleed easily.       Physical Exam    Initial Vitals   BP Pulse Resp Temp SpO2   04/20/17 1438 04/20/17 1438 04/20/17 1433 04/20/17 1433 04/20/17 1438   116/52 70 22 100.1 °F (37.8 °C) 93 %      Physical Exam  Nursing Notes and Vital Signs Reviewed.  Constitutional: Patient is in no acute distress. Awake and alert. Elderly.   Head: Atraumatic. Normocephalic.  Eyes: PERRL. EOM intact. Conjunctivae are not pale. No scleral icterus.  ENT: Mucous membranes are moist. Oropharynx is clear and symmetric.    Neck: Supple. Full ROM. No lymphadenopathy.  Cardiovascular: Regular rate. Regular rhythm. No murmurs, rubs, or gallops. Distal pulses are 2+ and symmetric.  Pulmonary/Chest: No respiratory distress. Clear to auscultation bilaterally. No wheezing, rales, or rhonchi.  Abdominal: Soft and non-distended.  There is no tenderness.  No rebound, guarding, or rigidity.   Musculoskeletal: Moves all extremities. No obvious deformities. No edema. No calf tenderness.  Skin: Warm and dry. Twin cath to left neck. Surgical changes to the LUE with a wound that is clean, dry, and intact.   Neurological:  Alert, awake, and appropriate.  Normal speech.  No acute focal neurological deficits are appreciated.  Psychiatric: Normal affect. Good eye contact. Appropriate in content.    ED Course    Procedures  ED Vital Signs:  Vitals:    04/20/17 1433 04/20/17 1438 04/20/17 1442 04/20/17 1628   BP:  (!) 116/52  (!) 116/50   Pulse:  70 68 66   Resp: (!) 22 (!) 22  (!) 22   Temp: 100.1 °F (37.8 °C)      TempSrc: Oral      SpO2:  (!) 93%  100%       Abnormal Lab Results:  Labs Reviewed   CBC W/ AUTO DIFFERENTIAL - Abnormal; Notable for the following:        Result Value    RBC 2.40 (*)     Hemoglobin 7.6 (*)     Hematocrit 23.0 (*)     MCH 31.7 (*)     RDW 16.4 (*)     Platelets 147 (*)     Gran # 10.7  (*)     Lymph # 0.3 (*)     Mono # 1.1 (*)     Gran% 88.9 (*)     Lymph% 2.6 (*)     Platelet Estimate Clumped (*)     All other components within normal limits   COMPREHENSIVE METABOLIC PANEL - Abnormal; Notable for the following:     BUN, Bld 33 (*)     Creatinine 4.9 (*)     Albumin 2.3 (*)      (*)     ALT 55 (*)     eGFR if  13 (*)     eGFR if non  11 (*)     All other components within normal limits   B-TYPE NATRIURETIC PEPTIDE - Abnormal; Notable for the following:     BNP >4900 (*)     All other components within normal limits   TROPONIN I - Abnormal; Notable for the following:     Troponin I 0.657 (*)     All other components within normal limits   CULTURE, BLOOD   CULTURE, BLOOD   CK   URINALYSIS   IRON AND TIBC   VITAMIN B12   FOLATE   FERRITIN   TYPE & SCREEN   PREPARE RBC SOFT        All Lab Results:  Results for orders placed or performed during the hospital encounter of 04/20/17   CBC auto differential   Result Value Ref Range    WBC 12.16 3.90 - 12.70 K/uL    RBC 2.40 (L) 4.60 - 6.20 M/uL    Hemoglobin 7.6 (L) 14.0 - 18.0 g/dL    Hematocrit 23.0 (L) 40.0 - 54.0 %    MCV 96 82 - 98 fL    MCH 31.7 (H) 27.0 - 31.0 pg    MCHC 33.0 32.0 - 36.0 %    RDW 16.4 (H) 11.5 - 14.5 %    Platelets 147 (L) 150 - 350 K/uL    MPV 10.5 9.2 - 12.9 fL    Gran # 10.7 (H) 1.8 - 7.7 K/uL    Lymph # 0.3 (L) 1.0 - 4.8 K/uL    Mono # 1.1 (H) 0.3 - 1.0 K/uL    Eos # 0.0 0.0 - 0.5 K/uL    Baso # 0.01 0.00 - 0.20 K/uL    Gran% 88.9 (H) 38.0 - 73.0 %    Lymph% 2.6 (L) 18.0 - 48.0 %    Mono% 9.1 4.0 - 15.0 %    Eosinophil% 0.0 0.0 - 8.0 %    Basophil% 0.1 0.0 - 1.9 %    Platelet Estimate Clumped (A)     Large/Giant Platelets Present     Differential Method Automated    Comprehensive metabolic panel   Result Value Ref Range    Sodium 137 136 - 145 mmol/L    Potassium 3.9 3.5 - 5.1 mmol/L    Chloride 97 95 - 110 mmol/L    CO2 25 23 - 29 mmol/L    Glucose 80 70 - 110 mg/dL    BUN, Bld 33 (H) 8 -  23 mg/dL    Creatinine 4.9 (H) 0.5 - 1.4 mg/dL    Calcium 9.5 8.7 - 10.5 mg/dL    Total Protein 6.2 6.0 - 8.4 g/dL    Albumin 2.3 (L) 3.5 - 5.2 g/dL    Total Bilirubin 0.7 0.1 - 1.0 mg/dL    Alkaline Phosphatase 119 55 - 135 U/L     (H) 10 - 40 U/L    ALT 55 (H) 10 - 44 U/L    Anion Gap 15 8 - 16 mmol/L    eGFR if African American 13 (A) >60 mL/min/1.73 m^2    eGFR if non African American 11 (A) >60 mL/min/1.73 m^2   Brain natriuretic peptide   Result Value Ref Range    BNP >4900 (H) 0 - 99 pg/mL   CK   Result Value Ref Range     20 - 200 U/L   Troponin I   Result Value Ref Range    Troponin I 0.657 (H) 0.000 - 0.026 ng/mL         Imaging Results:  Imaging Results         CT Head Without Contrast (Final result) Result time:  04/20/17 15:28:32    Final result by David Gee MD (04/20/17 15:28:32)    Impression:      Mild generalized atrophy with white matter degeneration.  Intracranial vascular calcification.  No acute findings.        All CT scans at this facility use dose modulation, iterative reconstruction and/or weight based dosing when appropriate to reduce radiation dose to as low as reasonably achievable.       Electronically signed by: DAVID GEE MD  Date:     04/20/17  Time:    15:28     Narrative:    CT HEAD WITHOUT CONTRAST     History:  Syncope.  Dizziness.  TIA.    Technique:  Noncontrast CT of the brain. Comparison with 03/12/2017.    Findings:  The ventricles are dilated consistent with generalized atrophy. Associated age-related white matter degeneration is present.     No significant acute findings are noted.            X-Ray Chest AP Portable (Final result) Result time:  04/20/17 15:04:54    Final result by Sami Vergara MD (04/20/17 15:04:54)    Impression:      No significant change.  Findings most consistent with mild CHF.      Electronically signed by: SAMI VERGARA MD  Date:     04/20/17  Time:    15:04     Narrative:    EXAM: XR CHEST AP PORTABLE    CLINICAL  HISTORY: syncope.    COMPARISON STUDIES: March 12, 2017    FINDINGS:  Stable cardiomegaly.  Left chest pacer vs. AICD from inferior approach.  Dual-lumen left IJ unchanged.    Minimal vascular congestion without overt edema.  No pleural effusions evident.             The EKG #1 was ordered, reviewed, and independently interpreted by the ED provider.  Interpretation time: 14:33  Rate: 70 BPM  Rhythm: sinus rhythm with 1st degree AV block  Interpretation: Possible left atrial enlargement. LVH with QRS widening and repolarization abnormality. No STEMI.    The EKG #2 was ordered, reviewed, and independently interpreted by the ED provider.  Interpretation time: 14:38  Rate: 70 BPM  Rhythm: sinus rhythm with 1st degree AV block  Interpretation: Possible left atrial enlargement. LVH with QRS widening and repolarization abnormality. No STEMI.         The Emergency Provider reviewed the vital signs and test results, which are outlined above.    ED Discussion     3:53 PM: Re-evaluated pt. I have discussed test results, shared treatment plan, and the need for admission with patient and family at bedside. Pt and family express understanding at this time and agree with all information. All questions answered. Pt and family have no further questions or concerns at this time. Pt is ready for admit.    3:54 PM: Dr. Deshpande discussed the pt's case with Dr. Church (Nephrology) who recommends transfusing 1 unit. States that pt can be given another 1-2 units of blood during dialysis.    4:00 PM: Discussed need for a blood transfusion with pt and family. Answered all questions at this time. Pt/family understand and give consent for a blood transfusion.     4:12 PM: Discussed case with Aicha Treadwell NP (Hospital Medicine). Aicha agrees with current care and management of pt and accepts admission.   Admitting Service: Hospital medicine   Admitting Physician: Dr. Vega  Admit to: Telemetry     ED Medication(s):  Medications   0.9%  NaCl  infusion (for blood administration) (not administered)     Medical Decision Making    Medical Decision Making:   Clinical Tests:   Lab Tests: Ordered and Reviewed  Radiological Study: Ordered and Reviewed  Medical Tests: Ordered and Reviewed           Scribe Attestation:   Scribe #1: I performed the above scribed service and the documentation accurately describes the services I performed. I attest to the accuracy of the note.    Attending:   Physician Attestation Statement for Scribe #1: I, Ant Deshpande MD, personally performed the services described in this documentation, as scribed by Eusebia Carbajal, in my presence, and it is both accurate and complete.          Clinical Impression       ICD-10-CM ICD-9-CM   1. ESRD (end stage renal disease) N18.6 585.6   2. Syncope, unspecified syncope type R55 780.2   3. Anemia, unspecified type D64.9 285.9       Disposition:   Disposition: Admitted  Condition: Fair         Ant Deshpande MD  04/20/17 1715

## 2017-04-20 NOTE — CONSULTS
Quinn Hutchins is a 71 y.o. male for whom nephrology consult has been requested to evaluate and give opinion ESRD on HD, Anemia, AMS      HPI: Quinn Hutchins is a pleasant 71-year-old  gentleman's seen in consultation for above medical problems.  Patient has history of end-stage renal disease on hemodialysis, Monday Wednesday Friday, [facility] [place] care of Dr. Kaylie Reilly.  Today he was brought to the emergency room by his wife for generalized malaise and altered mental status.  On workup significant anemia with a hemoglobin of 7.6 noted.  His hemoglobin was about 12 g a month ago.  According to the patient's wife patient had a surgery on his left arm AV fistula, most likely clotted.  He has an IJ Vas-Cath.  Patient is not oriented and very sleepy during my exam.  He also has been having some gastrointestinal symptoms in the last few days.  He is due for dialysis tomorrow, blood cultures were ordered in the emergency room, he is also receiving 1 unit of packed RBC.  Mild elevation of troponin I noted.    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); Chronic systolic congestive heart failure (4/12/2016); Coronary artery disease (8/16/2012); Depression; Dialysis patient; Elbow fracture; Encounter for blood transfusion; ESRD (end stage renal disease) on dialysis (8/16/2012); Fractured coccyx; Gout, unspecified (8/16/2012); Heel bone fracture; Hypertension (8/20/2013); Hypertension, renal (8/16/2012); Ischemic cardiomyopathy (8/20/2013); Kidney stones; Macular degeneration; Mixed hyperlipidemia (8/20/2013); Myocardial infarction; Neuromuscular disorder; JEROME (obstructive sleep apnea) (4/12/2016); JEROME on CPAP; Paroxysmal atrial flutter (5/12/2016); Personal history of skin cancer; Rheumatoid arthritis (8/16/2012); and  Secondary hyperparathyroidism of renal origin (8/16/2012).    PAST SURGICAL HISTORY:  He  has a past surgical history that includes Partial nephrectomy (Right, 2008); Tonsillectomy; AV fistula placement; Cardiac catheterization; Joint replacement (Right); and Skin biopsy.    SOCIAL HISTORY:  He  reports that he has never smoked. He has never used smokeless tobacco. He reports that he does not drink alcohol or use illicit drugs.    FAMILY MEDICAL HISTORY:  His family history includes Cancer in his brother; Heart disease in his mother; Hyperlipidemia in his brother; Hypertension in his brother and mother; Kidney disease in his maternal aunt and mother; Parkinsonism in his father.    Review of patient's allergies indicates:   Allergen Reactions    Codeine Other (See Comments)     Hallucination    Hydrocodone Other (See Comments)     Impairs vision; AMS    Percocet [oxycodone-acetaminophen] Other (See Comments)     Makes him go crazy      Doxycycline Rash and Other (See Comments)     Vision impairment  Other reaction(s): Unknown    Levaquin [levofloxacin] Rash     Blisters    Xanax [alprazolam] Palpitations     pychosis             Prior to Admission medications    Medication Sig Start Date End Date Taking? Authorizing Provider   acetaminophen (TYLENOL) 500 MG tablet Take 500 mg by mouth every 6 (six) hours as needed for Pain.   Yes Historical Provider, MD   acitretin (SORIATANE) 10 MG capsule Take 10 mg by mouth every evening.  4/30/15  Yes Historical Provider, MD   amiodarone (PACERONE) 200 MG Tab Take 200 mg by mouth 2 (two) times daily. 4/5/17  Yes Historical Provider, MD   aspirin (ECOTRIN) 81 MG EC tablet Take 81 mg by mouth once daily.   Yes Historical Provider, MD   atorvastatin (LIPITOR) 40 MG tablet TAKE 1 TABLET BY MOUTH EVERY EVENING 7/5/16  Yes Rowdy Sosa MD   carvedilol (COREG) 12.5 MG tablet Take 1 tablet by mouth 2 (two) times daily. 4/5/17  Yes Historical Provider, MD   cetirizine (ZYRTEC) 10 MG  tablet Take 10 mg by mouth once daily.   Yes Historical Provider, MD   cloNIDine (CATAPRES) 0.3 MG tablet Take 0.3 mg by mouth as needed.   Yes Historical Provider, MD   clopidogrel (PLAVIX) 75 mg tablet Take 75 mg by mouth once daily.   Yes Historical Provider, MD   docusate sodium (COLACE) 100 MG capsule Take 1 capsule (100 mg total) by mouth 3 (three) times daily as needed for Constipation. 5/16/16  Yes Sherry Treadwell NP   fluticasone (FLONASE) 50 mcg/actuation nasal spray 2 sprays by Each Nare route once daily. 4/18/17 5/18/17 Yes Jameel Almaguer MD   isosorbide mononitrate (IMDUR) 30 MG 24 hr tablet Take 1 tablet (30 mg total) by mouth once daily. 12/30/16 12/30/17 Yes Darron Anne MD   mupirocin (BACTROBAN) 2 % ointment Apply to wound infection 2 times a week for 7 days  Patient taking differently: Apply topically as needed. Apply to wound infection 2 times a week for 7 days 3/29/16  Yes Jameel Almaguer MD   nitroGLYCERIN (NITROSTAT) 0.4 MG SL tablet Place 1 tablet (0.4 mg total) under the tongue every 5 (five) minutes as needed for Chest pain. 4/25/16  Yes Reji Ramirez NP   ondansetron (ZOFRAN-ODT) 4 MG TbDL Take 2 tablets (8 mg total) by mouth every 8 (eight) hours as needed. 1/28/17  Yes Aline Uriostegui NP   pantoprazole (PROTONIX) 40 MG tablet TAKE 1 TABLET (40 MG TOTAL) BY MOUTH ONCE DAILY. 3/27/17  Yes Darron Anne MD   predniSONE (DELTASONE) 5 MG tablet TAKE 1 TABLET(S) ORAL (BY MOUTH) EVERY DAY 9/23/16  Yes Mary Ariza NP   pyridoxine (VITAMIN B-6) 200 mg Tab Take 1 tablet by mouth every evening.    Yes Historical Provider, MD   quetiapine (SEROQUEL) 25 MG Tab Take 1 tablet (25 mg total) by mouth every evening. Prn sleep 4/18/17  Yes Jameel Almaguer MD   RANEXA 500 mg Tb12 TAKE 1 TABLET TWICE A DAY 12/12/16  Yes Darron Anne MD   sertraline (ZOLOFT) 50 MG tablet Take 50 mg by mouth once daily. 4/5/17  Yes Historical Provider, MD   sevelamer carbonate (RENVELA) 800 mg Tab Take 1  tablet (800 mg total) by mouth 3 (three) times daily with meals. 5/16/16 5/16/17 Yes Sherry Treadwell NP   thiamine 100 MG tablet Take 1 tablet (100 mg total) by mouth once daily. 5/16/16  Yes Sherry Treadwell NP   tramadol (ULTRAM) 50 mg tablet Take 1 tablet (50 mg total) by mouth every 6 (six) hours as needed for Pain. 3/16/17  Yes Reny Worley NP   loratadine (CLARITIN) 10 mg tablet Take 10 mg by mouth once daily.  4/20/17  Historical Provider, MD        REVIEW OF SYSTEMS: ROS limited due to AMS ,     No intake or output data in the 24 hours ending 04/20/17 1717    PHYSICAL EXAM:   oral temperature is 100.1 °F (37.8 °C). His blood pressure is 116/50 (abnormal) and his pulse is 66. His respiration is 22 (abnormal) and oxygen saturation is 100%.   Gen: WDWN male in no apparent distress  Skin: No rashes or ulcers , multiple healed scabs on both hands   Eyes: Normal conjunctiva and lids,   ENT: Normal hearing with no oropharyngeal lesions  Neck: No JVD, IJ vascath   Chest: Clear with no rales, rhonchi, wheezing with normal effort  CV: Regular with no murmurs, gallops or rubs  Abd: Soft, nontender, no distension, positive bowel sounds  Ext: No cyanosis, clubbing or edema  Neuro : limited exam ,       Recent Labs  Lab 04/20/17  1500      K 3.9   CL 97   CO2 25   BUN 33*   CREATININE 4.9*   CALCIUM 9.5       Recent Labs  Lab 04/20/17  1500   WBC 12.16   HGB 7.6*   HCT 23.0*   *       Lab Results   Component Value Date    .0 (H) 10/26/2016    CALCIUM 9.5 04/20/2017    PHOS 4.2 01/27/2017       Lab Results   Component Value Date    ALBUMIN 2.3 (L) 04/20/2017       Lab Results   Component Value Date    ALT 55 (H) 04/20/2017     (H) 04/20/2017    ALKPHOS 119 04/20/2017    BILITOT 0.7 04/20/2017         Recent Labs  Lab 04/20/17  1500      TROPONINI 0.657*         IMPRESSION AND RECOMMENDATIONS: 81-year-old  gentleman seen in consultation for following medical  problems    1.  End-stage renal disease on hemodialysis - Monday Wednesday Friday schedule, will plan hemodialysis in the morning    2.  Anemia - multifactorial, he had a surgical procedure about 2 weeks ago.  Hemoglobin is 7.6 g, he will receive one unit of packed RBC today and two on hemodialysis tomorrow.    3. AMS : Etiology unclear, check blood cultures as patient currently has a Vas-Cath.    4.  Secondary hyperparathyroidism - renal diet, resume home binders.    5.  Coronary artery disease - need to rule out acute coronary syndrome.    6.  Hypertension - resume home medication with holding per meters.    Will continue to monitor. Total time spent 70 minutes including time needed to review the records,  patient  evaluation, documentation, face-to-face discussion with the patient's wife, ER staff ,   more than 50% of the time was spent on coordination of care and counseling.     Fermin Church MD

## 2017-04-20 NOTE — IP AVS SNAPSHOT
[address] [place] [address]           Patient Discharge Instructions   Our goal is to set you up for success. This packet includes information on your condition, medications, and your home care.  It will help you care for yourself to prevent having to return to the hospital.     Please ask your nurse if you have any questions.      There are many details to remember when preparing to leave the hospital. Here is what you will need to do:    1. Take your medicine. If you are prescribed medications, review your Medication List on the following pages. You may have new medications to  at the pharmacy and others that you'll need to stop taking. Review the instructions for how and when to take your medications. Talk with your doctor or nurses if you are unsure of what to do.     2. Go to your follow-up appointments. Specific follow-up information is listed in the following pages. Your may be contacted by a nurse or clinical provider about future appointments. Be sure we have all of the phone numbers to reach you. Please contact your provider's office if you are unable to make an appointment.     3. Watch for warning signs. Your doctor or nurse will give you detailed warning signs to watch for and when to call for assistance. These instructions may also include educational information about your condition. If you experience any of warning signs to your health, call your doctor.               ** Verify the list of medication(s) below is accurate and up to date. Carry this with you in case of emergency. If your medications have changed, please notify your healthcare provider.             Medication List      START taking these medications        Additional Info                      senna-docusate 8.6-50 mg 8.6-50 mg per tablet   Commonly known as:  PERICOLACE   Refills:  0   Dose:  1 tablet    Last time this was given:  1 tablet on 4/25/2017  9:59 AM   Instructions:   Take 1 tablet by mouth 2 (two) times daily.     Begin Date    AM    Noon    PM    Bedtime         CHANGE how you take these medications        Additional Info                      mupirocin 2 % ointment   Commonly known as:  BACTROBAN   Quantity:  22 g   Refills:  0   What changed:    - how to take this  - when to take this  - reasons to take this  - additional instructions    Instructions:  Apply to wound infection 2 times a week for 7 days     Begin Date    AM    Noon    PM    Bedtime         CONTINUE taking these medications        Additional Info                      acetaminophen 500 MG tablet   Commonly known as:  TYLENOL   Refills:  0   Dose:  500 mg    Instructions:  Take 500 mg by mouth every 6 (six) hours as needed for Pain.     Begin Date    AM    Noon    PM    Bedtime       acitretin 10 MG capsule   Commonly known as:  SORIATANE   Refills:  5   Dose:  10 mg    Instructions:  Take 10 mg by mouth every evening.     Begin Date    AM    Noon    PM    Bedtime       amiodarone 200 MG Tab   Commonly known as:  PACERONE   Refills:  0   Dose:  200 mg    Last time this was given:  200 mg on 4/25/2017  9:59 AM   Instructions:  Take 200 mg by mouth 2 (two) times daily.     Begin Date    AM    Noon    PM    Bedtime       aspirin 81 MG EC tablet   Commonly known as:  ECOTRIN   Refills:  0   Dose:  81 mg    Instructions:  Take 81 mg by mouth once daily.     Begin Date    AM    Noon    PM    Bedtime       atorvastatin 40 MG tablet   Commonly known as:  LIPITOR   Quantity:  30 tablet   Refills:  3    Instructions:  TAKE 1 TABLET BY MOUTH EVERY EVENING     Begin Date    AM    Noon    PM    Bedtime       carvedilol 12.5 MG tablet   Commonly known as:  COREG   Refills:  0   Dose:  1 tablet    Last time this was given:  12.5 mg on 4/25/2017  9:59 AM   Instructions:  Take 1 tablet by mouth 2 (two) times daily.     Begin Date    AM    Noon    PM    Bedtime       cetirizine 10 MG tablet   Commonly known as:  ZYRTEC   Refills:   0   Dose:  10 mg    Instructions:  Take 10 mg by mouth once daily.     Begin Date    AM    Noon    PM    Bedtime       clopidogrel 75 mg tablet   Commonly known as:  PLAVIX   Refills:  0   Dose:  75 mg    Last time this was given:  75 mg on 4/24/2017  8:21 AM   Instructions:  Take 75 mg by mouth once daily.     Begin Date    AM    Noon    PM    Bedtime       docusate sodium 100 MG capsule   Commonly known as:  COLACE   Refills:  0   Dose:  100 mg    Instructions:  Take 1 capsule (100 mg total) by mouth 3 (three) times daily as needed for Constipation.     Begin Date    AM    Noon    PM    Bedtime       fluticasone 50 mcg/actuation nasal spray   Commonly known as:  FLONASE   Quantity:  16 g   Refills:  0   Dose:  2 spray    Instructions:  2 sprays by Each Nare route once daily.     Begin Date    AM    Noon    PM    Bedtime       isosorbide mononitrate 30 MG 24 hr tablet   Commonly known as:  IMDUR   Quantity:  30 tablet   Refills:  11   Dose:  30 mg    Last time this was given:  30 mg on 4/25/2017  9:59 AM   Instructions:  Take 1 tablet (30 mg total) by mouth once daily.     Begin Date    AM    Noon    PM    Bedtime       nitroGLYCERIN 0.4 MG SL tablet   Commonly known as:  NITROSTAT   Quantity:  30 tablet   Refills:  0   Dose:  0.4 mg    Instructions:  Place 1 tablet (0.4 mg total) under the tongue every 5 (five) minutes as needed for Chest pain.     Begin Date    AM    Noon    PM    Bedtime       ondansetron 4 MG Tbdl   Commonly known as:  ZOFRAN-ODT   Quantity:  20 tablet   Refills:  0   Dose:  8 mg    Instructions:  Take 2 tablets (8 mg total) by mouth every 8 (eight) hours as needed.     Begin Date    AM    Noon    PM    Bedtime       pantoprazole 40 MG tablet   Commonly known as:  PROTONIX   Quantity:  30 tablet   Refills:  11    Last time this was given:  40 mg on 4/25/2017  9:59 AM   Instructions:  TAKE 1 TABLET (40 MG TOTAL) BY MOUTH ONCE DAILY.     Begin Date    AM    Noon    PM    Bedtime       predniSONE  5 MG tablet   Commonly known as:  DELTASONE   Quantity:  100 tablet   Refills:  1    Instructions:  TAKE 1 TABLET(S) ORAL (BY MOUTH) EVERY DAY     Begin Date    AM    Noon    PM    Bedtime       quetiapine 25 MG Tab   Commonly known as:  SEROQUEL   Quantity:  30 tablet   Refills:  0   Dose:  25 mg    Instructions:  Take 1 tablet (25 mg total) by mouth every evening. Prn sleep     Begin Date    AM    Noon    PM    Bedtime       RANEXA 500 MG Tb12   Quantity:  60 tablet   Refills:  12   Generic drug:  ranolazine    Last time this was given:  500 mg on 4/25/2017  9:59 AM   Instructions:  TAKE 1 TABLET TWICE A DAY     Begin Date    AM    Noon    PM    Bedtime       sertraline 50 MG tablet   Commonly known as:  ZOLOFT   Refills:  0   Dose:  50 mg    Last time this was given:  50 mg on 4/25/2017  9:59 AM   Instructions:  Take 50 mg by mouth once daily.     Begin Date    AM    Noon    PM    Bedtime       sevelamer carbonate 800 mg Tab   Commonly known as:  RENVELA   Quantity:  240 tablet   Refills:  1   Dose:  800 mg    Last time this was given:  800 mg on 4/24/2017  5:15 PM   Instructions:  Take 1 tablet (800 mg total) by mouth 3 (three) times daily with meals.     Begin Date    AM    Noon    PM    Bedtime       thiamine 100 MG tablet   Quantity:  30 tablet   Refills:  11   Dose:  100 mg    Instructions:  Take 1 tablet (100 mg total) by mouth once daily.     Begin Date    AM    Noon    PM    Bedtime       tramadol 50 mg tablet   Commonly known as:  ULTRAM   Quantity:  20 tablet   Refills:  0   Dose:  50 mg    Last time this was given:  50 mg on 4/24/2017 12:16 AM   Instructions:  Take 1 tablet (50 mg total) by mouth every 6 (six) hours as needed for Pain.     Begin Date    AM    Noon    PM    Bedtime       VITAMIN B-6 200 mg Tab   Refills:  0   Dose:  1 tablet   Generic drug:  pyridoxine (vitamin B6)    Instructions:  Take 1 tablet by mouth every evening.     Begin Date    AM    Noon    PM    Bedtime         STOP taking  these medications     cloNIDine 0.3 MG tablet   Commonly known as:  CATAPRES            Where to Get Your Medications      You can get these medications from any pharmacy     You don't need a prescription for these medications     senna-docusate 8.6-50 mg 8.6-50 mg per tablet                  Please bring to all follow up appointments:    1. A copy of your discharge instructions.  2. All medicines you are currently taking in their original bottles.  3. Identification and insurance card.    Please arrive 15 minutes ahead of scheduled appointment time.    Please call 24 hours in advance if you must reschedule your appointment and/or time.        Your Scheduled Appointments     Jul 06, 2017  1:40 PM CDT   Established Patient Visit with MD Vimal Muir - Cardiology ([facility])    [address] [place] [id number] [phone]            Jul 13, 2017 11:10 AM CDT   Non-Fasting Lab with LABORATORY, VIMAL MAHAN   [facility] ([facility])    [address] [place] [id number] [phone]            Jul 18, 2017 11:30 AM CDT   Ct Abd Pel W Contrast with [facility] CT1 LIMIT 500 LBS   [facility] ([facility])    [address] [facility] [id number] [phone]            Jul 26, 2017  1:00 PM CDT   Established Patient Visit with MD Vimal Cisse IV - Urology ([facility])    [address] [place] [id number] [phone]              Follow-up Information     Follow up with [facility].    Specialty:  Hospice and Palliative Medicine    Why:  Hospice    Contact information:    [address] [address] [facility] [address] [phone]          Discharge Instructions     Future Orders    Activity as tolerated     Diet general     Questions:    Total calories:      Fat restriction, if any:      Protein restriction, if any:      Na restriction, if  "any:      Fluid restriction:      Additional restrictions:  Renal        Primary Diagnosis     Your primary diagnosis was:  Severe Sepsis      Admission Information     Date & Time Department CSN    4/20/2017  2:31 PM [facility] -  [id number]      Care Providers     Provider Role Specialty Primary office phone    Fermin Church MD Consulting Physician  Nephrology  [phone]    Baljeet Gamble MD Team Attending  Internal Medicine [phone]    Ramón De MD Consulting Physician  Cardiology [phone]      Your Vitals Were     BP Pulse Temp Resp Height Weight    108/58 (BP Location: Right arm, Patient Position: Lying, BP Method: Automatic) 82 98 °F (36.7 °C) (Axillary) 18 5' 10" (1.778 m) 76.8 kg (169 lb 5 oz)    SpO2 BMI             98% 24.29 kg/m2         Recent Lab Values     No lab values to display.      Pending Labs     Order Current Status    Blood culture Preliminary result    Blood culture, peripheral draw Preliminary result    Prepare RBC 1 Unit Preliminary result    Prepare RBC 2 Units; HD Preliminary result      Allergies as of 4/25/2017        Reactions    Codeine Other (See Comments)    Hallucination    Hydrocodone Other (See Comments)    Impairs vision; AMS    Percocet [Oxycodone-acetaminophen] Other (See Comments)    Makes him go crazy    Doxycycline Rash, Other (See Comments)    Vision impairment  Other reaction(s): Unknown    Levaquin [Levofloxacin] Rash    Blisters    Xanax [Alprazolam] Palpitations    pychosis       [facility]     [facility] Nurse Care Line - 24/7 Assistance  Unless otherwise directed by your provider, please contact [facility], our nurse care line that is available for 24/7 assistance.     Registered nurses in the [facility] provide clinical advisement, health education, appointment booking, and other advisory services.  Call for this free service at [phone].        Advance Directives     An advance directive is a " document which, in the event you are no longer able to make decisions for yourself, tells your healthcare team what kind of treatment you do or do not want to receive, or who you would like to make those decisions for you.  If you do not currently have an advance directive, [facility] encourages you to create one.  For more information call:  [phone]), [phone] ([phone]),  or log on to [email or web address].        Language Assistance Services     ATTENTION: Language assistance services are available, free of charge. Please call [phone].      ATENCIÓN: Si manju hernandez, tiene a koehler disposición servicios gratuitos de asistencia lingüística. Llame al [phone].     [facility] Ý: N?u b?n nói Ti?ng Vi?t, có các d?ch v? h? tr? ngôn ng? mi?n phí dành cho b?n. G?i s? [phone].        Blood Transfusion Reaction Signs and Symptoms     The blood you have received has been matched for you as carefully as possible. Most patients who receive a blood transfusion do not experience problems. However, there can be a delayed reaction that happens a few weeks after your blood transfusion. Contact your physician immediately if you experience any NEW SYMPTOMS listed below:     Fever greater than 100.4 degrees    Chills   Yellow color to your skin or eyes(Jaundice)   Back pain, chest pain, or pain at the infusion site   Weakness (more than usual)   Discomfort or uneasiness more than usual (Malaise)   Nausea or vomiting   Shortness of breath, wheezing, or coughing   Higher or lower blood pressure than normal   Skin rash, itching, skin redness, or localized swelling (example: hands or feet)   Urinating less than normal   Urine appears reddish or orange and is darker than normal      Remember that some these signs may already exist for you--such as having chronic back pain or high blood pressure. You only need to look for and report to your doctor any new occurrences since your blood  transfusion that are of concern.        Heart Failure Education       Heart Failure: Being Active  You have a condition called heart failure. Being active doesnt mean that you have to wear yourself out. Even a little movement each day helps to strengthen your heart. If you cant get out to exercise, you can do simple stretching and strengthening exercises at home. These are good ways to keep you well-conditioned and prevent you and your heart from becoming excessively weak.    Ideas to get you started  · Add a little movement to things you do now. Walk to mail letters. Park your car at the far end of the parking lot and walk to the store. Walk up a flight of stairs instead of taking the elevator.  · Choose activities you enjoy. You might walk, swim, or ride an exercise bike. Things like gardening and washing the car count, too. Other possibilities include: washing dishes, walking the dog, walking around the mall, and doing aerobic activities with friends.  · Join a group exercise program at a [facility] or [facility], a senior center, or a community center. Or look into a hospital cardiac rehabilitation program. Ask your doctor if you qualify.  Tips to keep you going  · Get up and get dressed each day. Go to a coffee shop and read a newspaper or go somewhere that you'll be in the presence of other active people. Youll feel more like being active.  · Make a plan. Choose one or more activities that you enjoy and that you can easily do. Then plan to do at least one each day. You might write your plan on a calendar.  · Go with a friend or a group if you like company. This can help you feel supported and stay motivated, too.  · Plan social events that you enjoy. This will keep you mentally engaged as well as physically motivated to do things you find pleasure in.  For your safety  · Talk with your healthcare provider before starting an exercise program.  · Exercise indoors when its too hot or too cold outside, or when the air  quality is poor. Try walking at a shopping mall.  · Wear socks and sturdy shoes to maintain your balance and prevent falls.  · Start slowly. Do a few minutes several times a day at first. Increase your time and speed little by little.  · Stop and rest whenever you feel tired or get short of breath.  · Dont push yourself on days when you dont feel well.  Date Last Reviewed: 3/20/2016  © 2653-9797 [employer]. [address] [place] [address]. All rights reserved. This information is not intended as a substitute for professional medical care. Always follow your healthcare professional's instructions.              Heart Failure: Evaluating Your Heart  You have a condition called heart failure. To evaluate your condition, your doctor will examine you, ask questions, and do some tests. Along with looking for signs of heart failure, the doctor looks for any other health problems that may have led to heart failure. The results of your evaluation will help your doctor form a treatment plan.  Health history and physical exam  Your visit will start with a health history. Tell the doctor about any symptoms youve noticed and about all medicines you take. Then youll have a physical exam. This includes listening to your heartbeat and breathing. Youll also be checked for swelling (edema) in your legs and neck. When you have fluid buildup or fluid in the lungs, it may be called congestive heart failure.  Diagnosing heart failure     During an echocardiogram, sound waves bounce off the heart. These are converted into a picture on the screen.   The following may be done to help your doctor form a diagnosis:  · X-rays show the size and shape of your heart. These pictures can also show fluid in your lungs.  · An electrocardiogram (ECG or EKG) shows the pattern of your heartbeat. Small pads (electrodes) are placed on your chest, arms, and legs. Wires connect the pads to the ECG machine, which records your  hearts electrical signals. This can give the doctor information about heart function.  · An echocardiogram uses ultrasound waves to show the structure and movement of your heart muscle. This shows how well the heart pumps. It also shows the thickness of the heart walls, and if the heart is enlarged. It is one of the most useful, non-invasive tests as it provides information about the heart's general function. This helps your doctor make treatment decisions.  · Lab tests evaluate small amounts of blood or urine for signs of problems. A BNP lab test can help diagnose and evaluate heart failure. BNP stands for B-type natriuretic peptide. The ventricles secrete more BNP when heart failure worsens. Lab tests can also provide information about metabolic dysfunction or heart dysfunction.  Your treatment plan  Based on the results of your evaluation and tests, your doctor will develop a treatment plan. This plan is designed to relieve some of your heart failure symptoms and help make you more comfortable. Your treatment plan may include:  · Medicine to help your heart work better and improve your quality of life  · Changes in what you eat and drink to help prevent fluid from backing up in your body  · Daily monitoring of your weight and heart failure symptoms to see how well your treatment plan is working  · Exercise to help you stay healthy  · Help with quitting smoking  · Emotional and psychological support to help adjust to the changes  · Referrals to other specialists to make sure you are being treated comprehensively  Date Last Reviewed: 3/21/2016  © 9066-9249 [employer]. [address] [place] [address]. All rights reserved. This information is not intended as a substitute for professional medical care. Always follow your healthcare professional's instructions.              Heart Failure: Making Changes to Your Diet  You have a condition called heart failure. When you have heart failure,  excess fluid is more likely to build up in your body because your heart isn't working well. This makes the heart work harder to pump blood. Fluid buildup causes symptoms such as shortness of breath and swelling (edema). This is often referred to as congestive heart failure or CHF. Controlling the amount of salt (sodium) you eat may help stop fluid from building up. Your doctor may also tell you to reduce the amount of fluid you drink.  Reading food labels    Your healthcare provider will tell you how much sodium you can eat each day. Read food labels to keep track. Keep in mind that certain foods are high in salt. These include canned, frozen, and processed foods. Check the amount of sodium in each serving. Watch out for high-sodium ingredients. These include MSG (monosodium glutamate), baking soda, and sodium phosphate.   Eating less salt  Give yourself time to get used to eating less salt. It may take a little while. Here are some tips to help:  · Take the saltshaker off the table. Replace it with salt-free herb mixes and spices.  · Eat fresh or plain frozen vegetables. These have much less salt than canned vegetables.  · Choose low-sodium snacks like sodium-free pretzels, crackers, or air-popped popcorn.  · Dont add salt to your food when youre cooking. Instead, season your foods with pepper, lemon, garlic, or onion.  · When you eat out, ask that your food be cooked without added salt.  · Avoid eating fried foods as these often have a great deal of salt.  If youre told to limit fluids  You may need to limit how much fluid you have to help prevent swelling. This includes anything that is liquid at room temperature, such as ice cream and soup. If your doctor tells you to limit fluid, try these tips:  · Measure drinks in a measuring cup before you drink them. This will help you meet daily goals.  · Chill drinks to make them more refreshing.  · Suck on frozen lemon wedges to quench thirst.  · Only drink when  youre thirsty.  · Chew sugarless gum or suck on hard candy to keep your mouth moist.  · Weigh yourself daily to know if your body's fluid content is rising.  My sodium goal  Your healthcare provider may give you a sodium goal to meet each day. This includes sodium found in food as well as salt that you add. My goal is to eat no more than ___________ mg of sodium per day.     When to call your doctor  Call your doctor right away if you have any symptoms of worsening heart failure. These can include:  · Sudden weight gain  · Increased swelling of your legs or ankles  · Trouble breathing when youre resting or at night  · Increase in the number of pillows you have to sleep on  · Chest pain, pressure, discomfort, or pain in the jaw, neck, or back   Date Last Reviewed: 3/21/2016  © 5136-0074 [employer]. [address] [place] [address]. All rights reserved. This information is not intended as a substitute for professional medical care. Always follow your healthcare professional's instructions.              Heart Failure: Medicines to Help Your Heart    You have a condition called heart failure (also known as congestive heart failure, or CHF). Your doctor will likely prescribe medicines for heart failure and any underlying health problems you have. Most heart failure patients take one or more types of medicinen. Your healthcare provider will work to find the combination of medicines that works best for you.  Heart failure medicines  Here are the most common heart failure medicines:  · ACE inhibitors lower blood pressure and decrease strain on the heart. This makes it easier for the heart to pump. Angiotensin receptor blockers have similar effects. These are prescribed for some patients instead of ACE inhibitors.  · Beta-blockers relieve stress on the heart. They also improve symptoms. They may also improve the heart's pumping action over time.  · Diuretics (also called water pills) help rid your  body of excess water. This can help rid your body of swelling (edema). Having less fluid to pump means your heart doesnt have to work as hard. Some diuretics make your body lose a mineral called potassium. Your doctor will tell you if you need to take supplements or eat more foods high in potassium.  · Digoxin helps your heart pump with more strength. This helps your heart pump more blood with each beat. So, more oxygen-rich blood travels to the rest of the body.  · Aldosterone antagonists help alter hormones and decrease strain on the heart.  · Hydralazine and nitrates are two separate medicines used together to treat heart failure. They may come in one combination pill. They lower blood pressure and decrease how hard the heart has to pump.  Medicines for related conditions  Controlling other heart problems helps keep heart failure under control, too. Depending on other heart problems you have, medicines may be prescribed to:  · Lower blood pressure (antihypertensives).  · Lower cholesterol levels (statins).  · Prevent blood clots (anticoagulants or aspirin).  · Keep the heartbeat steady (antiarrhythmics).  Date Last Reviewed: 3/5/2016  © 6280-8305 [employer]. [address] [place] [address]. All rights reserved. This information is not intended as a substitute for professional medical care. Always follow your healthcare professional's instructions.              Heart Failure: Procedures That May Help    The heart is a muscle that pumps oxygen-rich blood to all parts of the body. When you have heart failure, the heart is not able to pump as well as it should. Blood and fluid may back up into the lungs (congestive heart failure), and some parts of the body dont get enough oxygen-rich blood to work normally. These problems lead to the symptoms of heart failure.     Certain procedures may help the heart pump better in some cases of heart failure. Some procedures are done to treat health  problems that may have caused the heart failure such as coronary artery disease or heart rhythm problems. For more serious heart failure, other options are available.  Treating artery and valve problems  If you have coronary artery disease or valve disease, procedures may be done to improve blood flow. This helps the heart pump better, which can improve heart failure symptoms. First, your doctor may do a cardiac catheterization to help detect clogged blood vessels or valve damage. During this procedure, a  thin tube (catheter) in inserted into a blood vessel and guided to the heart. There a dye is injected and a special type of X-ray (angiogram) is taken of the blood vessels. Procedures to open a blocked artery or fix damaged valves can also be done using catheterization.  · Angioplasty uses a balloon-tipped instrument at the end of the catheter. The balloon is inflated to widen the narrowed artery. In many cases, a stent is expanded to further support the narrowed artery. A stent is a metal mesh tube.  · Valve surgery repairs or replacement of faulty valves can also be done during catheterization so blood can flow properly through the chambers of the heart.  Bypass surgery is another option to help treat blocked arteries. It uses a healthy blood vessel from elsewhere in the body. The healthy blood vessel is attached above and below the blocked area so that blood can flow around the blocked artery.  Treating heart rhythm problems  A device may be placed in the chest to help a weak heart maintain a healthy, heartbeat so the heart can pump more effectively:  · Pacemaker. A pacemaker is an implanted device that regulates your heartbeat electronically. It monitors your heart's rhythm and generates a painless electric impulse that helps the heart beat in a regular rhythm. A pacemaker is programmed to meet your specific heart rhythm needs.  · Biventricular pacing/cardiac resynchronization therapy. A type of pacemaker that  paces both pumping chambers of the heart at the same time to coordinate contractions and to improve the heart's function. Some people with heart failure are candidates for this therapy.  · Implantable cardioverter defibrillator. A device similar to a pacemaker that senses when the heart is beating too fast and delivers an electrical shock to convert the fast rhythm to a normal rhythm. This can be a life saving device.  In severe cases  In more serious cases of heart failure when other treatments no longer work, other options may include:  · Ventricular assist devices (VADs). These are mechanical devices used to take over the pumping function for one or both of the heart's ventricles, or pumping chambers. A VAD may be necessary when heart failure progresses to the point that medicines and other treatments no longer help. In some cases, a VAD may be used as a bridge to transplant.  · Heart transplant. This is replacing the diseased heart with a healthy one from a donor. This is an option for a few people who are very sick. A heart transplant is very serious and not an option for all patients. Your doctor can tell you more.  Date Last Reviewed: 3/20/2016  © 9727-3695 [employer]. [address] [place] [address]. All rights reserved. This information is not intended as a substitute for professional medical care. Always follow your healthcare professional's instructions.              Heart Failure: Tracking Your Weight  You have a condition called heart failure. When you have heart failure, a sudden weight gain or a steady rise in weight is a warning sign that your body is retaining too much water and salt. This could mean your heart failure is getting worse. If left untreated, it can cause problems for your lungs and result in shortness of breath. Weighing yourself each day is the best way to know if youre retaining water. If your weight goes up quickly, call your doctor. You will be given  instructions on how to get rid of the excess water. You will likely need medicines and to avoid salt. This will help your heart work better.  Call your doctor if you gain more than 2 pounds in 1 day, more than 5 pounds in 1 week, or whatever weight gain you were told to report by your doctor. This is often a sign of worsening heart failure and needs to be evaluated and treated. Your doctor will tell you what to do next.   Tips for weighing yourself    · Weigh yourself at the same time each morning, wearing the same clothes. Weigh yourself after urinating and before eating.  · Use the same scale each day. Make sure the numbers are easy to read. Put the scale on a flat, hard surface -- not on a rug or carpet.  · Do not stop weighing yourself. If you forget one day, weigh again the next morning.  How to use your weight chart  · Keep your weight chart near the scale. Write your weight on the chart as soon as you get off the scale.  · Fill in the month and the start date on the chart. Then write down your weight each day. Your chart will look like this:    · If you miss a day, leave the space blank. Weigh yourself the next day and write your weight in the next space.  · Take your weight chart with you when you go to see your doctor.  Date Last Reviewed: 3/20/2016  © 2573-1560 [email or web address]. [address] [place] [address], [place] [address]. All rights reserved. This information is not intended as a substitute for professional medical care. Always follow your healthcare professional's instructions.              Heart Failure: Warning Signs of a Flare-Up  You have a condition called heart failure. Once you have heart failure, flare-ups can happen. Below are signs that can mean your heart failure is getting worse. If you notice any of these warning signs, call your healthcare provider.  Swelling    · Your feet, ankles, or lower legs get puffier.  · You notice skin changes on your lower legs.  · Your shoes feel too  tight.  · Your clothes are tighter in the waist.  · You have trouble getting rings on or off your fingers.  Shortness of breath  · You have to breathe harder even when youre doing your normal activities or when youre resting.  · You are short of breath walking up stairs or even short distances.  · You wake up at night short of breath or coughing.  · You need to use more pillows or sit up to sleep.  · You wake up tired or restless.  Other warning signs  · You feel weaker, dizzy, or more tired.  · You have chest pain or changes in your heartbeat.  · You have a cough that wont go away.  · You cant remember things or dont feel like eating.  Tracking your weight  Gaining weight is often the first warning sign that heart failure is getting worse. Gaining even a few pounds can be a sign that your body is retaining excess water and salt. Weighing yourself each day in the morning after you urinate and before you eat, is the best way to know if you're retaining water. Get a scale that is easy to read and make sure you wear the same clothes and use the same scale every time you weigh. Your healthcare provider will show you how to track your weight. Call your doctor if you gain more than 2 pounds in 1 day, 5 pounds in 1 week, or whatever weight gain you were told to report by your doctor. This is often a sign of worsening heart failure and needs to be evaluated and treated before it compromises your breathing. Your doctor will tell you what to do next.    Date Last Reviewed: 3/15/2016  © 3380-7147 [employer]. [address] [place] [address], [place] [address]. All rights reserved. This information is not intended as a substitute for professional medical care. Always follow your healthcare professional's instructions.              Chronic [place] Disease Education              [facility] complies with applicable Federal civil rights laws and does not discriminate on the basis of race, color, national  origin, age, disability, or sex.

## 2017-04-20 NOTE — SUBJECTIVE & OBJECTIVE
"Past Medical History:   Diagnosis Date    A-V fistula     RIGHT UPPER ARM    Anemia in chronic kidney disease 8/16/2012    Anticoagulant long-term use     Arthritis     Atrial flutter     Cancer     RIGHT RENAL CELL; SKIN CA    Chronic ischemic heart disease 8/20/2013    Chronic systolic congestive heart failure 4/12/2016    Coronary artery disease 8/16/2012    Depression     Dialysis patient     M-W-F    Elbow fracture     Encounter for blood transfusion     ESRD (end stage renal disease) on dialysis 8/16/2012    Fractured coccyx     Gout, unspecified 8/16/2012    Heel bone fracture     Hypertension 8/20/2013    Hypertension, renal 8/16/2012    Ischemic cardiomyopathy 8/20/2013    Kidney stones     Macular degeneration     Mixed hyperlipidemia 8/20/2013    Myocardial infarction     Neuromuscular disorder     JEROME (obstructive sleep apnea) 4/12/2016    JEROME on CPAP     Paroxysmal atrial flutter 5/12/2016    Personal history of skin cancer     Rheumatoid arthritis 8/16/2012    Secondary hyperparathyroidism of renal origin 8/16/2012       Past Surgical History:   Procedure Laterality Date    AV FISTULA PLACEMENT      REVISION X2    CARDIAC CATHETERIZATION      JOINT REPLACEMENT Right     hip    PARTIAL NEPHRECTOMY Right 2008    for renal cell cancer - "stage 1" per patient.    SKIN BIOPSY      TONSILLECTOMY         Review of patient's allergies indicates:   Allergen Reactions    Codeine Other (See Comments)     Hallucination    Hydrocodone Other (See Comments)     Impairs vision; AMS    Percocet [oxycodone-acetaminophen] Other (See Comments)     Makes him go crazy      Doxycycline Rash and Other (See Comments)     Vision impairment  Other reaction(s): Unknown    Levaquin [levofloxacin] Rash     Blisters    Xanax [alprazolam] Palpitations     pychosis        Current Facility-Administered Medications on File Prior to Encounter   Medication    hyaluronate sodium, stabilized Syrg " 4 mL     Current Outpatient Prescriptions on File Prior to Encounter   Medication Sig    acetaminophen (TYLENOL) 500 MG tablet Take 500 mg by mouth every 6 (six) hours as needed for Pain.    acitretin (SORIATANE) 10 MG capsule Take 10 mg by mouth every evening.     amiodarone (PACERONE) 200 MG Tab Take 200 mg by mouth 2 (two) times daily.    aspirin (ECOTRIN) 81 MG EC tablet Take 81 mg by mouth once daily.    atorvastatin (LIPITOR) 40 MG tablet TAKE 1 TABLET BY MOUTH EVERY EVENING    carvedilol (COREG) 12.5 MG tablet Take 1 tablet by mouth 2 (two) times daily.    cetirizine (ZYRTEC) 10 MG tablet Take 10 mg by mouth once daily.    cloNIDine (CATAPRES) 0.3 MG tablet Take 0.3 mg by mouth as needed.    clopidogrel (PLAVIX) 75 mg tablet Take 75 mg by mouth once daily.    docusate sodium (COLACE) 100 MG capsule Take 1 capsule (100 mg total) by mouth 3 (three) times daily as needed for Constipation.    fluticasone (FLONASE) 50 mcg/actuation nasal spray 2 sprays by Each Nare route once daily.    isosorbide mononitrate (IMDUR) 30 MG 24 hr tablet Take 1 tablet (30 mg total) by mouth once daily.    mupirocin (BACTROBAN) 2 % ointment Apply to wound infection 2 times a week for 7 days (Patient taking differently: Apply topically as needed. Apply to wound infection 2 times a week for 7 days)    nitroGLYCERIN (NITROSTAT) 0.4 MG SL tablet Place 1 tablet (0.4 mg total) under the tongue every 5 (five) minutes as needed for Chest pain.    ondansetron (ZOFRAN-ODT) 4 MG TbDL Take 2 tablets (8 mg total) by mouth every 8 (eight) hours as needed.    pantoprazole (PROTONIX) 40 MG tablet TAKE 1 TABLET (40 MG TOTAL) BY MOUTH ONCE DAILY.    predniSONE (DELTASONE) 5 MG tablet TAKE 1 TABLET(S) ORAL (BY MOUTH) EVERY DAY    pyridoxine (VITAMIN B-6) 200 mg Tab Take 1 tablet by mouth every evening.     quetiapine (SEROQUEL) 25 MG Tab Take 1 tablet (25 mg total) by mouth every evening. Prn sleep    RANEXA 500 mg Tb12 TAKE 1 TABLET  TWICE A DAY    sertraline (ZOLOFT) 50 MG tablet Take 50 mg by mouth once daily.    sevelamer carbonate (RENVELA) 800 mg Tab Take 1 tablet (800 mg total) by mouth 3 (three) times daily with meals.    thiamine 100 MG tablet Take 1 tablet (100 mg total) by mouth once daily.    tramadol (ULTRAM) 50 mg tablet Take 1 tablet (50 mg total) by mouth every 6 (six) hours as needed for Pain.    [DISCONTINUED] loratadine (CLARITIN) 10 mg tablet Take 10 mg by mouth once daily.     Family History     Problem Relation (Age of Onset)    Cancer Brother    Heart disease Mother    Hyperlipidemia Brother    Hypertension Mother, Brother    Kidney disease Mother, Maternal Aunt    Parkinsonism Father        Social History Main Topics    Smoking status: Never Smoker    Smokeless tobacco: Never Used    Alcohol use No      Comment: Drank only while serving in [employer]    Drug use: No    Sexual activity: Yes     Partners: Female     Review of Systems   Constitutional: Positive for fatigue. Negative for chills, diaphoresis and fever.   HENT: Negative for hearing loss, mouth sores, sore throat, tinnitus and trouble swallowing.    Eyes: Negative for pain, discharge and redness.   Respiratory: Negative for apnea, cough, choking, chest tightness, shortness of breath, wheezing and stridor.    Cardiovascular: Negative for chest pain, palpitations and leg swelling.   Gastrointestinal: Positive for diarrhea. Negative for abdominal distention, abdominal pain, blood in stool, constipation, nausea, rectal pain and vomiting.   Endocrine: Negative for cold intolerance, heat intolerance, polydipsia, polyphagia and polyuria.   Genitourinary: Negative for difficulty urinating, dysuria, flank pain, frequency, hematuria and urgency.        ESRD on HD MWF   Musculoskeletal: Negative for arthralgias, back pain, gait problem, joint swelling, neck pain and neck stiffness.   Skin: Negative for color change, rash and wound.        Dry skin with Diffuse  scabbing noted to entire body. Incision to LUE KATHERINE with sutures/staples CDI + erythema   Allergic/Immunologic: Negative for food allergies.   Neurological: Positive for syncope and weakness. Negative for dizziness, tremors, seizures, speech difficulty, light-headedness and headaches.   Hematological: Negative for adenopathy. Does not bruise/bleed easily.   Psychiatric/Behavioral: Positive for confusion. Negative for agitation. The patient is not nervous/anxious.    All other systems reviewed and are negative.    Objective:     Vital Signs (Most Recent):  Temp: 100.1 °F (37.8 °C) (04/20/17 1433)  Pulse: 66 (04/20/17 1733)  Resp: (!) 22 (04/20/17 1733)  BP: 126/61 (04/20/17 1733)  SpO2: 99 % (04/20/17 1733) Vital Signs (24h Range):  Temp:  [100.1 °F (37.8 °C)] 100.1 °F (37.8 °C)  Pulse:  [66-70] 66  Resp:  [22] 22  SpO2:  [93 %-100 %] 99 %  BP: (100-126)/(40-61) 126/61        There is no height or weight on file to calculate BMI.    Physical Exam   Constitutional: He is oriented to person, place, and time. He appears well-developed and well-nourished. No distress.   HENT:   Head: Normocephalic and atraumatic.   Mouth/Throat: Oropharynx is clear and moist.   Eyes: Conjunctivae and EOM are normal. Pupils are equal, round, and reactive to light. Left eye exhibits no discharge.   Neck: Normal range of motion. Neck supple. No JVD present.   Left IJ Vas Cath site WNL with DSG CDI   Cardiovascular: Normal rate, regular rhythm and intact distal pulses.  Exam reveals no gallop and no friction rub.    Murmur heard.  Pulmonary/Chest: Effort normal and breath sounds normal. No stridor. No respiratory distress. He has no wheezes. He has no rales. He exhibits no tenderness.   Comfortable on 2L NC   Abdominal: Soft. Bowel sounds are normal. He exhibits no distension and no mass. There is no tenderness. There is no rebound and no guarding.   Musculoskeletal: Normal range of motion. He exhibits no edema or tenderness.   Neurological:  He is alert and oriented to person, place, and time. No cranial nerve deficit.   Falls asleep quickly.  MAEW. Follows all commands appropriately. Some confusion noted.  Poor historian.   Skin: Skin is warm and dry. No rash noted. He is not diaphoretic. No erythema.   Dry skin with difruse scabbing noted to entire body.  Incision to LUE shunt KATHERINE, sutures/staples intact.  + erythema/ecchymosis, no active drainage.  Left hip incision healed without s/s of infection   Psychiatric: He has a normal mood and affect. His behavior is normal. Judgment and thought content normal.   Nursing note and vitals reviewed.       Significant Labs:   CBC:   Recent Labs  Lab 04/20/17  1500   WBC 12.16   HGB 7.6*   HCT 23.0*   *     CMP:   Recent Labs  Lab 04/20/17  1500      K 3.9   CL 97   CO2 25   GLU 80   BUN 33*   CREATININE 4.9*   CALCIUM 9.5   PROT 6.2   ALBUMIN 2.3*   BILITOT 0.7   ALKPHOS 119   *   ALT 55*   ANIONGAP 15   EGFRNONAA 11*     Cardiac Markers:   Recent Labs  Lab 04/20/17  1500   BNP >4900*       Significant Imaging: I have reviewed all pertinent imaging results/findings within the past 24 hours.   Imaging Results         CT Head Without Contrast (Final result) Result time:  04/20/17 15:28:32    Final result by David Gee MD (04/20/17 15:28:32)    Impression:      Mild generalized atrophy with white matter degeneration.  Intracranial vascular calcification.  No acute findings.        All CT scans at this facility use dose modulation, iterative reconstruction and/or weight based dosing when appropriate to reduce radiation dose to as low as reasonably achievable.       Electronically signed by: DAVID GEE MD  Date:     04/20/17  Time:    15:28     Narrative:    CT HEAD WITHOUT CONTRAST     History:  Syncope.  Dizziness.  TIA.    Technique:  Noncontrast CT of the brain. Comparison with 03/12/2017.    Findings:  The ventricles are dilated consistent with generalized atrophy. Associated  age-related white matter degeneration is present.     No significant acute findings are noted.            X-Ray Chest AP Portable (Final result) Result time:  04/20/17 15:04:54    Final result by Taisha Vergara MD (04/20/17 15:04:54)    Impression:      No significant change.  Findings most consistent with mild CHF.      Electronically signed by: TAISHA VERGARA MD  Date:     04/20/17  Time:    15:04     Narrative:    EXAM: XR CHEST AP PORTABLE    CLINICAL HISTORY: syncope.    COMPARISON STUDIES: March 12, 2017    FINDINGS:  Stable cardiomegaly.  Left chest pacer vs. AICD from inferior approach.  Dual-lumen left IJ unchanged.    Minimal vascular congestion without overt edema.  No pleural effusions evident.
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
## 2017-04-21 PROBLEM — D72.829 LEUKOCYTOSIS: Status: ACTIVE | Noted: 2017-04-21

## 2017-04-21 PROBLEM — R65.20 SEVERE SEPSIS: Status: ACTIVE | Noted: 2017-04-21

## 2017-04-21 PROBLEM — A41.9 SEVERE SEPSIS: Status: ACTIVE | Noted: 2017-04-21

## 2017-04-21 LAB
ALBUMIN SERPL BCP-MCNC: 2.1 G/DL
ALBUMIN SERPL BCP-MCNC: 2.1 G/DL
ALP SERPL-CCNC: 115 U/L
ALT SERPL W/O P-5'-P-CCNC: 49 U/L
ANION GAP SERPL CALC-SCNC: 13 MMOL/L
AORTIC VALVE STENOSIS: ABNORMAL
AST SERPL-CCNC: 143 U/L
BASOPHILS # BLD AUTO: 0.01 K/UL
BASOPHILS NFR BLD: 0.1 %
BILIRUB DIRECT SERPL-MCNC: 0.5 MG/DL
BILIRUB SERPL-MCNC: 0.8 MG/DL
BLD PROD TYP BPU: NORMAL
BLD PROD TYP BPU: NORMAL
BLOOD UNIT EXPIRATION DATE: NORMAL
BLOOD UNIT EXPIRATION DATE: NORMAL
BLOOD UNIT TYPE CODE: 5100
BLOOD UNIT TYPE CODE: 5100
BLOOD UNIT TYPE: NORMAL
BLOOD UNIT TYPE: NORMAL
BUN SERPL-MCNC: 43 MG/DL
CALCIUM SERPL-MCNC: 9.3 MG/DL
CHLORIDE SERPL-SCNC: 96 MMOL/L
CHOLEST/HDLC SERPL: 6.9 {RATIO}
CO2 SERPL-SCNC: 28 MMOL/L
CODING SYSTEM: NORMAL
CODING SYSTEM: NORMAL
CREAT SERPL-MCNC: 5.9 MG/DL
DIFFERENTIAL METHOD: ABNORMAL
DISPENSE STATUS: NORMAL
DISPENSE STATUS: NORMAL
EOSINOPHIL # BLD AUTO: 0 K/UL
EOSINOPHIL NFR BLD: 0.2 %
ERYTHROCYTE [DISTWIDTH] IN BLOOD BY AUTOMATED COUNT: 17.6 %
EST. GFR  (AFRICAN AMERICAN): 10 ML/MIN/1.73 M^2
EST. GFR  (NON AFRICAN AMERICAN): 9 ML/MIN/1.73 M^2
ESTIMATED PA SYSTOLIC PRESSURE: 44.72
FERRITIN SERPL-MCNC: 4235 NG/ML
FOLATE SERPL-MCNC: 7.6 NG/ML
GLUCOSE SERPL-MCNC: 62 MG/DL
HCT VFR BLD AUTO: 23.7 %
HDL/CHOLESTEROL RATIO: 14.5 %
HDLC SERPL-MCNC: 11 MG/DL
HDLC SERPL-MCNC: 76 MG/DL
HGB BLD-MCNC: 10.1 G/DL
HGB BLD-MCNC: 8 G/DL
IRON SERPL-MCNC: 21 UG/DL
LACTATE SERPL-SCNC: 1.1 MMOL/L
LDLC SERPL CALC-MCNC: 44.4 MG/DL
LYMPHOCYTES # BLD AUTO: 0.7 K/UL
LYMPHOCYTES NFR BLD: 4.8 %
MCH RBC QN AUTO: 31.3 PG
MCHC RBC AUTO-ENTMCNC: 33.8 %
MCV RBC AUTO: 93 FL
MITRAL VALVE REGURGITATION: ABNORMAL
MONOCYTES # BLD AUTO: 0.9 K/UL
MONOCYTES NFR BLD: 6.2 %
NEUTROPHILS # BLD AUTO: 12.7 K/UL
NEUTROPHILS NFR BLD: 88.7 %
NONHDLC SERPL-MCNC: 65 MG/DL
NUM UNITS TRANS PACKED RBC: NORMAL
NUM UNITS TRANS PACKED RBC: NORMAL
PHOSPHATE SERPL-MCNC: 5.4 MG/DL
PLATELET # BLD AUTO: 157 K/UL
PMV BLD AUTO: 10 FL
POTASSIUM SERPL-SCNC: 3.4 MMOL/L
PROT SERPL-MCNC: 5.8 G/DL
RBC # BLD AUTO: 2.56 M/UL
RETIRED EF AND QEF - SEE NOTES: 25 (ref 55–65)
SATURATED IRON: 17 %
SODIUM SERPL-SCNC: 137 MMOL/L
TOTAL IRON BINDING CAPACITY: 123 UG/DL
TRANSFERRIN SERPL-MCNC: 83 MG/DL
TRICUSPID VALVE REGURGITATION: ABNORMAL
TRIGL SERPL-MCNC: 103 MG/DL
TROPONIN I SERPL DL<=0.01 NG/ML-MCNC: 0.63 NG/ML
TROPONIN I SERPL DL<=0.01 NG/ML-MCNC: 0.63 NG/ML
VIT B12 SERPL-MCNC: 424 PG/ML
WBC # BLD AUTO: 14.36 K/UL

## 2017-04-21 PROCEDURE — 80061 LIPID PANEL: CPT

## 2017-04-21 PROCEDURE — 85025 COMPLETE CBC W/AUTO DIFF WBC: CPT

## 2017-04-21 PROCEDURE — 86920 COMPATIBILITY TEST SPIN: CPT

## 2017-04-21 PROCEDURE — P9016 RBC LEUKOCYTES REDUCED: HCPCS

## 2017-04-21 PROCEDURE — 36415 COLL VENOUS BLD VENIPUNCTURE: CPT

## 2017-04-21 PROCEDURE — 80069 RENAL FUNCTION PANEL: CPT

## 2017-04-21 PROCEDURE — 63600175 PHARM REV CODE 636 W HCPCS: Performed by: INTERNAL MEDICINE

## 2017-04-21 PROCEDURE — 99233 SBSQ HOSP IP/OBS HIGH 50: CPT | Mod: ,,, | Performed by: INTERNAL MEDICINE

## 2017-04-21 PROCEDURE — 25000003 PHARM REV CODE 250: Performed by: INTERNAL MEDICINE

## 2017-04-21 PROCEDURE — 85018 HEMOGLOBIN: CPT

## 2017-04-21 PROCEDURE — 84484 ASSAY OF TROPONIN QUANT: CPT | Mod: 91

## 2017-04-21 PROCEDURE — 83605 ASSAY OF LACTIC ACID: CPT

## 2017-04-21 PROCEDURE — 93306 TTE W/DOPPLER COMPLETE: CPT | Mod: 26,,, | Performed by: INTERNAL MEDICINE

## 2017-04-21 PROCEDURE — 87077 CULTURE AEROBIC IDENTIFY: CPT

## 2017-04-21 PROCEDURE — 99900037 HC PT THERAPY SCREENING (STAT)

## 2017-04-21 PROCEDURE — 36430 TRANSFUSION BLD/BLD COMPNT: CPT

## 2017-04-21 PROCEDURE — 87186 SC STD MICRODIL/AGAR DIL: CPT

## 2017-04-21 PROCEDURE — 84075 ASSAY ALKALINE PHOSPHATASE: CPT

## 2017-04-21 PROCEDURE — 87040 BLOOD CULTURE FOR BACTERIA: CPT

## 2017-04-21 PROCEDURE — 84484 ASSAY OF TROPONIN QUANT: CPT

## 2017-04-21 PROCEDURE — 21400001 HC TELEMETRY ROOM

## 2017-04-21 PROCEDURE — 25000003 PHARM REV CODE 250: Performed by: NURSE PRACTITIONER

## 2017-04-21 PROCEDURE — 93306 TTE W/DOPPLER COMPLETE: CPT

## 2017-04-21 PROCEDURE — 80100016 HC MAINTENANCE HEMODIALYSIS

## 2017-04-21 RX ORDER — HEPARIN SODIUM 1000 [USP'U]/ML
2000 INJECTION, SOLUTION INTRAVENOUS; SUBCUTANEOUS ONCE
Status: DISCONTINUED | OUTPATIENT
Start: 2017-04-21 | End: 2017-04-23

## 2017-04-21 RX ORDER — HYDROCODONE BITARTRATE AND ACETAMINOPHEN 500; 5 MG/1; MG/1
TABLET ORAL
Status: DISCONTINUED | OUTPATIENT
Start: 2017-04-21 | End: 2017-04-25 | Stop reason: HOSPADM

## 2017-04-21 RX ORDER — SEVELAMER CARBONATE 800 MG/1
800 TABLET, FILM COATED ORAL
Status: DISCONTINUED | OUTPATIENT
Start: 2017-04-21 | End: 2017-04-25 | Stop reason: HOSPADM

## 2017-04-21 RX ORDER — ALBUMIN HUMAN 250 G/1000ML
12.5 SOLUTION INTRAVENOUS
Status: DISCONTINUED | OUTPATIENT
Start: 2017-04-21 | End: 2017-04-23

## 2017-04-21 RX ADMIN — ASPIRIN 81 MG CHEWABLE TABLET 81 MG: 81 TABLET CHEWABLE at 04:04

## 2017-04-21 RX ADMIN — STANDARDIZED SENNA CONCENTRATE AND DOCUSATE SODIUM 1 TABLET: 8.6; 5 TABLET, FILM COATED ORAL at 09:04

## 2017-04-21 RX ADMIN — AMIODARONE HYDROCHLORIDE 200 MG: 200 TABLET ORAL at 04:04

## 2017-04-21 RX ADMIN — VANCOMYCIN HYDROCHLORIDE 1500 MG: 100 INJECTION, POWDER, LYOPHILIZED, FOR SOLUTION INTRAVENOUS at 07:04

## 2017-04-21 RX ADMIN — CARVEDILOL 12.5 MG: 12.5 TABLET, FILM COATED ORAL at 09:04

## 2017-04-21 RX ADMIN — ERYTHROPOIETIN 10000 UNITS: 10000 INJECTION, SOLUTION INTRAVENOUS; SUBCUTANEOUS at 11:04

## 2017-04-21 RX ADMIN — CLOPIDOGREL BISULFATE 75 MG: 75 TABLET ORAL at 04:04

## 2017-04-21 NOTE — PT/OT/SLP PROGRESS
Physical Therapy      Quinn Hutchins  MRN: [id number]    LUCIE KLINE INITIATED THIS AM VIA CHART REVIEW, PT CURRENTLY IN HEMODIALYSIS, WILL ASSESS PT NEXT VISIT    Nicci Montalvo, PT   4/21/2017  0935

## 2017-04-21 NOTE — PROGRESS NOTES
HD today for 3.5 hrs. Pt tolerated well. 2 Units of PRBCs given during tx. 10,000 Units of Epogen given as well per orders. Net UF 1L CVC working fine.
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
## 2017-04-21 NOTE — ED NOTES
Consent obtained and signed for blood administration. Patient taught about signs and symptoms of a transfusion reaction at this time.

## 2017-04-21 NOTE — PLAN OF CARE
Recommendations     Recommendation/Intervention:   1. Rec add novasource renal tid   2. Rec Renal MVI   3. Monitor weight weekly   4. RD to monitor     Goals: Patient to meet 85% EEN  Nutrition Goal Status: new  Communication of RD Recs: reviewed with RN     Continuum of Care Plan     Referral to Outpatient Services: (D/C planning: Renal diet with supplements)

## 2017-04-21 NOTE — PLAN OF CARE
Met with patient and his wife. Patient currently is seen by [facility].     04/21/17 1547   Discharge Assessment   Assessment Type Discharge Planning Assessment   Confirmed/corrected address and phone number on facesheet? Yes   Assessment information obtained from? Patient;Medical Record   Prior to hospitilization cognitive status: Unable to Assess   Prior to hospitalization functional status: Assistive Equipment   Current cognitive status: Unable to Assess  (mumbling, inaccurate responses to questions)   Current Functional Status: Assistive Equipment;Needs Assistance   Arrived From home health   Lives With spouse   Able to Return to Prior Arrangements yes   Is patient able to care for self after discharge? No   How many people do you have in your home that can help with your care after discharge? 1   Who are your caregiver(s) and their phone number(s)? Naren Hutchins, wife, [phone]   Patient's perception of discharge disposition home health   Readmission Within The Last 30 Days no previous admission in last 30 days   Patient currently being followed by outpatient case management? Yes   If yes, name of outpatient case management following: [facility] outpatient case management   Patient currently receives home health services? Yes   Patient previously received home health services and would like to resume services if necessary? Yes   If yes, name of home health provider: [facility]   Does the patient currently use HME? Yes   Equipment Currently Used at Home bedside commode;bath bench;raised toilet;shower chair;walker, rolling;wheelchair   Do you have any problems affording any of your prescribed medications? No   Is the patient taking medications as prescribed? yes   Do you have any financial concerns preventing you from receiving the healthcare you need? No   Does the patient have transportation to healthcare appointments? Yes   Transportation Available family or friend will provide   On Dialysis?  Yes   If yes, what is the name of the dialysis unit? [facility]   Discharge Plan A Home Health   Discharge Plan B Skilled Nursing Facility   Patient/Family In Agreement With Plan yes

## 2017-04-21 NOTE — PROGRESS NOTES
"[facility] Hospital Medicine  Progress Note    Patient Name: Quinn Hutchins  MRN: [id number]  Patient Class: IP- Inpatient   Admission Date: 4/20/2017  Length of Stay: 1 days  Attending Physician: Baljeet Gamble, *  Primary Care Provider: Jameel Almaguer MD        Subjective:     Principal Problem: Syncope, Anemia, ESRD on HD, Sepsis    HPI:  Quinn Hutchins is a pleasant 71-year-old CM who presented to the ED today after "passing out in my car".  Patient states he was riding home with his wife when he asked for something to drink "then passed out".  Denies H/A, lightheadedness/dizziness, visual changes.  He has a history of end-stage renal disease on hemodialysis, Monday Wednesday Friday, [facility] hemodialysis unit under care of Dr. Kaylie Reilly.   On workup he has significant anemia with a hemoglobin of 7.6 noted. His hemoglobin was about 12 g a month ago. According to the patient's wife patient had a surgery on his left arm AV fistula, most likely clotted. He has an IJ Vas-Cath. Patient is oriented to person, place, and time,d but is very sleepy upon exam. He also has been having some gastrointestinal symptoms in the last few days. C/O diarrhea x 3 days, which occurred 2 days ago.  Currently he denies N/V/D.  He is due for dialysis tomorrow, blood cultures were ordered in the emergency room, he is also receiving 1 unit of packed RBC. Mild elevation of troponin I noted. He will admitted to [facility] for medical management. Dr. Church has been consulted and will follow. Cardiology consulted given extensive history with elevated troponin.      Hospital Course:  4-21- Underwent HD today and received 2 units of PRBC, with increase in Hgb to 10.1.  Peripheral BC + for gram positive cocci and clusters resembling staph.  Source may be Left IJ Vas Cath vs Left Heel wound.  Will check 1 set of BC from Vas Cath today.  Check Xray of Left foot to r/o as infectious source.  Will start on IV Vanc- pharmacy to dose. " ECHO today does not show evidence of Endocarditis. Tmax 99.2    Interval History: 4-21- Underwent HD today and received 2 units of PRBC, with increase in Hgb to 10.1.  Peripheral BC + for gram positive cocci and clusters resembling staph.  Source may be Left IJ Vas Cath vs Left Heel wound.  Will check 1 set of BC from Vas Cath today.  Check Xray of Left foot to r/o as infectious source.  Will start on IV Vanc- pharmacy to dose. ECHO today does not show evidence of Endocarditis. Tmax 99.2    Review of Systems   Constitutional: Positive for fatigue. Negative for chills, diaphoresis and fever.   HENT: Negative for hearing loss, mouth sores, sore throat, tinnitus and trouble swallowing.    Eyes: Negative for pain, discharge and redness.   Respiratory: Negative for apnea, cough, choking, chest tightness, shortness of breath, wheezing and stridor.    Cardiovascular: Negative for chest pain, palpitations and leg swelling.   Gastrointestinal: Negative for abdominal distention, abdominal pain, blood in stool, constipation, diarrhea, nausea, rectal pain and vomiting.   Endocrine: Negative for cold intolerance, heat intolerance, polydipsia, polyphagia and polyuria.   Genitourinary: Negative for difficulty urinating, dysuria, flank pain, frequency, hematuria and urgency.        ESRD on HD MWF   Musculoskeletal: Negative for arthralgias, back pain, gait problem, joint swelling, neck pain and neck stiffness.   Skin: Positive for wound. Negative for color change and rash.        Dry skin with Diffuse scabbing noted to entire body. Incision to LUE KATHERINE with sutures/staples CDI + erythema   Allergic/Immunologic: Negative for food allergies.   Neurological: Positive for weakness. Negative for dizziness, tremors, seizures, syncope, speech difficulty, light-headedness and headaches.   Hematological: Negative for adenopathy. Does not bruise/bleed easily.   Psychiatric/Behavioral: Positive for confusion. Negative for agitation. The  patient is not nervous/anxious.    All other systems reviewed and are negative.    Objective:     Vital Signs (Most Recent):  Temp: 97.6 °F (36.4 °C) (04/21/17 1300)  Pulse: 81 (04/21/17 1300)  Resp: 18 (04/21/17 1300)  BP: 137/82 (04/21/17 1300)  SpO2: (!) 94 % (04/21/17 0353) Vital Signs (24h Range):  Temp:  [97.4 °F (36.3 °C)-99.2 °F (37.3 °C)] 97.6 °F (36.4 °C)  Pulse:  [61-99] 81  Resp:  [17-20] 18  SpO2:  [92 %-100 %] 94 %  BP: ()/() 137/82     Weight: 76.8 kg (169 lb 5 oz)  Body mass index is 24.29 kg/(m^2).    Intake/Output Summary (Last 24 hours) at 04/21/17 1810  Last data filed at 04/21/17 1250   Gross per 24 hour   Intake          1711.25 ml   Output             2200 ml   Net          -488.75 ml      Physical Exam   Constitutional: He is oriented to person, place, and time. He appears well-developed and well-nourished. No distress.   HENT:   Head: Normocephalic and atraumatic.   Mouth/Throat: Oropharynx is clear and moist.   Eyes: Conjunctivae and EOM are normal. Pupils are equal, round, and reactive to light. Left eye exhibits no discharge.   Neck: Normal range of motion. Neck supple. No JVD present.   Left IJ Vas Cath site WNL with DSG CDI   Cardiovascular: Normal rate, regular rhythm and intact distal pulses.  Exam reveals no gallop and no friction rub.    Murmur heard.  Pulmonary/Chest: Effort normal and breath sounds normal. No stridor. No respiratory distress. He has no wheezes. He has no rales. He exhibits no tenderness.   Comfortable on 2L NC   Abdominal: Soft. Bowel sounds are normal. He exhibits no distension and no mass. There is no tenderness. There is no rebound and no guarding.   Musculoskeletal: Normal range of motion. He exhibits no edema or tenderness.   Neurological: He is alert and oriented to person, place, and time. No cranial nerve deficit.   Falls asleep quickly.  THOMAS. Follows all commands appropriately. Some confusion noted.  Poor historian.   Skin: Skin is warm and  dry. No rash noted. He is not diaphoretic. No erythema.        Dry skin with diffuse scabbing noted to entire body.  Incision to LUE shunt KATHERINE, sutures/staples intact.  + erythema/ecchymosis, no active drainage.  Left hip incision healed without s/s of infection. Left heel nickel size wound with scab intact, + ecchymosis, surrounding skin erythemic, no foul odor or active drainage.   Psychiatric: He has a normal mood and affect. His behavior is normal. Judgment and thought content normal.   Nursing note and vitals reviewed.      Significant Labs:   Blood Culture:   Recent Labs  Lab 04/20/17  1730 04/20/17  1742   LABBLOO No Growth to date Gram stain marco a bottle: Gram positive cocci in clusters resembling Staph  Results called to and read back by: Rhonda Rivero RN  04/21/2017  17:32     CBC:   Recent Labs  Lab 04/20/17  1500 04/21/17  0609 04/21/17  1511   WBC 12.16 14.36*  --    HGB 7.6* 8.0* 10.1*   HCT 23.0* 23.7*  --    * 157  --      CMP:   Recent Labs  Lab 04/20/17  1500 04/21/17  0609    137   K 3.9 3.4*   CL 97 96   CO2 25 28   GLU 80 62*   BUN 33* 43*   CREATININE 4.9* 5.9*   CALCIUM 9.5 9.3   PROT 6.2 5.8*   ALBUMIN 2.3* 2.1*  2.1*   BILITOT 0.7 0.8   ALKPHOS 119 115   * 143*   ALT 55* 49*   ANIONGAP 15 13   EGFRNONAA 11* 9*     Cardiac Markers:   Recent Labs  Lab 04/20/17  1500   BNP >4900*     Coagulation: No results for input(s): INR, APTT in the last 48 hours.    Invalid input(s): PT  Troponin:   Recent Labs  Lab 04/20/17  1500 04/21/17  0013 04/21/17  0609   TROPONINI 0.657* 0.627* 0.627*       Significant Imaging: I have reviewed all pertinent imaging results/findings within the past 24 hours.   Imaging Results         US Carotid Bilateral (Final result) Result time:  04/20/17 19:28:59    Final result by Yogesh Vergara MD (04/20/17 19:28:59)    Impression:         1.  Calcific atherosclerotic plaque in the proximal right internal carotid artery causing a 20-30% stenosis by flow  velocity measurements.  2.  Calcific atherosclerotic plaque in the proximal left internal carotid artery causing a 20% or less stenosis by velocity measurements    Stenosis of  % - validated velocity measurements with angiographic measurements, velocity criteria are extrapolated from diameter data as defined by the Society of Radiologists in Ultrasound Consensus Conference Radiology 2003; 229;340-346.      Electronically signed by: VASILE SANDOVAL MD  Date:     04/20/17  Time:    19:28     Narrative:    Exam: Carotid ultrasound    Clinical History:    Syncope     Findings:     Sonographic evaluation of the carotid systems was performed.     There is calcific plaque in the bulb of the right internal carotid artery narrowing the lumen of the vessel.  There is calcific atherosclerotic plaque in the bulb of the left internal carotid artery narrowing the lumen of the vessel.    The peak systolic velocity in the right internal carotid artery was approximately 134 cm/sec.  The right ICA to CCA peak systolic velocity ratio is 3.0.    The peak systolic velocity in the left internal carotid artery was approximately 94 cm/sec.  The left ICA to CCA peak site laceration is 1.3.    Antegrade flow noted in both vertebral arteries.            CT Head Without Contrast (Final result) Result time:  04/20/17 15:28:32    Final result by Naseem Gee MD (04/20/17 15:28:32)    Impression:      Mild generalized atrophy with white matter degeneration.  Intracranial vascular calcification.  No acute findings.        All CT scans at this facility use dose modulation, iterative reconstruction and/or weight based dosing when appropriate to reduce radiation dose to as low as reasonably achievable.       Electronically signed by: NASEEM GEE MD  Date:     04/20/17  Time:    15:28     Narrative:    CT HEAD WITHOUT CONTRAST     History:  Syncope.  Dizziness.  TIA.    Technique:  Noncontrast CT of the brain. Comparison with  03/12/2017.    Findings:  The ventricles are dilated consistent with generalized atrophy. Associated age-related white matter degeneration is present.     No significant acute findings are noted.            X-Ray Chest AP Portable (Final result) Result time:  04/20/17 15:04:54    Final result by Taisha Vergara MD (04/20/17 15:04:54)    Impression:      No significant change.  Findings most consistent with mild CHF.      Electronically signed by: TAISHA VERGARA MD  Date:     04/20/17  Time:    15:04     Narrative:    EXAM: XR CHEST AP PORTABLE    CLINICAL HISTORY: syncope.    COMPARISON STUDIES: March 12, 2017    FINDINGS:  Stable cardiomegaly.  Left chest pacer vs. AICD from inferior approach.  Dual-lumen left IJ unchanged.    Minimal vascular congestion without overt edema.  No pleural effusions evident.            Assessment/Plan:       Bacteremia  - Peripheral BC positive for gram + cocci and clusters  - Will check BC from Left IJ Vas Cath Today  - WBC elevated to 14K from 12K  - Source may be Left IJ Vas Cath versus Left heel wound  - Check Left Foot Xray to r/o infectious source  - Check lactic acid today  - Start Vancomycin- pharmacy to dose  - T max 99.2  - Currently hemodynamically stable      Syncope  -  No additional episodes since admission  -  Cardiology Consulted- Feels patient had Vtach/Fib related syncope with AICD firing  -  ECHO shows EF of 20-25%  -  Carotid US shows Calcific atherosclerotic plaque in the proximal right internal carotid artery causing a 20-30% stenosis, Left has 20% stenosis  -  Orthostatics q shift  -  Serial troponins stable at 0.627 (elevation likely secondary to ICD firing)  -  CT head shows no significant findings  - Monitor      Elevated troponin I level  - Troponin elevated to 0.657, trending down  - 12 lead EKG reviewed  - D/W Dr. De, likely secondary to Vtach/Fib AICD firing  - Serial Troponins stable   - Continue home ASA/Plavix  - Hold off on resuming Lipitor secondary  to elevated LFTs  - Lipid Panel Reviewed  - Awaiting Nuclear stress test per cardiology    ESRD (end stage renal disease)  - on HD [place] [facility] Follows with Dr. Reilly  - Tolerated HD today  - Dr. 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  Shunt currently not functioning  - Has Left IJ Vas Cath      Anemia  - Secondary to ESRD  - Could have contributed to syncopal episode  - Rceived 1 Unit PRBCs Thursday and 2 Units with HD today  - Hgb stable at 10.1  - Continue Epo  - CBC in AM        Chronic systolic congestive heart failure  - ECHO shows EF of 25%  - CXR reviewed  - BNP >4900 (h/o ESRD on HD MWF)  - Continue Coreg   - Plan for HD MWF  - Cardiology following    Paroxysmal atrial flutter  - Currently rate controlled and in NSR  - Continue Amio  - Monitor  - Not on long term OAC      Hypertension, renal  - BP suboptimal  - Continue Coreg  -  Monitor      Elevated LFTs  - Trending down  - Hold home Statin for now        Coronary artery disease  See above      VTE Risk Mitigation         Ordered     Place sequential compression device  Until discontinued      04/20/17 1832     Medium Risk of VTE  Once      04/20/17 1828          Aliza Branham NP  Department of Hospital Medicine   [facility]

## 2017-04-21 NOTE — PROGRESS NOTES
Nephrology Progress Note    Admit Date: 4/20/2017   LOS: 1 day     SUBJECTIVE:     Follow-up For:  Pt seen and chart reviewed , feels better , tolerated HD well,     Scheduled Meds:   amiodarone  200 mg Oral Daily    aspirin  81 mg Oral Daily    carvedilol  12.5 mg Oral BID    clopidogrel  75 mg Oral Daily    epoetin yadi (PROCRIT) injection  10,000 Units Intravenous Every Mon, Wed, Fri    heparin (porcine)  2,000 Units Intravenous Once    pantoprazole  40 mg Oral Daily    senna-docusate 8.6-50 mg  1 tablet Oral BID     Continuous Infusions:   PRN Meds:sodium chloride, sodium chloride, acetaminophen, albumin human 25%, dextrose 50%, dextrose 50%, glucagon (human recombinant), glucose, glucose, ondansetron, promethazine (PHENERGAN) IVPB    Review of patient's allergies indicates:   Allergen Reactions    Codeine Other (See Comments)     Hallucination    Hydrocodone Other (See Comments)     Impairs vision; AMS    Percocet [oxycodone-acetaminophen] Other (See Comments)     Makes him go crazy      Doxycycline Rash and Other (See Comments)     Vision impairment  Other reaction(s): Unknown    Levaquin [levofloxacin] Rash     Blisters    Xanax [alprazolam] Palpitations     pychosis        Review of Systems :    Constitutional: positive for fatigue and malaise  Eyes: negative  Ears, nose, mouth, throat, and face: negative  Respiratory: positive for dyspnea on exertion  Cardiovascular: negative  Gastrointestinal: negative  Hematologic/lymphatic: negative  Musculoskeletal:negative  Neurological: negative    OBJECTIVE:     Vital Signs (Most Recent)    Temp: 97.6 °F (36.4 °C) (04/21/17 1300)  Pulse: 81 (04/21/17 1300)  Resp: 18 (04/21/17 1300)  BP: 137/82 (04/21/17 1300)  SpO2: (!) 94 % (04/21/17 0353)    Vital Signs Range (Last 24H):    Temp:  [97.4 °F (36.3 °C)-99.2 °F (37.3 °C)]   Pulse:  [61-99]   Resp:  [17-22]   BP: ()/()   SpO2:  [92 %-100 %]     I & O (Last 24H):  Intake/Output Summary (Last 24  hours) at 04/21/17 1509  Last data filed at 04/21/17 1250   Gross per 24 hour   Intake          1711.25 ml   Output             2200 ml   Net          -488.75 ml     Physical Exam:    Gen: WDWN male in no apparent distress  Skin: No rashes or ulcers , multiple healed scabs on both hands   Eyes: Normal conjunctiva and lids,   ENT: Normal hearing with no oropharyngeal lesions  Neck: No JVD, IJ vascath   Chest: Clear with no rales, rhonchi, wheezing with normal effort  CV: Regular with no murmurs, gallops or rubs  Abd: Soft, nontender, no distension, positive bowel sounds  Ext: No cyanosis, clubbing or edema  Neuro : non-focal     Laboratory:    CBC:   Recent Labs  Lab 04/21/17  0609   WBC 14.36*   RBC 2.56*   HGB 8.0*   HCT 23.7*      MCV 93   MCH 31.3*   MCHC 33.8     BMP:   Recent Labs  Lab 04/21/17  0609   GLU 62*      K 3.4*   CL 96   CO2 28   BUN 43*   CREATININE 5.9*   CALCIUM 9.3     CMP:   Recent Labs  Lab 04/21/17  0609   GLU 62*   CALCIUM 9.3   ALBUMIN 2.1*  2.1*   PROT 5.8*      K 3.4*   CO2 28   CL 96   BUN 43*   CREATININE 5.9*   ALKPHOS 115   ALT 49*   *   BILITOT 0.8     LFTs:   Recent Labs  Lab 04/21/17  0609   ALT 49*   *   ALKPHOS 115   BILITOT 0.8   PROT 5.8*   ALBUMIN 2.1*  2.1*     Lab Results   Component Value Date    .0 (H) 10/26/2016    CALCIUM 9.3 04/21/2017    PHOS 5.4 (H) 04/21/2017       Lab Results   Component Value Date    ALBUMIN 2.1 (L) 04/21/2017    ALBUMIN 2.1 (L) 04/21/2017         Diagnostic Results: reviewed     ASSESSMENT/PLAN:          IMPRESSION AND RECOMMENDATIONS: 81-year-old  gentleman seen in f/u for following medical problems     1. End-stage renal disease on hemodialysis - ( MWF , [facility] ) , Tolerated HD this AM , received 2 more units of pRBC on HD today ,      2. Anemia - multifactorial, he had a surgical procedure about 2 weeks ago. Hemoglobin is 8 g, as above , epogen with HD ,      3. AMS : Etiology unclear, check  blood cultures as patient currently has a Vas-Cath. Negative so far,      4. Secondary hyperparathyroidism - renal diet, resume home binders.     5. Coronary artery disease - troponin leak noted, Cards planning stress test , but pt has declined today ,      6. Hypertension - BP controlled,     7. SHPT : renal diet,      Will continue to monitor. Total time spent 40 minutes including time needed to review the records,  patient evaluation, documentation, face-to-face discussion with the patient, his brother, Primary team, Cards ,   more than 50% of the time was spent on coordination of care and counseling.      Fermin Church MD

## 2017-04-21 NOTE — PROGRESS NOTES
[facility] -   Adult Nutrition  Consult Note    SUMMARY     Recommendations    Recommendation/Intervention:   1. Rec add novasource renal tid   2. Rec Renal MVI   3. Monitor weight weekly   4. RD to monitor    Goals: Patient to meet 85% EEN  Nutrition Goal Status: new  Communication of RD Recs: reviewed with RN    Continuum of Care Plan    Referral to Outpatient Services:  (D/C planning: Renal diet with supplements)    Reason for Assessment    Reason for Assessment: identified at risk by screening criteria  Diagnosis:  (A-flutter)  Relevent Medical History: ESRD/HD; HTN, Gout   Interdisciplinary Rounds: did not attend     General Information Comments: Spoke with family member. Reports appetite to be poor for past several weeks. Noted consistent body weight from last admission (3/12). Denies issues with n/v/chewing or swallowing. Interested in trying supplements.    Nutrition Prescription Ordered    Current Diet Order: Renal     Evaluation of Received Nutrients/Fluid Intake        % Intake of Estimated Needs: 25-50%   % Intake of Meals: 25-50%    I/O: 511/-      Nutrition Risk Screen     Nutrition Risk Screen: unintentional loss of 10 lbs or more in the past 2 mos, large or nonhealing wound, burn or pressure ulcer    Nutrition/Diet History     Food Preferences: Denies cultural, Mormon, ethnic food preferences.  Factors Affecting Nutritional Intake: decreased appetite     Labs/Tests/Procedures/Meds     Pertinent Labs Reviewed: reviewed  Pertinent Labs Comments: K 3.4; Phos 5.4; alb 2.1  Pertinent Medications Reviewed: reviewed       Physical Findings    Overall Physical Appearance:  (SARAH BETH)  Oral/Mouth Cavity: WDL  Skin:  (heel wound (susp. DT)    Anthropometrics     Height (inches): 70 in     Weight (kg): 76.8 kg  Ideal Body Weight (IBW), Male: 166 lb     % Ideal Body Weight, Male (lb): 102 lb     BMI (kg/m2): 24.29  BMI Grade: 18.5-24.9 - normal    Estimated/Assessed Needs    Weight Used For  Calorie Calculations: 76.8 kg (169 lb 5 oz)   Height (cm): 177.8 cm     Energy Need Method: Norton-St Jeor (3416-6209 kcal)     RMR (Norton-St. Jeor Equation): 1544      Weight Used For Protein Calculations: 76.8 kg (169 lb 5 oz)  Protein Requirements: 95 -110 g    Fluid Need Method: RDA Method     Assessment and Plan    Nutrition Diagnosis    Problem: Inadequate Energy Intake  Etiology: Decreased appetite  As Evidenced by:  25-50% po intake with meals  Nutrition Diagnosis Status: New    Monitor and Evaluation    Food and Nutrient Intake: energy intake, food and beverage intake  Food and Nutrient Adminstration: diet order  Anthropometric Measurements: weight, weight change  Biochemical Data, Medical Tests and Procedures: electrolyte and renal panel  Nutrition-Focused Physical Findings: overall appearance, skin    Nutrition Risk    Level of Risk:  (2 x week)    Nutrition Follow-Up    RD Follow-up?: Yes

## 2017-04-21 NOTE — CONSULTS
04/21/17 0949   WOCN Assessment   WOCN Total Time (mins) 45   Visit Date 04/21/17   Visit Time 0800   Consult Type New   WOCN Speciality Wound   Wound pressure   Number of Wounds 1   Intervention assessed;changed;team conference;orders   Teaching on-going;complication;discharge     Consult received on this 72 y/o M patient for suspected DTI to left heel. PMH includes ESRD, HD , CHF, HTN, and 1 mos ago suffered a left hip fracture and surgical repair.  Patient was discharge from hospital at that time to a rehab facility, and has since been home with HH and PT.  Wife states he has had this wound since rehab, but it had improved in size and coloring this weekend.  Left heel floated off mattress with doubled over pillow.  Yellow nonslip sock removed to reveal a  Present on admission suspected DTI with dark purple discoloration measuring 2.5x2 cm, and non blanchable redness of surrounding tissue extending out up to 2 cm all around wound.  Dry piece of skin removed with sock that was covering wound bed, and wound bed now open. Open area measures 1.25x0.75 cm. Wound base is pink and dark purple in color, with 50% yellow fibrous tissue noted to superior portion of open area.  Cleansed with easi cleans foam wipes, intact doe wound skin painted with cavilon skin barrier.  Mesalt gauze cut to size and placed over yellow fibrous area and secured with Mepilex border heel dressing.  Patient does c/o pain 3/10 to heel when it is touched.  He states the pain extends up his achilles tendon area, but no redness or breakdown noted at this time.  Encouraged patient to keep left heel floated off mattress, and wife coached on how to monitor this and check by passing hand between heel and mattress frequently.  Right heel and sacrum also assessed and intact. Also noted area of redness to left lateral foot at base of 5th toe.  Slow to nevin.  Painted with cavilon skin barrier and educated wife and patient on pressure reduction.   1045:  "spoke with Dr. Gautam regarding this patient and requested him to add DTI to problem list.      Please see below for wound care recommendations and picture:    Skin Care Precautions / Pressure Ulcer Prevention:  1. Follow "Guidelines for Prevention of Pressure Ulcers in at Risk Patients"  2. Document wound assessment in [facility] using guidelines in Sameer's "Assessment : Wound" procedure  3. Obtain Easi Cleans Foam Wipes for providing doe care - avoid the use of wash cloths to areas affected by IAD.  4. Obtain foam wedge from materials management to assist with maintaining proper position changes at least q 2hours and document actual position in [facility] q 2hours  5. Please elevate heels off mattress and document in the frequent checks section of DOC Flow Sheets every 2 hours.  6. Do NOT elevate HOB greater than 30 degrees unless contraindicated.  7. Apply Clear Barrier Ointment to perineal / perirectal areas in a thin even layer to clean dry skin BID and after each episode of pericare  8. Apply sween 24 moisturizer cream to all dry skin after daily bath and prn  9. Gently peel back mepilex dressing at least once per 12 hour shift for skin/wound assessment and carefully reapply dressing over wound, change every 5 days and prn if soiled    Left heel DTI:  1. Cleanse with easi cleans foam wipes  2. Paint doe wound skin with cavilon skin barrier  3. Cut mesalt gauze to size and place over open wound if yellow slough/fibrous tissue is present.  4. Secure with Mepilex border heel dressing  5. Gently peel back dressing at least once per 12 hour shift for skin/wound assessment and carefully reapply dressing over wound, change every 5 days and prn if soiled                  "

## 2017-04-21 NOTE — SUBJECTIVE & OBJECTIVE
Interval History: 4-21- Underwent HD today and received 2 units of PRBC.  BC + for gram positive cocci and clusters resembling staph.  Source may be Left IJ Vas Cath.  Patient also with Left Heel Foot Wound. Will start on IV Vanc- pharmacy to dose. Check Xray of Left foot to r/o as infectious source.  ECHO today does not show evidence of Endocarditis    Review of Systems   Constitutional: Positive for fatigue. Negative for chills, diaphoresis and fever.   HENT: Negative for hearing loss, mouth sores, sore throat, tinnitus and trouble swallowing.    Eyes: Negative for pain, discharge and redness.   Respiratory: Negative for apnea, cough, choking, chest tightness, shortness of breath, wheezing and stridor.    Cardiovascular: Negative for chest pain, palpitations and leg swelling.   Gastrointestinal: Negative for abdominal distention, abdominal pain, blood in stool, constipation, diarrhea, nausea, rectal pain and vomiting.   Endocrine: Negative for cold intolerance, heat intolerance, polydipsia, polyphagia and polyuria.   Genitourinary: Negative for difficulty urinating, dysuria, flank pain, frequency, hematuria and urgency.        ESRD on HD MWF   Musculoskeletal: Negative for arthralgias, back pain, gait problem, joint swelling, neck pain and neck stiffness.   Skin: Positive for wound. Negative for color change and rash.        Dry skin with Diffuse scabbing noted to entire body. Incision to LUE KATHERINE with sutures/staples CDI + erythema   Allergic/Immunologic: Negative for food allergies.   Neurological: Positive for weakness. Negative for dizziness, tremors, seizures, syncope, speech difficulty, light-headedness and headaches.   Hematological: Negative for adenopathy. Does not bruise/bleed easily.   Psychiatric/Behavioral: Positive for confusion. Negative for agitation. The patient is not nervous/anxious.    All other systems reviewed and are negative.    Objective:     Vital Signs (Most Recent):  Temp: 97.6 °F (36.4  °C) (04/21/17 1300)  Pulse: 81 (04/21/17 1300)  Resp: 18 (04/21/17 1300)  BP: 137/82 (04/21/17 1300)  SpO2: (!) 94 % (04/21/17 0353) Vital Signs (24h Range):  Temp:  [97.4 °F (36.3 °C)-99.2 °F (37.3 °C)] 97.6 °F (36.4 °C)  Pulse:  [61-99] 81  Resp:  [17-20] 18  SpO2:  [92 %-100 %] 94 %  BP: ()/() 137/82     Weight: 76.8 kg (169 lb 5 oz)  Body mass index is 24.29 kg/(m^2).    Intake/Output Summary (Last 24 hours) at 04/21/17 1810  Last data filed at 04/21/17 1250   Gross per 24 hour   Intake          1711.25 ml   Output             2200 ml   Net          -488.75 ml      Physical Exam   Constitutional: He is oriented to person, place, and time. He appears well-developed and well-nourished. No distress.   HENT:   Head: Normocephalic and atraumatic.   Mouth/Throat: Oropharynx is clear and moist.   Eyes: Conjunctivae and EOM are normal. Pupils are equal, round, and reactive to light. Left eye exhibits no discharge.   Neck: Normal range of motion. Neck supple. No JVD present.   Left IJ Vas Cath site WNL with DSG CDI   Cardiovascular: Normal rate, regular rhythm and intact distal pulses.  Exam reveals no gallop and no friction rub.    Murmur heard.  Pulmonary/Chest: Effort normal and breath sounds normal. No stridor. No respiratory distress. He has no wheezes. He has no rales. He exhibits no tenderness.   Comfortable on 2L NC   Abdominal: Soft. Bowel sounds are normal. He exhibits no distension and no mass. There is no tenderness. There is no rebound and no guarding.   Musculoskeletal: Normal range of motion. He exhibits no edema or tenderness.   Neurological: He is alert and oriented to person, place, and time. No cranial nerve deficit.   Falls asleep quickly.  MAEW. Follows all commands appropriately. Some confusion noted.  Poor historian.   Skin: Skin is warm and dry. No rash noted. He is not diaphoretic. No erythema.        Dry skin with diffuse scabbing noted to entire body.  Incision to LUE shunt KATHERINE,  sutures/staples intact.  + erythema/ecchymosis, no active drainage.  Left hip incision healed without s/s of infection. Left heel nickel size wound with scab intact, + ecchymosis, surrounding skin erythemic, no foul odor or active drainage.   Psychiatric: He has a normal mood and affect. His behavior is normal. Judgment and thought content normal.   Nursing note and vitals reviewed.      Significant Labs:   Blood Culture:   Recent Labs  Lab 04/20/17  1730 04/20/17  1742   LABBLOO No Growth to date Gram stain marco a bottle: Gram positive cocci in clusters resembling Staph  Results called to and read back by: Rhonda Rivero RN  04/21/2017  17:32     CBC:   Recent Labs  Lab 04/20/17  1500 04/21/17  0609 04/21/17  1511   WBC 12.16 14.36*  --    HGB 7.6* 8.0* 10.1*   HCT 23.0* 23.7*  --    * 157  --      CMP:   Recent Labs  Lab 04/20/17  1500 04/21/17  0609    137   K 3.9 3.4*   CL 97 96   CO2 25 28   GLU 80 62*   BUN 33* 43*   CREATININE 4.9* 5.9*   CALCIUM 9.5 9.3   PROT 6.2 5.8*   ALBUMIN 2.3* 2.1*  2.1*   BILITOT 0.7 0.8   ALKPHOS 119 115   * 143*   ALT 55* 49*   ANIONGAP 15 13   EGFRNONAA 11* 9*     Cardiac Markers:   Recent Labs  Lab 04/20/17  1500   BNP >4900*     Coagulation: No results for input(s): INR, APTT in the last 48 hours.    Invalid input(s): PT  Troponin:   Recent Labs  Lab 04/20/17  1500 04/21/17  0013 04/21/17  0609   TROPONINI 0.657* 0.627* 0.627*       Significant Imaging: I have reviewed all pertinent imaging results/findings within the past 24 hours.   Imaging Results         US Carotid Bilateral (Final result) Result time:  04/20/17 19:28:59    Final result by Yogesh Vergara MD (04/20/17 19:28:59)    Impression:         1.  Calcific atherosclerotic plaque in the proximal right internal carotid artery causing a 20-30% stenosis by flow velocity measurements.  2.  Calcific atherosclerotic plaque in the proximal left internal carotid artery causing a 20% or less stenosis by  velocity measurements    Stenosis of  % - validated velocity measurements with angiographic measurements, velocity criteria are extrapolated from diameter data as defined by the Society of Radiologists in Ultrasound Consensus Conference Radiology 2003; 229;340-346.      Electronically signed by: VASILE SANDOVAL MD  Date:     04/20/17  Time:    19:28     Narrative:    Exam: Carotid ultrasound    Clinical History:    Syncope     Findings:     Sonographic evaluation of the carotid systems was performed.     There is calcific plaque in the bulb of the right internal carotid artery narrowing the lumen of the vessel.  There is calcific atherosclerotic plaque in the bulb of the left internal carotid artery narrowing the lumen of the vessel.    The peak systolic velocity in the right internal carotid artery was approximately 134 cm/sec.  The right ICA to CCA peak systolic velocity ratio is 3.0.    The peak systolic velocity in the left internal carotid artery was approximately 94 cm/sec.  The left ICA to CCA peak site laceration is 1.3.    Antegrade flow noted in both vertebral arteries.            CT Head Without Contrast (Final result) Result time:  04/20/17 15:28:32    Final result by Naseem Gee MD (04/20/17 15:28:32)    Impression:      Mild generalized atrophy with white matter degeneration.  Intracranial vascular calcification.  No acute findings.        All CT scans at this facility use dose modulation, iterative reconstruction and/or weight based dosing when appropriate to reduce radiation dose to as low as reasonably achievable.       Electronically signed by: NASEEM GEE MD  Date:     04/20/17  Time:    15:28     Narrative:    CT HEAD WITHOUT CONTRAST     History:  Syncope.  Dizziness.  TIA.    Technique:  Noncontrast CT of the brain. Comparison with 03/12/2017.    Findings:  The ventricles are dilated consistent with generalized atrophy. Associated age-related white matter degeneration is present.      No significant acute findings are noted.            X-Ray Chest AP Portable (Final result) Result time:  04/20/17 15:04:54    Final result by Taisha Vergara MD (04/20/17 15:04:54)    Impression:      No significant change.  Findings most consistent with mild CHF.      Electronically signed by: TAISHA VERGARA MD  Date:     04/20/17  Time:    15:04     Narrative:    EXAM: XR CHEST AP PORTABLE    CLINICAL HISTORY: syncope.    COMPARISON STUDIES: March 12, 2017    FINDINGS:  Stable cardiomegaly.  Left chest pacer vs. AICD from inferior approach.  Dual-lumen left IJ unchanged.    Minimal vascular congestion without overt edema.  No pleural effusions evident.

## 2017-04-21 NOTE — PROGRESS NOTES
Pt refuses NMT at this time. Dr Church, pts brother and Oniel from NMT in the pt's room. Cardiology to be consulted to discuss concerns with pt and his family.

## 2017-04-21 NOTE — CONSULTS
[facility] -   Cardiology  Consult Note    Patient Name: Quinn Hutchins  MRN: [id number]  Admission Date: 4/20/2017  Hospital Length of Stay: 0 days  Code Status: Full Code   Attending Provider: Ant Deshpande MD   Consulting Provider: Ivana De MD  Primary Care Physician: Jameel Almageur MD  Principal Problem:<principal problem not specified>    Patient information was obtained from patient and ER records.     Inpatient consult to Cardiology  Consult performed by: IVANA DE  Consult ordered by: TRENT CARVAJAL        Subjective:     Chief Complaint:  ICD fired       HPI: 70 yo male, PMH CAD s/p recent cath in  distal LAD OTC, OM1 ostial 70%, EF 20%, s/p S-ICD, s/p CTI ablation of AFL, ESRD on HD, HTN, s/p AV fistula.  Per his wife, today on the way to home, she saw her  passed out in the car, then he jumped up the chair and b/l arms spread out. Then he woke up and confused with dyspnea. She believed his ICD fired. Pt could not remember any thing before and after the episode. He was seen at EP office today and had appropriate ICD fired after VT two weeks ago when he was in vascular procedure regarding his left arm fistula.   In ER, HGB 7.3 <-11.8 (one month ago), elevated BNP and troponin. ekg showed NSR, LVH with 2nd stt change.      Past Medical History:   Diagnosis Date    A-V fistula     RIGHT UPPER ARM    Anemia in chronic kidney disease 8/16/2012    Anticoagulant long-term use     Arthritis     Atrial flutter     Cancer     RIGHT RENAL CELL; SKIN CA    Chronic ischemic heart disease 8/20/2013    Chronic systolic congestive heart failure 4/12/2016    Coronary artery disease 8/16/2012    Depression     Dialysis patient     M-W-F    Elbow fracture     Encounter for blood transfusion     ESRD (end stage renal disease) on dialysis 8/16/2012    Fractured coccyx     Gout, unspecified 8/16/2012    Heel bone fracture     Hypertension 8/20/2013    Hypertension, renal  "8/16/2012    Ischemic cardiomyopathy 8/20/2013    Kidney stones     Macular degeneration     Mixed hyperlipidemia 8/20/2013    Myocardial infarction     Neuromuscular disorder     JEROME (obstructive sleep apnea) 4/12/2016    JEROME on CPAP     Paroxysmal atrial flutter 5/12/2016    Personal history of skin cancer     Rheumatoid arthritis 8/16/2012    Secondary hyperparathyroidism of renal origin 8/16/2012       Past Surgical History:   Procedure Laterality Date    AV FISTULA PLACEMENT      REVISION X2    CARDIAC CATHETERIZATION      JOINT REPLACEMENT Right     hip    PARTIAL NEPHRECTOMY Right 2008    for renal cell cancer - "stage 1" per patient.    SKIN BIOPSY      TONSILLECTOMY         Review of patient's allergies indicates:   Allergen Reactions    Codeine Other (See Comments)     Hallucination    Hydrocodone Other (See Comments)     Impairs vision; AMS    Percocet [oxycodone-acetaminophen] Other (See Comments)     Makes him go crazy      Doxycycline Rash and Other (See Comments)     Vision impairment  Other reaction(s): Unknown    Levaquin [levofloxacin] Rash     Blisters    Xanax [alprazolam] Palpitations     pychosis        Current Facility-Administered Medications on File Prior to Encounter   Medication    hyaluronate sodium, stabilized Syrg 4 mL     Current Outpatient Prescriptions on File Prior to Encounter   Medication Sig    acetaminophen (TYLENOL) 500 MG tablet Take 500 mg by mouth every 6 (six) hours as needed for Pain.    acitretin (SORIATANE) 10 MG capsule Take 10 mg by mouth every evening.     amiodarone (PACERONE) 200 MG Tab Take 200 mg by mouth 2 (two) times daily.    aspirin (ECOTRIN) 81 MG EC tablet Take 81 mg by mouth once daily.    atorvastatin (LIPITOR) 40 MG tablet TAKE 1 TABLET BY MOUTH EVERY EVENING    carvedilol (COREG) 12.5 MG tablet Take 1 tablet by mouth 2 (two) times daily.    cetirizine (ZYRTEC) 10 MG tablet Take 10 mg by mouth once daily.    cloNIDine " (CATAPRES) 0.3 MG tablet Take 0.3 mg by mouth as needed.    clopidogrel (PLAVIX) 75 mg tablet Take 75 mg by mouth once daily.    docusate sodium (COLACE) 100 MG capsule Take 1 capsule (100 mg total) by mouth 3 (three) times daily as needed for Constipation.    fluticasone (FLONASE) 50 mcg/actuation nasal spray 2 sprays by Each Nare route once daily.    isosorbide mononitrate (IMDUR) 30 MG 24 hr tablet Take 1 tablet (30 mg total) by mouth once daily.    mupirocin (BACTROBAN) 2 % ointment Apply to wound infection 2 times a week for 7 days (Patient taking differently: Apply topically as needed. Apply to wound infection 2 times a week for 7 days)    nitroGLYCERIN (NITROSTAT) 0.4 MG SL tablet Place 1 tablet (0.4 mg total) under the tongue every 5 (five) minutes as needed for Chest pain.    ondansetron (ZOFRAN-ODT) 4 MG TbDL Take 2 tablets (8 mg total) by mouth every 8 (eight) hours as needed.    pantoprazole (PROTONIX) 40 MG tablet TAKE 1 TABLET (40 MG TOTAL) BY MOUTH ONCE DAILY.    predniSONE (DELTASONE) 5 MG tablet TAKE 1 TABLET(S) ORAL (BY MOUTH) EVERY DAY    pyridoxine (VITAMIN B-6) 200 mg Tab Take 1 tablet by mouth every evening.     quetiapine (SEROQUEL) 25 MG Tab Take 1 tablet (25 mg total) by mouth every evening. Prn sleep    RANEXA 500 mg Tb12 TAKE 1 TABLET TWICE A DAY    sertraline (ZOLOFT) 50 MG tablet Take 50 mg by mouth once daily.    sevelamer carbonate (RENVELA) 800 mg Tab Take 1 tablet (800 mg total) by mouth 3 (three) times daily with meals.    thiamine 100 MG tablet Take 1 tablet (100 mg total) by mouth once daily.    tramadol (ULTRAM) 50 mg tablet Take 1 tablet (50 mg total) by mouth every 6 (six) hours as needed for Pain.    [DISCONTINUED] loratadine (CLARITIN) 10 mg tablet Take 10 mg by mouth once daily.     Family History     Problem Relation (Age of Onset)    Cancer Brother    Heart disease Mother    Hyperlipidemia Brother    Hypertension Mother, Brother    Kidney disease Mother,  Maternal Aunt    Parkinsonism Father        Social History Main Topics    Smoking status: Never Smoker    Smokeless tobacco: Never Used    Alcohol use No      Comment: Drank only while serving in [place]    Drug use: No    Sexual activity: Yes     Partners: Female     Review of Systems   Unable to perform ROS: other     Objective:     Vital Signs (Most Recent):  Temp: 100.1 °F (37.8 °C) (04/20/17 1433)  Pulse: 68 (04/20/17 1923)  Resp: 18 (04/20/17 1923)  BP: 125/63 (04/20/17 1923)  SpO2: 100 % (04/20/17 1923) Vital Signs (24h Range):  Temp:  [100.1 °F (37.8 °C)] 100.1 °F (37.8 °C)  Pulse:  [66-70] 68  Resp:  [18-22] 18  SpO2:  [93 %-100 %] 100 %  BP: (100-126)/(40-63) 125/63        There is no height or weight on file to calculate BMI.    SpO2: 100 %       No intake or output data in the 24 hours ending 04/20/17 1942    Lines/Drains/Airways     Peripheral Intravenous Line                 Peripheral IV - Single Lumen 04/20/17 1451 Right Forearm less than 1 day         Peripheral IV - Single Lumen 04/20/17 1730 Right Hand less than 1 day                Physical Exam   Constitutional: He is oriented to person, place, and time. He appears well-nourished.   HENT:   Head: Normocephalic.   Eyes: Pupils are equal, round, and reactive to light.   Neck: Normal carotid pulses and no JVD present. Carotid bruit is not present. No thyromegaly present.   Cardiovascular: Normal rate, regular rhythm, normal heart sounds and normal pulses.   No extrasystoles are present. PMI is not displaced.  Exam reveals no gallop and no S3.    No murmur heard.  Pulmonary/Chest: Breath sounds normal. No stridor. No respiratory distress.   Crackles on bases   Abdominal: Soft. Bowel sounds are normal. There is no tenderness. There is no rebound.   Musculoskeletal: Normal range of motion.   Neurological: He is alert and oriented to person, place, and time.   Skin: Skin is intact. No rash noted.   Left arm s/p staples of fistula   Psychiatric:  His behavior is normal.       Significant Labs:   ABG: No results for input(s): PH, PCO2, HCO3, POCSATURATED, BE in the last 48 hours., Blood Culture: No results for input(s): LABBLOO in the last 48 hours., BMP:   Recent Labs  Lab 04/20/17  1500   GLU 80      K 3.9   CL 97   CO2 25   BUN 33*   CREATININE 4.9*   CALCIUM 9.5   , CMP   Recent Labs  Lab 04/20/17  1500      K 3.9   CL 97   CO2 25   GLU 80   BUN 33*   CREATININE 4.9*   CALCIUM 9.5   PROT 6.2   ALBUMIN 2.3*   BILITOT 0.7   ALKPHOS 119   *   ALT 55*   ANIONGAP 15   ESTGFRAFRICA 13*   EGFRNONAA 11*   , CBC   Recent Labs  Lab 04/20/17  1500   WBC 12.16   HGB 7.6*   HCT 23.0*   *   , INR No results for input(s): INR, PROTIME in the last 48 hours., Lipid Panel No results for input(s): CHOL, HDL, LDLCALC, TRIG, CHOLHDL in the last 48 hours. and Troponin   Recent Labs  Lab 04/20/17  1500   TROPONINI 0.657*       Significant Imaging: X-Ray: CXR: X-Ray Chest 1 View (CXR): No results found for this visit on 04/20/17.  Assessment and Plan:     Per presentation, he had VT/VFIB related syncope and subsequent ICD firing.  Acute anemia  Elevated troponin, post ICD firing  Elevated BNP acute anemia related  CAD  CHF rEF 20% decompensated  S/p S-ICD  ESRD on HD  hemarrhoid  H/o excessive bleeding when on anticoagulation Rx  AFL s/p CTI ablation in       Plan:  Blood transfusion to keep HGB > 9  Arrange nuclear stress test in AM  Continue coreg, amiodarone 200 mg bid, ASA and Plavix.  HD per nephro  DASH and fluid restriction      Active Diagnoses:    Diagnosis Date Noted POA    Anemia [D64.9] 04/20/2017 Yes    Elevated LFTs [R94.5] 04/20/2017 Unknown    Leukocytosis [D72.829] 04/20/2017 Unknown    Elevated troponin I level [R74.8] 01/27/2017 Yes    Syncope [R55] 05/11/2016 Yes    Chronic systolic congestive heart failure [I50.22] 04/12/2016 Yes    ESRD (end stage renal disease) [N18.6] 03/13/2014 Yes     Chronic    Hypertension,  renal [I12.9] 08/16/2012 Yes     Chronic      Problems Resolved During this Admission:    Diagnosis Date Noted Date Resolved POA       VTE Risk Mitigation         Ordered     Place sequential compression device  Until discontinued      04/20/17 1832     Medium Risk of VTE  Once      04/20/17 1828          Thank you for your consult. I will follow-up with patient. Please contact us if you have any additional questions.    Ramón De MD  Cardiology   [facility] [place]

## 2017-04-21 NOTE — PLAN OF CARE
Problem: Patient Care Overview  Goal: Plan of Care Review  Outcome: Ongoing (interventions implemented as appropriate)  Pt alert and oriented. Wound, PT and dietary consult placed. Troponin trended during shift. 1 unit blood administered during shift. L vas cath CDI. Wound to L heel noted - pt stated from previous PT exercises? Mepilex applied, heels floated with pillow. Encouraged pt to turn during hourly rounding. Pt remained free of falls during shift, bed alarm set, family at bedside, call light in reach, room free of clutter, side rails up X2, pt on telemetry monitor SR 70's, will continue to monitor.

## 2017-04-21 NOTE — PLAN OF CARE
Problem: Patient Care Overview  Goal: Plan of Care Review  Outcome: Ongoing (interventions implemented as appropriate)  Wound POC reviewed with patient and wife including keeping left heel elevated off mattress at all time with pillow.

## 2017-04-22 LAB
BASOPHILS # BLD AUTO: 0.01 K/UL
BASOPHILS NFR BLD: 0.1 %
DIFFERENTIAL METHOD: ABNORMAL
EOSINOPHIL # BLD AUTO: 0 K/UL
EOSINOPHIL NFR BLD: 0.1 %
ERYTHROCYTE [DISTWIDTH] IN BLOOD BY AUTOMATED COUNT: 17.7 %
HCT VFR BLD AUTO: 29.4 %
HGB BLD-MCNC: 10.1 G/DL
LYMPHOCYTES # BLD AUTO: 0.7 K/UL
LYMPHOCYTES NFR BLD: 4.6 %
MCH RBC QN AUTO: 31.4 PG
MCHC RBC AUTO-ENTMCNC: 34.4 %
MCV RBC AUTO: 91 FL
MONOCYTES # BLD AUTO: 1.1 K/UL
MONOCYTES NFR BLD: 7.4 %
NEUTROPHILS # BLD AUTO: 13.1 K/UL
NEUTROPHILS NFR BLD: 87.8 %
PLATELET # BLD AUTO: 154 K/UL
PMV BLD AUTO: 10.3 FL
RBC # BLD AUTO: 3.22 M/UL
WBC # BLD AUTO: 14.95 K/UL

## 2017-04-22 PROCEDURE — 25000003 PHARM REV CODE 250: Performed by: INTERNAL MEDICINE

## 2017-04-22 PROCEDURE — 25000003 PHARM REV CODE 250: Performed by: NURSE PRACTITIONER

## 2017-04-22 PROCEDURE — 21400001 HC TELEMETRY ROOM

## 2017-04-22 PROCEDURE — G8979 MOBILITY GOAL STATUS: HCPCS | Mod: CJ

## 2017-04-22 PROCEDURE — 85025 COMPLETE CBC W/AUTO DIFF WBC: CPT

## 2017-04-22 PROCEDURE — 94761 N-INVAS EAR/PLS OXIMETRY MLT: CPT

## 2017-04-22 PROCEDURE — G8978 MOBILITY CURRENT STATUS: HCPCS | Mod: CK

## 2017-04-22 PROCEDURE — 99232 SBSQ HOSP IP/OBS MODERATE 35: CPT | Mod: ,,, | Performed by: INTERNAL MEDICINE

## 2017-04-22 PROCEDURE — 99233 SBSQ HOSP IP/OBS HIGH 50: CPT | Mod: ,,, | Performed by: INTERNAL MEDICINE

## 2017-04-22 PROCEDURE — 36415 COLL VENOUS BLD VENIPUNCTURE: CPT

## 2017-04-22 PROCEDURE — 27000221 HC OXYGEN, UP TO 24 HOURS

## 2017-04-22 PROCEDURE — 97162 PT EVAL MOD COMPLEX 30 MIN: CPT

## 2017-04-22 RX ORDER — ISOSORBIDE MONONITRATE 30 MG/1
30 TABLET, EXTENDED RELEASE ORAL DAILY
Status: DISCONTINUED | OUTPATIENT
Start: 2017-04-22 | End: 2017-04-25 | Stop reason: HOSPADM

## 2017-04-22 RX ORDER — AMIODARONE HYDROCHLORIDE 200 MG/1
200 TABLET ORAL 2 TIMES DAILY
Status: DISCONTINUED | OUTPATIENT
Start: 2017-04-22 | End: 2017-04-25 | Stop reason: HOSPADM

## 2017-04-22 RX ORDER — SERTRALINE HYDROCHLORIDE 50 MG/1
50 TABLET, FILM COATED ORAL DAILY
Status: DISCONTINUED | OUTPATIENT
Start: 2017-04-22 | End: 2017-04-25 | Stop reason: HOSPADM

## 2017-04-22 RX ORDER — RANOLAZINE 500 MG/1
500 TABLET, EXTENDED RELEASE ORAL 2 TIMES DAILY
Status: DISCONTINUED | OUTPATIENT
Start: 2017-04-22 | End: 2017-04-25 | Stop reason: HOSPADM

## 2017-04-22 RX ADMIN — STANDARDIZED SENNA CONCENTRATE AND DOCUSATE SODIUM 1 TABLET: 8.6; 5 TABLET, FILM COATED ORAL at 08:04

## 2017-04-22 RX ADMIN — PANTOPRAZOLE SODIUM 40 MG: 40 TABLET, DELAYED RELEASE ORAL at 08:04

## 2017-04-22 RX ADMIN — ISOSORBIDE MONONITRATE 30 MG: 30 TABLET, EXTENDED RELEASE ORAL at 11:04

## 2017-04-22 RX ADMIN — STANDARDIZED SENNA CONCENTRATE AND DOCUSATE SODIUM 1 TABLET: 8.6; 5 TABLET, FILM COATED ORAL at 09:04

## 2017-04-22 RX ADMIN — SEVELAMER CARBONATE 800 MG: 800 TABLET, FILM COATED ORAL at 08:04

## 2017-04-22 RX ADMIN — RANOLAZINE 500 MG: 500 TABLET, FILM COATED, EXTENDED RELEASE ORAL at 09:04

## 2017-04-22 RX ADMIN — CARVEDILOL 12.5 MG: 12.5 TABLET, FILM COATED ORAL at 08:04

## 2017-04-22 RX ADMIN — SEVELAMER CARBONATE 800 MG: 800 TABLET, FILM COATED ORAL at 04:04

## 2017-04-22 RX ADMIN — AMIODARONE HYDROCHLORIDE 200 MG: 200 TABLET ORAL at 09:04

## 2017-04-22 RX ADMIN — CARVEDILOL 12.5 MG: 12.5 TABLET, FILM COATED ORAL at 09:04

## 2017-04-22 RX ADMIN — ASPIRIN 81 MG CHEWABLE TABLET 81 MG: 81 TABLET CHEWABLE at 08:04

## 2017-04-22 RX ADMIN — SERTRALINE HYDROCHLORIDE 50 MG: 50 TABLET ORAL at 10:04

## 2017-04-22 RX ADMIN — SEVELAMER CARBONATE 800 MG: 800 TABLET, FILM COATED ORAL at 11:04

## 2017-04-22 RX ADMIN — RANOLAZINE 500 MG: 500 TABLET, FILM COATED, EXTENDED RELEASE ORAL at 11:04

## 2017-04-22 RX ADMIN — AMIODARONE HYDROCHLORIDE 200 MG: 200 TABLET ORAL at 08:04

## 2017-04-22 RX ADMIN — CLOPIDOGREL BISULFATE 75 MG: 75 TABLET ORAL at 08:04

## 2017-04-22 NOTE — PROGRESS NOTES
Nephrology Progress Note    Admit Date: 4/20/2017   LOS: 2 days     SUBJECTIVE:     Follow-up For:  Pt seen and chart reviewed , feels better, denies complaints ,     Scheduled Meds:   amiodarone  200 mg Oral BID    aspirin  81 mg Oral Daily    carvedilol  12.5 mg Oral BID    clopidogrel  75 mg Oral Daily    epoetin yadi (PROCRIT) injection  10,000 Units Intravenous Every Mon, Wed, Fri    heparin (porcine)  2,000 Units Intravenous Once    isosorbide mononitrate  30 mg Oral Daily    pantoprazole  40 mg Oral Daily    ranolazine  500 mg Oral BID    senna-docusate 8.6-50 mg  1 tablet Oral BID    sertraline  50 mg Oral Daily    sevelamer carbonate  800 mg Oral TID WM    [START ON 4/24/2017] vancomycin 1 g in dextrose 5 % 250 mL IVPB (ready to mix system)  1 g Intravenous Q48H     Continuous Infusions:   PRN Meds:sodium chloride, sodium chloride, acetaminophen, albumin human 25%, dextrose 50%, dextrose 50%, glucagon (human recombinant), glucose, glucose, ondansetron, promethazine (PHENERGAN) IVPB    Review of patient's allergies indicates:   Allergen Reactions    Codeine Other (See Comments)     Hallucination    Hydrocodone Other (See Comments)     Impairs vision; AMS    Percocet [oxycodone-acetaminophen] Other (See Comments)     Makes him go crazy      Doxycycline Rash and Other (See Comments)     Vision impairment  Other reaction(s): Unknown    Levaquin [levofloxacin] Rash     Blisters    Xanax [alprazolam] Palpitations     pychosis        Review of Systems :    Constitutional: positive for fatigue and malaise  Eyes: negative  Ears, nose, mouth, throat, and face: negative  Respiratory: positive for dyspnea on exertion  Cardiovascular: negative  Gastrointestinal: negative  Hematologic/lymphatic: negative  Musculoskeletal:negative  Neurological: negative    OBJECTIVE:     Vital Signs (Most Recent)    Temp: 97.5 °F (36.4 °C) (04/22/17 1130)  Pulse: 67 (04/22/17 1130)  Resp: 18 (04/22/17 1130)  BP: (!)  103/58 (04/22/17 1130)  SpO2: 95 % (04/22/17 1130)    Vital Signs Range (Last 24H):    Temp:  [97.5 °F (36.4 °C)-98.7 °F (37.1 °C)]   Pulse:  [67-85]   Resp:  [16-18]   BP: (103-133)/(58-69)   SpO2:  [94 %-98 %]     I & O (Last 24H):    Intake/Output Summary (Last 24 hours) at 04/22/17 1528  Last data filed at 04/22/17 0600   Gross per 24 hour   Intake              490 ml   Output                0 ml   Net              490 ml     Physical Exam:    Gen: WDWN male in no apparent distress  Skin: No rashes or ulcers , multiple healed scabs on both hands   Eyes: Normal conjunctiva and lids,   ENT: Normal hearing with no oropharyngeal lesions  Neck: No JVD, IJ vascath   Chest: Clear with no rales, rhonchi, wheezing with normal effort  CV: Regular with no murmurs, gallops or rubs  Abd: Soft, nontender, no distension, positive bowel sounds  Ext: No cyanosis, clubbing or edema  Neuro : non-focal     Laboratory:    CBC:     Recent Labs  Lab 04/22/17  0531   WBC 14.95*   RBC 3.22*   HGB 10.1*   HCT 29.4*      MCV 91   MCH 31.4*   MCHC 34.4     BMP:     Recent Labs  Lab 04/21/17  0609   GLU 62*      K 3.4*   CL 96   CO2 28   BUN 43*   CREATININE 5.9*   CALCIUM 9.3     CMP:     Recent Labs  Lab 04/21/17  0609   GLU 62*   CALCIUM 9.3   ALBUMIN 2.1*  2.1*   PROT 5.8*      K 3.4*   CO2 28   CL 96   BUN 43*   CREATININE 5.9*   ALKPHOS 115   ALT 49*   *   BILITOT 0.8     LFTs:     Recent Labs  Lab 04/21/17  0609   ALT 49*   *   ALKPHOS 115   BILITOT 0.8   PROT 5.8*   ALBUMIN 2.1*  2.1*     Lab Results   Component Value Date    .0 (H) 10/26/2016    CALCIUM 9.3 04/21/2017    PHOS 5.4 (H) 04/21/2017       Lab Results   Component Value Date    ALBUMIN 2.1 (L) 04/21/2017    ALBUMIN 2.1 (L) 04/21/2017         Diagnostic Results: reviewed     ASSESSMENT/PLAN:          IMPRESSION AND RECOMMENDATIONS: 81-year-old  gentleman seen in f/u for following medical problems     1. End-stage renal  disease on hemodialysis - ( MWF , [facility] ) ,      2. Anemia - multifactorial, he had a surgical procedure about 2 weeks ago. Hemoglobin 10 gms after 3 units pRBC tx ,      3. AMS : likely due to staph bacteremia  , needs contact isolation till sensitivity is back , consult ID , on Vanc  ,      4. Secondary hyperparathyroidism - renal diet, resumed home binders.     5. Coronary artery disease - troponin leak noted, Cards recommended a cadiac stress test , but pt and family declined ,      6. Hypertension - BP controlled,     7. SHPT : renal diet,      Will continue to monitor. Total time spent 40 minutes including time needed to review the records,  patient evaluation, documentation, face-to-face discussion with the patient, his brother, Primary team, Cards ,   more than 50% of the time was spent on coordination of care and counseling.      Fermin Church MD

## 2017-04-22 NOTE — PROGRESS NOTES
Cardiology Progress Note        SUBJECTIVE:     History of Present Illness:  Patient is a 71 y.o. male presents with VF/VT s/p ICD firing. CHF, acute sever anemia.  Had PRBC x2 units with HD today.  Still has mild dyspnea and fatigue      OBJECTIVE:     Vital Signs (Most Recent)  Temp: 97.6 °F (36.4 °C) (04/21/17 1300)  Pulse: 81 (04/21/17 1300)  Resp: 18 (04/21/17 1300)  BP: 137/82 (04/21/17 1300)  SpO2: (!) 94 % (04/21/17 0353)    Vital Signs Range (Last 24H):  Temp:  [97.4 °F (36.3 °C)-99.2 °F (37.3 °C)]   Pulse:  [61-99]   Resp:  [17-20]   BP: ()/()   SpO2:  [92 %-100 %]     Intake/Output last 3 shifts:  I/O last 3 completed shifts:  In: 1711.3 [Blood:1211.3; Other:500]  Out: 2200 [Other:2200]    Intake/Output this shift:       Review of patient's allergies indicates:   Allergen Reactions    Codeine Other (See Comments)     Hallucination    Hydrocodone Other (See Comments)     Impairs vision; AMS    Percocet [oxycodone-acetaminophen] Other (See Comments)     Makes him go crazy      Doxycycline Rash and Other (See Comments)     Vision impairment  Other reaction(s): Unknown    Levaquin [levofloxacin] Rash     Blisters    Xanax [alprazolam] Palpitations     pychosis        Current Facility-Administered Medications   Medication    0.9%  NaCl infusion (for blood administration)    0.9%  NaCl infusion (for blood administration)    acetaminophen tablet 650 mg    albumin human 25% bottle 12.5 g    amiodarone tablet 200 mg    aspirin chewable tablet 81 mg    carvedilol tablet 12.5 mg    clopidogrel tablet 75 mg    dextrose 50% injection 12.5 g    dextrose 50% injection 25 g    epoetin yadi injection 10,000 Units    glucagon (human recombinant) injection 1 mg    glucose chewable tablet 16 g    glucose chewable tablet 24 g    heparin (porcine) injection 2,000 Units    ondansetron injection 4 mg    pantoprazole EC tablet 40 mg    promethazine (PHENERGAN) 6.25 mg in dextrose 5 % 50 mL IVPB     senna-docusate 8.6-50 mg per tablet 1 tablet    sevelamer carbonate tablet 800 mg    vancomycin (VANCOCIN) 1,500 mg in dextrose 5 % 250 mL IVPB     No current facility-administered medications on file prior to encounter.      Current Outpatient Prescriptions on File Prior to Encounter   Medication Sig    acetaminophen (TYLENOL) 500 MG tablet Take 500 mg by mouth every 6 (six) hours as needed for Pain.    acitretin (SORIATANE) 10 MG capsule Take 10 mg by mouth every evening.     amiodarone (PACERONE) 200 MG Tab Take 200 mg by mouth 2 (two) times daily.    aspirin (ECOTRIN) 81 MG EC tablet Take 81 mg by mouth once daily.    atorvastatin (LIPITOR) 40 MG tablet TAKE 1 TABLET BY MOUTH EVERY EVENING    carvedilol (COREG) 12.5 MG tablet Take 1 tablet by mouth 2 (two) times daily.    cetirizine (ZYRTEC) 10 MG tablet Take 10 mg by mouth once daily.    cloNIDine (CATAPRES) 0.3 MG tablet Take 0.3 mg by mouth as needed.    clopidogrel (PLAVIX) 75 mg tablet Take 75 mg by mouth once daily.    docusate sodium (COLACE) 100 MG capsule Take 1 capsule (100 mg total) by mouth 3 (three) times daily as needed for Constipation.    fluticasone (FLONASE) 50 mcg/actuation nasal spray 2 sprays by Each Nare route once daily.    isosorbide mononitrate (IMDUR) 30 MG 24 hr tablet Take 1 tablet (30 mg total) by mouth once daily.    mupirocin (BACTROBAN) 2 % ointment Apply to wound infection 2 times a week for 7 days (Patient taking differently: Apply topically as needed. Apply to wound infection 2 times a week for 7 days)    nitroGLYCERIN (NITROSTAT) 0.4 MG SL tablet Place 1 tablet (0.4 mg total) under the tongue every 5 (five) minutes as needed for Chest pain.    ondansetron (ZOFRAN-ODT) 4 MG TbDL Take 2 tablets (8 mg total) by mouth every 8 (eight) hours as needed.    pantoprazole (PROTONIX) 40 MG tablet TAKE 1 TABLET (40 MG TOTAL) BY MOUTH ONCE DAILY.    predniSONE (DELTASONE) 5 MG tablet TAKE 1 TABLET(S) ORAL (BY MOUTH)  EVERY DAY    pyridoxine (VITAMIN B-6) 200 mg Tab Take 1 tablet by mouth every evening.     quetiapine (SEROQUEL) 25 MG Tab Take 1 tablet (25 mg total) by mouth every evening. Prn sleep    RANEXA 500 mg Tb12 TAKE 1 TABLET TWICE A DAY    sertraline (ZOLOFT) 50 MG tablet Take 50 mg by mouth once daily.    sevelamer carbonate (RENVELA) 800 mg Tab Take 1 tablet (800 mg total) by mouth 3 (three) times daily with meals.    thiamine 100 MG tablet Take 1 tablet (100 mg total) by mouth once daily.    tramadol (ULTRAM) 50 mg tablet Take 1 tablet (50 mg total) by mouth every 6 (six) hours as needed for Pain.       Physical Exam:  Constitutional: He is oriented to person, place, and time. He appears well-nourished.   HENT:   Head: Normocephalic.   Eyes: Pupils are equal, round, and reactive to light.   Neck: Normal carotid pulses and no JVD present. Carotid bruit is not present. No thyromegaly present.   Cardiovascular: Normal rate, regular rhythm, normal heart sounds and normal pulses. No extrasystoles are present. PMI is not displaced. Exam reveals no gallop and no S3.   No murmur heard.  Pulmonary/Chest: Breath sounds normal. No stridor. No respiratory distress.   Crackles on bases   Abdominal: Soft. Bowel sounds are normal. There is no tenderness. There is no rebound.   Musculoskeletal: Normal range of motion.   Neurological: He is alert and oriented to person, place, and time.   Skin: Skin is intact. No rash noted.   Left arm s/p staples of fistula   Psychiatric: His behavior is normal  Laboratory:  Chemistry:   Lab Results   Component Value Date     04/21/2017    K 3.4 (L) 04/21/2017    CL 96 04/21/2017    CO2 28 04/21/2017    BUN 43 (H) 04/21/2017    CREATININE 5.9 (H) 04/21/2017    CALCIUM 9.3 04/21/2017     Cardiac Markers:   Lab Results   Component Value Date    CKMB 0.9 10/08/2012    TROPONINI 0.627 (H) 04/21/2017    TROPONINI 0.04 10/08/2012     Cardiac Markers (Last 3):   Lab Results   Component Value  Date    CKMB 0.9 10/08/2012    CKMB 2.4 08/03/2011    CKMB 3.0 08/01/2011    TROPONINI 0.627 (H) 04/21/2017    TROPONINI 0.627 (H) 04/21/2017    TROPONINI 0.657 (H) 04/20/2017    TROPONINI 0.04 10/08/2012    TROPONINI 0.21 (H) 08/03/2011    TROPONINI 0.92 (H) 08/01/2011     CBC:   Lab Results   Component Value Date    WBC 14.36 (H) 04/21/2017    HGB 10.1 (L) 04/21/2017    HCT 23.7 (L) 04/21/2017    HCT 35 (L) 12/27/2016    MCV 93 04/21/2017     04/21/2017     Lipids:   Lab Results   Component Value Date    CHOL 76 (L) 04/21/2017    TRIG 103 04/21/2017    HDL 11 (L) 04/21/2017     Coagulation:   Lab Results   Component Value Date    INR 1.0 03/12/2017    APTT 26.8 03/12/2017       Diagnostic Results:  ECG: Reviewed  X-Ray: Reviewed  US: Reviewed  CT: Reviewed  Echo: Reviewed      ASSESSMENT/PLAN:     Patient Active Problem List   Diagnosis    Organ transplant candidate    Hypertension, renal    Rheumatoid arthritis involving both hands with negative rheumatoid factor    Coronary artery disease    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    Secondary hyperparathyroidism of renal origin    Refractive error - Both Eyes    Chronic ischemic heart disease    Mixed hyperlipidemia    Ischemic cardiomyopathy    Abnormal ECG    Skin cancer of scalp    ESRD (end stage renal disease)    Knee pain    Osteoporosis    Essential hypertension    A-V fistula    Problem with dialysis shunt    JEROME (obstructive sleep apnea)    Chronic systolic congestive heart failure    GERD (gastroesophageal reflux disease)    Syncope    LVH (left ventricular hypertrophy)    Pulmonary HTN    Paroxysmal atrial flutter    Cervical spinal stenosis    Atrial flutter with rapid ventricular response    Back pain    Complication of AV dialysis fistula    Current chronic use of systemic steroids    SVT (supraventricular tachycardia)    NSVT (nonsustained ventricular tachycardia)    Hematoma of  arm    Renal failure    Osteoarthritis of right knee    S/P implantation of automatic cardioverter/defibrillator (AICD)    Elevated troponin I level    Nausea & vomiting    Closed left hip fracture    Fall (on) (from) other stairs and steps, initial encounter    Anemia    Elevated LFTs    Leukocytosis     sepsis      Per presentation, he had VT/VFIB related syncope and subsequent ICD firing.  Acute anemia  Elevated troponin, post ICD firing  Elevated BNP acute anemia related  CAD  CHF rEF 20% decompensated  S/p S-ICD  ESRD on HD  hemarrhoid  H/o excessive bleeding when on anticoagulation Rx  AFL s/p CTI ablation in     Plan:  Continue coreg, amiodarone 200 mg bid, ASA and Plavix.  HD per nephro  DASH and fluid restriction  Reschedule MPI tomorrow after anemia and CHF improves

## 2017-04-22 NOTE — PROGRESS NOTES
"[facility] Hospital Medicine  Progress Note    Patient Name: Quinn Hutchins  MRN: [id number]  Patient Class: IP- Inpatient   Admission Date: 4/20/2017  Length of Stay: 2 days  Attending Physician: Aristeo De Jesus MD  Primary Care Provider: Jameel Almaguer MD        Subjective:     Principal Problem:Severe sepsis    HPI:  Quinn Hutchins is a pleasant 71-year-old CM who presented to the ED today after "passing out in my car".  Patient states he was riding home with his wife when he asked for something to drink "then passed out".  Denies H/A, lightheadedness/dizziness, visual changes.  He has a history of end-stage renal disease on hemodialysis, Monday Wednesday Friday, [facility] hemodialysis unit under care of Dr. Kaylie Reilly.   On workup he has significant anemia with a hemoglobin of 7.6 noted. His hemoglobin was about 12 g a month ago. According to the patient's wife patient had a surgery on his left arm AV fistula, most likely clotted. He has an IJ Vas-Cath. Patient is oriented to person, place, and time,d but is very sleepy upon exam. He also has been having some gastrointestinal symptoms in the last few days. C/O diarrhea x 3 days, which occurred 2 days ago.  Currently he denies N/V/D.  He is due for dialysis tomorrow, blood cultures were ordered in the emergency room, he is also receiving 1 unit of packed RBC. Mild elevation of troponin I noted. He will admitted to [facility] for medical management. Dr. Church has been consulted and will follow. Cardiology consulted given extensive history with elevated troponin.      Hospital Course:  4-21- Underwent HD today and received 2 units of PRBC, with increase in Hgb to 10.1.  Peripheral BC + for gram positive cocci and clusters resembling staph.  Source may be Left IJ Vas Cath vs Left Heel wound.  Will check 1 set of BC from Vas Cath today.  Check Xray of Left foot to r/o as infectious source.  Will start on IV Vanc- pharmacy to dose. ECHO today does not show " evidence of Endocarditis. Tmax 99.2  4/22- Patient seen and examined today. H/H stable at 10/29 after transfusion. Blood cx drawn from vas cath yesterday show no growth to date. Continue current management. Consult ID.       Interval History: Patient seen and examined today. H/H stable at 10/29 after transfusion. Blood cx drawn from vas cath yesterday show no growth to date. Continue current management. Consult ID.    Review of Systems   Constitutional: Positive for fatigue. Negative for chills, diaphoresis and fever.   HENT: Negative for hearing loss, mouth sores, sore throat, tinnitus and trouble swallowing.    Eyes: Negative for pain, discharge and redness.   Respiratory: Negative for apnea, cough, choking, chest tightness, shortness of breath, wheezing and stridor.    Cardiovascular: Negative for chest pain, palpitations and leg swelling.   Gastrointestinal: Negative for abdominal distention, abdominal pain, blood in stool, constipation, diarrhea, nausea, rectal pain and vomiting.   Endocrine: Negative for cold intolerance, heat intolerance, polydipsia, polyphagia and polyuria.   Genitourinary: Negative for difficulty urinating, dysuria, flank pain, frequency, hematuria and urgency.        ESRD on HD MWF   Musculoskeletal: Negative for arthralgias, back pain, gait problem, joint swelling, neck pain and neck stiffness.   Skin: Positive for wound. Negative for color change and rash.        Dry skin with Diffuse scabbing noted to entire body. Incision to LUE KATHERINE with sutures/staples CDI + erythema   Allergic/Immunologic: Negative for food allergies.   Neurological: Positive for weakness. Negative for dizziness, tremors, seizures, syncope, speech difficulty, light-headedness and headaches.   Hematological: Negative for adenopathy. Does not bruise/bleed easily.   Psychiatric/Behavioral: Positive for confusion. Negative for agitation. The patient is not nervous/anxious.    All other systems reviewed and are  negative.    Objective:     Vital Signs (Most Recent):  Temp: 97.5 °F (36.4 °C) (04/22/17 1130)  Pulse: 67 (04/22/17 1130)  Resp: 18 (04/22/17 1130)  BP: (!) 103/58 (04/22/17 1130)  SpO2: 95 % (04/22/17 1130) Vital Signs (24h Range):  Temp:  [97.5 °F (36.4 °C)-98.7 °F (37.1 °C)] 97.5 °F (36.4 °C)  Pulse:  [67-85] 67  Resp:  [16-18] 18  SpO2:  [94 %-98 %] 95 %  BP: (103-133)/(58-69) 103/58     Weight: 76.8 kg (169 lb 5 oz)  Body mass index is 24.29 kg/(m^2).    Intake/Output Summary (Last 24 hours) at 04/22/17 1604  Last data filed at 04/22/17 0600   Gross per 24 hour   Intake              490 ml   Output                0 ml   Net              490 ml      Physical Exam   Constitutional: He is oriented to person, place, and time. He appears well-developed and well-nourished. No distress.   HENT:   Head: Normocephalic and atraumatic.   Mouth/Throat: Oropharynx is clear and moist.   Eyes: Conjunctivae and EOM are normal. Pupils are equal, round, and reactive to light. Left eye exhibits no discharge.   Neck: Normal range of motion. Neck supple. No JVD present.   Left IJ Vas Cath site WNL with DSG CDI   Cardiovascular: Normal rate, regular rhythm and intact distal pulses.  Exam reveals no gallop and no friction rub.    Murmur heard.  Pulmonary/Chest: Effort normal and breath sounds normal. No stridor. No respiratory distress. He has no wheezes. He has no rales. He exhibits no tenderness.   Comfortable on 2L NC   Abdominal: Soft. Bowel sounds are normal. He exhibits no distension and no mass. There is no tenderness. There is no rebound and no guarding.   Musculoskeletal: Normal range of motion. He exhibits no edema or tenderness.   Neurological: He is alert and oriented to person, place, and time. No cranial nerve deficit.   Falls asleep quickly.  MAEW. Follows all commands appropriately. Some confusion noted.  Poor historian.   Skin: Skin is warm and dry. No rash noted. He is not diaphoretic. No erythema.        Dry skin  with diffuse scabbing noted to entire body.  Incision to LUE shunt KATHERINE, sutures/staples intact.  + erythema/ecchymosis, no active drainage.  Left hip incision healed without s/s of infection. Left heel nickel size wound with scab intact, + ecchymosis, surrounding skin erythemic, no foul odor or active drainage.   Psychiatric: He has a normal mood and affect. His behavior is normal. Judgment and thought content normal.   Nursing note and vitals reviewed.      Significant Labs: All pertinent labs within the past 24 hours have been reviewed.    Significant Imaging:   Imaging Results         X-Ray Foot 2 View Left (Final result) Result time:  04/21/17 21:59:23    Final result by Taisha Vergara MD (04/21/17 21:59:23)    Impression:      No plain film evidence of osteomyelitis.      Electronically signed by: TAISHA VERGARA MD  Date:     04/21/17  Time:    21:59     Narrative:    EXAM: XR FOOT 2 VIEW LEFT    CLINICAL HISTORY:  Left Heel wound, + Blood cultures, rule out as infectious source    FINDINGS:  Negative for fracture or dislocation.  No significant arthritic changes.  Osteopenia.    Bandage material present plantar aspect of the foot with small amount of soft tissue edema.            US Carotid Bilateral (Final result) Result time:  04/20/17 19:28:59    Final result by Yogesh Vergara MD (04/20/17 19:28:59)    Impression:         1.  Calcific atherosclerotic plaque in the proximal right internal carotid artery causing a 20-30% stenosis by flow velocity measurements.  2.  Calcific atherosclerotic plaque in the proximal left internal carotid artery causing a 20% or less stenosis by velocity measurements    Stenosis of  % - validated velocity measurements with angiographic measurements, velocity criteria are extrapolated from diameter data as defined by the Society of Radiologists in Ultrasound Consensus Conference Radiology 2003; 229;340-346.      Electronically signed by: YOGESH VERGARA MD  Date:      04/20/17  Time:    19:28     Narrative:    Exam: Carotid ultrasound    Clinical History:    Syncope     Findings:     Sonographic evaluation of the carotid systems was performed.     There is calcific plaque in the bulb of the right internal carotid artery narrowing the lumen of the vessel.  There is calcific atherosclerotic plaque in the bulb of the left internal carotid artery narrowing the lumen of the vessel.    The peak systolic velocity in the right internal carotid artery was approximately 134 cm/sec.  The right ICA to CCA peak systolic velocity ratio is 3.0.    The peak systolic velocity in the left internal carotid artery was approximately 94 cm/sec.  The left ICA to CCA peak site laceration is 1.3.    Antegrade flow noted in both vertebral arteries.            CT Head Without Contrast (Final result) Result time:  04/20/17 15:28:32    Final result by David Gee MD (04/20/17 15:28:32)    Impression:      Mild generalized atrophy with white matter degeneration.  Intracranial vascular calcification.  No acute findings.        All CT scans at this facility use dose modulation, iterative reconstruction and/or weight based dosing when appropriate to reduce radiation dose to as low as reasonably achievable.       Electronically signed by: DAVID GEE MD  Date:     04/20/17  Time:    15:28     Narrative:    CT HEAD WITHOUT CONTRAST     History:  Syncope.  Dizziness.  TIA.    Technique:  Noncontrast CT of the brain. Comparison with 03/12/2017.    Findings:  The ventricles are dilated consistent with generalized atrophy. Associated age-related white matter degeneration is present.     No significant acute findings are noted.            X-Ray Chest AP Portable (Final result) Result time:  04/20/17 15:04:54    Final result by Sami Vergara MD (04/20/17 15:04:54)    Impression:      No significant change.  Findings most consistent with mild CHF.      Electronically signed by: SAMI VERGARA MD  Date:      04/20/17  Time:    15:04     Narrative:    EXAM: XR CHEST AP PORTABLE    CLINICAL HISTORY: syncope.    COMPARISON STUDIES: March 12, 2017    FINDINGS:  Stable cardiomegaly.  Left chest pacer vs. AICD from inferior approach.  Dual-lumen left IJ unchanged.    Minimal vascular congestion without overt edema.  No pleural effusions evident.                 Assessment/Plan:      *  sepsis  - Peripheral BC positive for gram + cocci and clusters  - Will check BC from Left IJ Vas Cath Today  - WBC elevated to 14K from 12K  - Source may be Left IJ Vas Cath versus Left heel wound  - Check Left Foot Xray to r/o infectious source  - Check lactic acid today  - Continue Vancomycin- pharmacy to dose  - Currently hemodynamically stable  - Consult infectious disease      Hypertension, renal  - BP suboptimal  - Continue Coreg  -  Monitor      ESRD (end stage renal disease)  - [place] [facility] Dr. Reilly  - Tolerated HD today  - Dr. 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  Shunt currently not functioning  - Has Left IJ Vas Cath      Chronic systolic congestive heart failure  - ECHO shows EF of 25%  - CXR reviewed  - BNP >4900 (h/o ESRD on HD MWF)  - Continue Coreg   - Plan for HD MWF  - Cardiology following    Syncope  -  No additional episodes since admission  -  Cardiology Consulted- Feels patient had Vtach/Fib related syncope with AICD firing  -  ECHO shows EF of 20-25%  -  Carotid US shows Calcific atherosclerotic plaque in the proximal right internal carotid artery causing a 20-30% stenosis, Left has 20% stenosis  -  Orthostatics q shift  -  Serial troponins stable at 0.627 (elevation likely secondary to ICD firing)  -  CT head shows no significant findings  - Monitor      Paroxysmal atrial flutter  - Currently rate controlled and in NSR  - Continue Amio  - Monitor  - Not on long term OAC      Elevated troponin I level  - Troponin elevated to 0.657, trending down  - 12 lead EKG reviewed  -  D/W Dr. De, likely secondary to Vtach/Fib AICD firing  - Serial Troponins stable   - Continue home ASA/Plavix  - Hold off on resuming Lipitor secondary to elevated LFTs  - Lipid Panel Reviewed  - Family does not want NM stress test today.     Anemia  - Secondary to ESRD  - Could have contributed to syncopal episode  - Rceived 1 Unit PRBCs Thursday and 2 Units with HD yesterday  - Hgb stable at 10.1  - Continue Epo  - CBC in AM        Elevated LFTs  - Trending down  - Hold home Statin for now        Leukocytosis  - Patient has vas cath, blood cx drawn from vas michael yesterday, NGTD  - BC + staph  - Repeat CBC in AM  - CXR does not reveal infectious process  - Temp of 100.1 noted    VTE Risk Mitigation         Ordered     Place sequential compression device  Until discontinued      04/20/17 1832     Medium Risk of VTE  Once      04/20/17 1828          Desmond Wilkes NP  Department of Hospital Medicine   [facility]

## 2017-04-22 NOTE — PT/OT/SLP EVAL
"Physical Therapy  Evaluation    Quinn Hutchins   MRN: [id number]   Admitting Diagnosis: <principal problem not specified>    PT Received On: 04/22/17  PT Start Time: 0955     PT Stop Time: 1015    PT Total Time (min): 20 min       Billable Minutes:  Evaluation 20    Diagnosis: <principal problem not specified>      Past Medical History:   Diagnosis Date    A-V fistula     RIGHT UPPER ARM    Anemia in chronic kidney disease 8/16/2012    Anticoagulant long-term use     Arthritis     Atrial flutter     Cancer     RIGHT RENAL CELL; SKIN CA    Chronic ischemic heart disease 8/20/2013    Chronic systolic congestive heart failure 4/12/2016    Coronary artery disease 8/16/2012    Depression     Dialysis patient     M-W-F    Elbow fracture     Encounter for blood transfusion     ESRD (end stage renal disease) on dialysis 8/16/2012    Fractured coccyx     Gout, unspecified 8/16/2012    Heel bone fracture     Hypertension 8/20/2013    Hypertension, renal 8/16/2012    Ischemic cardiomyopathy 8/20/2013    Kidney stones     Macular degeneration     Mixed hyperlipidemia 8/20/2013    Myocardial infarction     Neuromuscular disorder     JEROME (obstructive sleep apnea) 4/12/2016    JEROME on CPAP     Paroxysmal atrial flutter 5/12/2016    Personal history of skin cancer     Rheumatoid arthritis 8/16/2012    Secondary hyperparathyroidism of renal origin 8/16/2012      Past Surgical History:   Procedure Laterality Date    AV FISTULA PLACEMENT      REVISION X2    CARDIAC CATHETERIZATION      JOINT REPLACEMENT Right     hip    PARTIAL NEPHRECTOMY Right 2008    for renal cell cancer - "stage 1" per patient.    SKIN BIOPSY      TONSILLECTOMY         Referring physician: Dr De Jesus  Date referred to PT: 4/21/17    General Precautions: Standard, fall  Orthopedic Precautions:     Braces:              Patient History:  Lives With: spouse  Living Arrangements: house  Home Layout: Able to live on 1st " floor  Transportation Available: car  Equipment Currently Used at Home: walker, rolling, wheelchair, bedside commode, shower chair  DME owned (not currently used): none    Previous Level of Function:  Ambulation Skills: needs device and assist (Ambulate short distances with wife or therapist)  Transfer Skills: needs device  ADL Skills: needs device and assist  Work/Leisure Activity: unable to perform    Subjective:  Communicated with [facility] and [facility] prior to session.    Chief Complaint: my hip is hurting   Patient goals: to increase independence    Pain Rating:  (not able to rate pain, complain of right hip pain )                    Objective:   Patient found with: telemetry, peripheral IV, oxygen     Cognitive Exam:  Oriented to: Person    Follows Commands/attention: Follows one-step commands  Communication: clear/fluent  Safety awareness/insight to disability: impaired    Physical Exam:  Postural examination/scapula alignment: Rounded shoulder and Kyphosis    Skin integrity: Wound left heel, scabbing bilateral arms, legs and head  Edema: Moderate Left lower extremity    Sensation:   Intact    Upper Extremity Range of Motion:  Right Upper Extremity: WFL  Left Upper Extremity: WFL    Upper Extremity Strength:  Right Upper Extremity: WFL  Left Upper Extremity: WFL    Lower Extremity Range of Motion:  Right Lower Extremity: WFL  Left Lower Extremity: Deficits: decreased hip flexion and knee flexion secondary to stiffness and pain    Lower Extremity Strength:  Right Lower Extremity: WFL  Left Lower Extremity: WFL except hip flexion     Fine motor coordination:  Intact    Gross motor coordination: WFL    Functional Mobility:  Bed Mobility:  Rolling/Turning to Left: Minimum assistance  Rolling/Turning Right: Minimum assistance  Supine to Sit: Minimum Assistance  Sit to Supine: Minimum Assistance    Transfers:  Sit <> Stand Assistance: Moderate Assistance  Sit <> Stand Assistive Device: Rolling Walker    Gait:   Gait  Distance:  (20')  Assistance 1: Minimum assistance  Gait Assistive Device: Rolling walker  Gait Pattern: swing-through gait  Gait Deviation(s): decreased kiko, increased time in double stance, decreased step length, forward lean (increased forward flexion with fatique)    Stairs:      Balance:   Static Sit: GOOD: Takes MODERATE challenges from all directions  Dynamic Sit: FAIR+: Maintains balance through MINIMAL excursions of active trunk motion  Static Stand: FAIR: Maintains without assist but unable to take challenges  Dynamic stand: FAIR: Needs CONTACT GUARD during gait    Therapeutic Activities and Exercises:      AM-PAC 6 CLICK MOBILITY  How much help from another person does this patient currently need?   1 = Unable, Total/Dependent Assistance  2 = A lot, Maximum/Moderate Assistance  3 = A little, Minimum/Contact Guard/Supervision  4 = None, Modified Hill/Independent          AM-PAC Raw Score CMS G-Code Modifier Level of Impairment Assistance   6 % Total / Unable   7 - 9 CM 80 - 100% Maximal Assist   10 - 14 CL 60 - 80% Moderate Assist   15 - 19 CK 40 - 60% Moderate Assist   20 - 22 CJ 20 - 40% Minimal Assist   23 CI 1-20% SBA / CGA   24 CH 0% Independent/ Mod I     Patient left supine with all lines intact, call button in reach and bed alarm on.    Assessment:   Quinn Hutchins is a 71 y.o. male with a medical diagnosis of <principal problem not specified> and presents with decreased functional mobility, gait instability and weakness.    Rehab identified problem list/impairments: Rehab identified problem list/impairments: weakness, impaired endurance, impaired functional mobilty, impaired balance, gait instability, decreased lower extremity function, pain    Rehab potential is good.    Activity tolerance: Good    Discharge recommendations: Discharge Facility/Level Of Care Needs: nursing facility, skilled (SNF versus HH)     Barriers to discharge: Barriers to Discharge: Decreased caregiver  support    Equipment recommendations: Equipment Needed After Discharge: none     GOALS:   Physical Therapy Goals        Problem: Physical Therapy Goal    Goal Priority Disciplines Outcome Goal Variances Interventions   Physical Therapy Goal     PT/OT, PT Ongoing (interventions implemented as appropriate)               PLAN:    Patient to be seen  (pt will be seen a min of 5-7 days as tolerated) to address the above listed problems via gait training, therapeutic activities, therapeutic exercises  Plan of Care expires: 04/29/17  Plan of Care reviewed with: patient, spouse          Michelle Ambrosio, PT  04/22/2017

## 2017-04-22 NOTE — PLAN OF CARE
Problem: Patient Care Overview  Goal: Plan of Care Review  Outcome: Ongoing (interventions implemented as appropriate)  POC discussed w/patient and wife, verbalized understanding. NSR on monitor. VSS. Voids per urinal . Patient turns with assist in bed. No c/o of pain. Heels floated. Left heel dressing CDI. Fall precautions in place, bed alarm on.

## 2017-04-22 NOTE — PROGRESS NOTES
[facility] [place] called with positive blood culture results. Positive for gram + cocci in clusters. Reported to Dr Gautam. New orders coming.

## 2017-04-22 NOTE — SUBJECTIVE & OBJECTIVE
Interval History: Patient seen and examined today. H/H stable at 10/29 after transfusion. Blood cx drawn from vas cath yesterday show no growth to date. Continue current management. Consult ID.    Review of Systems   Constitutional: Positive for fatigue. Negative for chills, diaphoresis and fever.   HENT: Negative for hearing loss, mouth sores, sore throat, tinnitus and trouble swallowing.    Eyes: Negative for pain, discharge and redness.   Respiratory: Negative for apnea, cough, choking, chest tightness, shortness of breath, wheezing and stridor.    Cardiovascular: Negative for chest pain, palpitations and leg swelling.   Gastrointestinal: Negative for abdominal distention, abdominal pain, blood in stool, constipation, diarrhea, nausea, rectal pain and vomiting.   Endocrine: Negative for cold intolerance, heat intolerance, polydipsia, polyphagia and polyuria.   Genitourinary: Negative for difficulty urinating, dysuria, flank pain, frequency, hematuria and urgency.        ESRD on HD MWF   Musculoskeletal: Negative for arthralgias, back pain, gait problem, joint swelling, neck pain and neck stiffness.   Skin: Positive for wound. Negative for color change and rash.        Dry skin with Diffuse scabbing noted to entire body. Incision to LUE KATHERINE with sutures/staples CDI + erythema   Allergic/Immunologic: Negative for food allergies.   Neurological: Positive for weakness. Negative for dizziness, tremors, seizures, syncope, speech difficulty, light-headedness and headaches.   Hematological: Negative for adenopathy. Does not bruise/bleed easily.   Psychiatric/Behavioral: Positive for confusion. Negative for agitation. The patient is not nervous/anxious.    All other systems reviewed and are negative.    Objective:     Vital Signs (Most Recent):  Temp: 97.5 °F (36.4 °C) (04/22/17 1130)  Pulse: 67 (04/22/17 1130)  Resp: 18 (04/22/17 1130)  BP: (!) 103/58 (04/22/17 1130)  SpO2: 95 % (04/22/17 1130) Vital Signs (24h  Range):  Temp:  [97.5 °F (36.4 °C)-98.7 °F (37.1 °C)] 97.5 °F (36.4 °C)  Pulse:  [67-85] 67  Resp:  [16-18] 18  SpO2:  [94 %-98 %] 95 %  BP: (103-133)/(58-69) 103/58     Weight: 76.8 kg (169 lb 5 oz)  Body mass index is 24.29 kg/(m^2).    Intake/Output Summary (Last 24 hours) at 04/22/17 1604  Last data filed at 04/22/17 0600   Gross per 24 hour   Intake              490 ml   Output                0 ml   Net              490 ml      Physical Exam   Constitutional: He is oriented to person, place, and time. He appears well-developed and well-nourished. No distress.   HENT:   Head: Normocephalic and atraumatic.   Mouth/Throat: Oropharynx is clear and moist.   Eyes: Conjunctivae and EOM are normal. Pupils are equal, round, and reactive to light. Left eye exhibits no discharge.   Neck: Normal range of motion. Neck supple. No JVD present.   Left IJ Vas Cath site WNL with DSG CDI   Cardiovascular: Normal rate, regular rhythm and intact distal pulses.  Exam reveals no gallop and no friction rub.    Murmur heard.  Pulmonary/Chest: Effort normal and breath sounds normal. No stridor. No respiratory distress. He has no wheezes. He has no rales. He exhibits no tenderness.   Comfortable on 2L NC   Abdominal: Soft. Bowel sounds are normal. He exhibits no distension and no mass. There is no tenderness. There is no rebound and no guarding.   Musculoskeletal: Normal range of motion. He exhibits no edema or tenderness.   Neurological: He is alert and oriented to person, place, and time. No cranial nerve deficit.   Falls asleep quickly.  MAEW. Follows all commands appropriately. Some confusion noted.  Poor historian.   Skin: Skin is warm and dry. No rash noted. He is not diaphoretic. No erythema.        Dry skin with diffuse scabbing noted to entire body.  Incision to LUE shunt KATHERINE, sutures/staples intact.  + erythema/ecchymosis, no active drainage.  Left hip incision healed without s/s of infection. Left heel nickel size wound with  scab intact, + ecchymosis, surrounding skin erythemic, no foul odor or active drainage.   Psychiatric: He has a normal mood and affect. His behavior is normal. Judgment and thought content normal.   Nursing note and vitals reviewed.      Significant Labs: All pertinent labs within the past 24 hours have been reviewed.    Significant Imaging:   Imaging Results         X-Ray Foot 2 View Left (Final result) Result time:  04/21/17 21:59:23    Final result by Taisha Vergara MD (04/21/17 21:59:23)    Impression:      No plain film evidence of osteomyelitis.      Electronically signed by: TAISHA VERGARA MD  Date:     04/21/17  Time:    21:59     Narrative:    EXAM: XR FOOT 2 VIEW LEFT    CLINICAL HISTORY:  Left Heel wound, + Blood cultures, rule out as infectious source    FINDINGS:  Negative for fracture or dislocation.  No significant arthritic changes.  Osteopenia.    Bandage material present plantar aspect of the foot with small amount of soft tissue edema.            US Carotid Bilateral (Final result) Result time:  04/20/17 19:28:59    Final result by Yogesh Vergara MD (04/20/17 19:28:59)    Impression:         1.  Calcific atherosclerotic plaque in the proximal right internal carotid artery causing a 20-30% stenosis by flow velocity measurements.  2.  Calcific atherosclerotic plaque in the proximal left internal carotid artery causing a 20% or less stenosis by velocity measurements    Stenosis of  % - validated velocity measurements with angiographic measurements, velocity criteria are extrapolated from diameter data as defined by the Society of Radiologists in Ultrasound Consensus Conference Radiology 2003; 229;340-346.      Electronically signed by: YOGESH VERGARA MD  Date:     04/20/17  Time:    19:28     Narrative:    Exam: Carotid ultrasound    Clinical History:    Syncope     Findings:     Sonographic evaluation of the carotid systems was performed.     There is calcific plaque in the bulb of the right internal  carotid artery narrowing the lumen of the vessel.  There is calcific atherosclerotic plaque in the bulb of the left internal carotid artery narrowing the lumen of the vessel.    The peak systolic velocity in the right internal carotid artery was approximately 134 cm/sec.  The right ICA to CCA peak systolic velocity ratio is 3.0.    The peak systolic velocity in the left internal carotid artery was approximately 94 cm/sec.  The left ICA to CCA peak site laceration is 1.3.    Antegrade flow noted in both vertebral arteries.            CT Head Without Contrast (Final result) Result time:  04/20/17 15:28:32    Final result by David Gee MD (04/20/17 15:28:32)    Impression:      Mild generalized atrophy with white matter degeneration.  Intracranial vascular calcification.  No acute findings.        All CT scans at this facility use dose modulation, iterative reconstruction and/or weight based dosing when appropriate to reduce radiation dose to as low as reasonably achievable.       Electronically signed by: DAVID GEE MD  Date:     04/20/17  Time:    15:28     Narrative:    CT HEAD WITHOUT CONTRAST     History:  Syncope.  Dizziness.  TIA.    Technique:  Noncontrast CT of the brain. Comparison with 03/12/2017.    Findings:  The ventricles are dilated consistent with generalized atrophy. Associated age-related white matter degeneration is present.     No significant acute findings are noted.            X-Ray Chest AP Portable (Final result) Result time:  04/20/17 15:04:54    Final result by Sami Vergara MD (04/20/17 15:04:54)    Impression:      No significant change.  Findings most consistent with mild CHF.      Electronically signed by: SAMI VERGARA MD  Date:     04/20/17  Time:    15:04     Narrative:    EXAM: XR CHEST AP PORTABLE    CLINICAL HISTORY: syncope.    COMPARISON STUDIES: March 12, 2017    FINDINGS:  Stable cardiomegaly.  Left chest pacer vs. AICD from inferior approach.  Dual-lumen left IJ  unchanged.    Minimal vascular congestion without overt edema.  No pleural effusions evident.

## 2017-04-22 NOTE — CONSULTS
Clinical Pharmacy Consult Note: Vancomycin     Pharmacy consulted by Aliza Branham NP, to dose vancomycin for treatment of sepsis.     Estimated Creatinine Clearance: 12.2 mL/min (based on Cr of 5.9).   ESRD w/ HD on MWF.     BUN: 43  Ideal BW: 73 Kg <--- will use for dosing weight   Actual BW: 76.8 Kg     WBC: 14.36  Tmax/24h: 99.2   Cultures:  Blood on 4.20- G+ cocci in clusters resembling Staph x 2 tubes.      Based on Estimated Creatinine Clearance: 12.2 mL/min (based on Cr of 5.9). and weight of 73 Kg, pharmacy will initiate vancomycin 1500 mg x 1 loading dose followed by dosing per dialysis protocol.     A placeholder only dose has been entered for 1000 mg IV every 48 hours.     Random vancomycin level due 04/22/17 @ 1900.     Pharmacy will continue to follow patients clinical status, renal function, C&S, and adjust dose as necessary to maintain trough levels between 15 and 20 mcg/mL.     Thank you for allowing us to participate in this patient's care.     Michelle Henao   4/21/2017 7:43 PM

## 2017-04-22 NOTE — PLAN OF CARE
Problem: Patient Care Overview  Goal: Plan of Care Review  Outcome: Ongoing (interventions implemented as appropriate)  Spouse is aware of plan of care to continue monitoring vitals, labs, and metal status.  Pt. Has had no complaints today.  Will continue to monitor.

## 2017-04-22 NOTE — PLAN OF CARE
Problem: Physical Therapy Goal  Goal: Physical Therapy Goal  Outcome: Ongoing (interventions implemented as appropriate)  Goals to be achieved within 7 days  1.  Pt will perform bed mobility SPV  2.  Pt will t/f sit=stand CGA from bed/chair  3. Pt will ambulate 100' min A consistently throughout demo increased step length and upright trunk  4.  Pt will tolerate up in chair with good safety awareness 60 minutes

## 2017-04-22 NOTE — PROGRESS NOTES
Vancomycin Progress Notes:    Wbc = 14.95   Scr = 5.9  Tmax = 98.7 F  Dx: sepsis  Dosing per pre-HD levels on MWF schedule  Random ordered for 1900 tonight  Changing this to 0430 for monday morning prior to Dialysis  /Michelle Francois [facility] 4/22/2017 12:46 PM

## 2017-04-22 NOTE — NURSING
Dressing change performed to left subclavian catheter due to dressing becoming loose.  Dressing changed done using sterile technique.  Old dressing removed that had a moderate amount of sangenous drainage on it.  Cleansed per protocol and biopatch placed along with occlusive dressing.  Pt. Tolerated well.  Pt/family advised that the dressing needs to stay clean and covered at all times due to risk of infection and to notify nursing if noticed dressing dislodge.  Pt/family verbalized understanding.

## 2017-04-23 LAB
ANION GAP SERPL CALC-SCNC: 13 MMOL/L
BACTERIA BLD CULT: NORMAL
BUN SERPL-MCNC: 54 MG/DL
CALCIUM SERPL-MCNC: 10.4 MG/DL
CHLORIDE SERPL-SCNC: 96 MMOL/L
CO2 SERPL-SCNC: 24 MMOL/L
CREAT SERPL-MCNC: 6.1 MG/DL
EST. GFR  (AFRICAN AMERICAN): 10 ML/MIN/1.73 M^2
EST. GFR  (NON AFRICAN AMERICAN): 9 ML/MIN/1.73 M^2
GLUCOSE SERPL-MCNC: 87 MG/DL
POTASSIUM SERPL-SCNC: 4 MMOL/L
SODIUM SERPL-SCNC: 133 MMOL/L

## 2017-04-23 PROCEDURE — 27000221 HC OXYGEN, UP TO 24 HOURS

## 2017-04-23 PROCEDURE — 25000003 PHARM REV CODE 250: Performed by: INTERNAL MEDICINE

## 2017-04-23 PROCEDURE — 21400001 HC TELEMETRY ROOM

## 2017-04-23 PROCEDURE — 25000003 PHARM REV CODE 250: Performed by: NURSE PRACTITIONER

## 2017-04-23 PROCEDURE — 94761 N-INVAS EAR/PLS OXIMETRY MLT: CPT

## 2017-04-23 PROCEDURE — 97110 THERAPEUTIC EXERCISES: CPT

## 2017-04-23 PROCEDURE — 99231 SBSQ HOSP IP/OBS SF/LOW 25: CPT | Mod: ,,, | Performed by: INTERNAL MEDICINE

## 2017-04-23 PROCEDURE — 97116 GAIT TRAINING THERAPY: CPT

## 2017-04-23 PROCEDURE — 80048 BASIC METABOLIC PNL TOTAL CA: CPT

## 2017-04-23 PROCEDURE — 99233 SBSQ HOSP IP/OBS HIGH 50: CPT | Mod: ,,, | Performed by: INTERNAL MEDICINE

## 2017-04-23 RX ADMIN — AMIODARONE HYDROCHLORIDE 200 MG: 200 TABLET ORAL at 08:04

## 2017-04-23 RX ADMIN — ASPIRIN 81 MG CHEWABLE TABLET 81 MG: 81 TABLET CHEWABLE at 08:04

## 2017-04-23 RX ADMIN — CLOPIDOGREL BISULFATE 75 MG: 75 TABLET ORAL at 08:04

## 2017-04-23 RX ADMIN — CARVEDILOL 12.5 MG: 12.5 TABLET, FILM COATED ORAL at 08:04

## 2017-04-23 RX ADMIN — RANOLAZINE 500 MG: 500 TABLET, FILM COATED, EXTENDED RELEASE ORAL at 09:04

## 2017-04-23 RX ADMIN — STANDARDIZED SENNA CONCENTRATE AND DOCUSATE SODIUM 1 TABLET: 8.6; 5 TABLET, FILM COATED ORAL at 08:04

## 2017-04-23 RX ADMIN — PANTOPRAZOLE SODIUM 40 MG: 40 TABLET, DELAYED RELEASE ORAL at 08:04

## 2017-04-23 RX ADMIN — CARVEDILOL 12.5 MG: 12.5 TABLET, FILM COATED ORAL at 09:04

## 2017-04-23 RX ADMIN — RANOLAZINE 500 MG: 500 TABLET, FILM COATED, EXTENDED RELEASE ORAL at 08:04

## 2017-04-23 RX ADMIN — ISOSORBIDE MONONITRATE 30 MG: 30 TABLET, EXTENDED RELEASE ORAL at 08:04

## 2017-04-23 RX ADMIN — SERTRALINE HYDROCHLORIDE 50 MG: 50 TABLET ORAL at 08:04

## 2017-04-23 RX ADMIN — SEVELAMER CARBONATE 800 MG: 800 TABLET, FILM COATED ORAL at 08:04

## 2017-04-23 RX ADMIN — SEVELAMER CARBONATE 800 MG: 800 TABLET, FILM COATED ORAL at 04:04

## 2017-04-23 RX ADMIN — STANDARDIZED SENNA CONCENTRATE AND DOCUSATE SODIUM 1 TABLET: 8.6; 5 TABLET, FILM COATED ORAL at 09:04

## 2017-04-23 RX ADMIN — SEVELAMER CARBONATE 800 MG: 800 TABLET, FILM COATED ORAL at 01:04

## 2017-04-23 RX ADMIN — AMIODARONE HYDROCHLORIDE 200 MG: 200 TABLET ORAL at 09:04

## 2017-04-23 NOTE — PLAN OF CARE
Problem: Patient Care Overview  Goal: Plan of Care Review  Outcome: Ongoing (interventions implemented as appropriate)  POC discussed w/patient and family, verbalized understanding. Family at bedside. NSR on monitor. VSS. Voids per urinal. Patient turns with assist in bed. Orthostatic BP negative. No c/o of pain throughout shift. Pt occasionally coughing up yellowish/green sputum. Will monitor.  Fall precautions in place, bed alarm on.

## 2017-04-23 NOTE — PLAN OF CARE
Problem: Patient Care Overview  Goal: Plan of Care Review  Outcome: Ongoing (interventions implemented as appropriate)  Pt required assistance of 2 people for transfers and ambulation ~20 feet. Less confused today.

## 2017-04-23 NOTE — PROGRESS NOTES
"[facility] Hospital Medicine  Progress Note    Patient Name: Quinn Hutchins  MRN: [id number]  Patient Class: IP- Inpatient   Admission Date: 4/20/2017  Length of Stay: 3 days  Attending Physician: Aristeo De Jesus MD  Primary Care Provider: Jameel Almaguer MD        Subjective:     Principal Problem:Severe sepsis    HPI:  Quinn Hutchins is a pleasant 71-year-old CM who presented to the ED today after "passing out in my car".  Patient states he was riding home with his wife when he asked for something to drink "then passed out".  Denies H/A, lightheadedness/dizziness, visual changes.  He has a history of end-stage renal disease on hemodialysis, Monday Wednesday Friday, [facility] hemodialysis unit under care of Dr. Kaylie Reilly.   On workup he has significant anemia with a hemoglobin of 7.6 noted. His hemoglobin was about 12 g a month ago. According to the patient's wife patient had a surgery on his left arm AV fistula, most likely clotted. He has an IJ Vas-Cath. Patient is oriented to person, place, and time,d but is very sleepy upon exam. He also has been having some gastrointestinal symptoms in the last few days. C/O diarrhea x 3 days, which occurred 2 days ago.  Currently he denies N/V/D.  He is due for dialysis tomorrow, blood cultures were ordered in the emergency room, he is also receiving 1 unit of packed RBC. Mild elevation of troponin I noted. He will admitted to [facility] for medical management. Dr. Church has been consulted and will follow. Cardiology consulted given extensive history with elevated troponin.      Hospital Course:  4-21- Underwent HD today and received 2 units of PRBC, with increase in Hgb to 10.1.  Peripheral BC + for gram positive cocci and clusters resembling staph.  Source may be Left IJ Vas Cath vs Left Heel wound.  Will check 1 set of BC from Vas Cath today.  Check Xray of Left foot to r/o as infectious source.  Will start on IV Vanc- pharmacy to dose. ECHO today does not show " evidence of Endocarditis. Tmax 99.2  4/22- Patient seen and examined today. H/H stable at 10/29 after transfusion. Blood cx drawn from vas cath yesterday show no growth to date. Continue current management. Consult ID.   4/23- Pt seen and examined. Plan for HD tomorrow per Nephrology. Sensitivities for BC back, sensitive to Vanc. Appreciate ID recommendations.       Interval History: Pt seen and examined. Plan for HD tomorrow per Nephrology. Sensitivities for BC back, sensitive to Vanc. Appreciate ID recommendations.     Review of Systems   Constitutional: Positive for fatigue. Negative for chills, diaphoresis and fever.   HENT: Negative for hearing loss, mouth sores, sore throat, tinnitus and trouble swallowing.    Eyes: Negative for pain, discharge and redness.   Respiratory: Negative for apnea, cough, choking, chest tightness, shortness of breath, wheezing and stridor.    Cardiovascular: Negative for chest pain, palpitations and leg swelling.   Gastrointestinal: Negative for abdominal distention, abdominal pain, blood in stool, constipation, diarrhea, nausea, rectal pain and vomiting.   Endocrine: Negative for cold intolerance, heat intolerance, polydipsia, polyphagia and polyuria.   Genitourinary: Negative for difficulty urinating, dysuria, flank pain, frequency, hematuria and urgency.        ESRD on HD MWF   Musculoskeletal: Negative for arthralgias, back pain, gait problem, joint swelling, neck pain and neck stiffness.   Skin: Positive for wound. Negative for color change and rash.        Dry skin with Diffuse scabbing noted to entire body. Incision to LUE KATHERINE with sutures/staples CDI + erythema   Allergic/Immunologic: Negative for food allergies.   Neurological: Positive for weakness. Negative for dizziness, tremors, seizures, syncope, speech difficulty, light-headedness and headaches.   Hematological: Negative for adenopathy. Does not bruise/bleed easily.   Psychiatric/Behavioral: Positive for confusion.  Negative for agitation. The patient is not nervous/anxious.    All other systems reviewed and are negative.    Objective:     Vital Signs (Most Recent):  Temp: 98.3 °F (36.8 °C) (04/23/17 1600)  Pulse: 67 (04/23/17 1600)  Resp: 16 (04/23/17 1600)  BP: (!) 108/57 (04/23/17 1600)  SpO2: 98 % (04/23/17 1600) Vital Signs (24h Range):  Temp:  [97.8 °F (36.6 °C)-99.8 °F (37.7 °C)] 98.3 °F (36.8 °C)  Pulse:  [62-73] 67  Resp:  [16-20] 16  SpO2:  [94 %-100 %] 98 %  BP: ()/(45-76) 108/57     Weight: 76.8 kg (169 lb 5 oz)  Body mass index is 24.29 kg/(m^2).    Intake/Output Summary (Last 24 hours) at 04/23/17 1723  Last data filed at 04/23/17 1200   Gross per 24 hour   Intake              530 ml   Output                0 ml   Net              530 ml      Physical Exam   Constitutional: He is oriented to person, place, and time. He appears well-developed and well-nourished. No distress.   HENT:   Head: Normocephalic and atraumatic.   Mouth/Throat: Oropharynx is clear and moist.   Eyes: Conjunctivae and EOM are normal. Pupils are equal, round, and reactive to light. Left eye exhibits no discharge.   Neck: Normal range of motion. Neck supple. No JVD present.   Left IJ Vas Cath site WNL with DSG CDI   Cardiovascular: Normal rate, regular rhythm and intact distal pulses.  Exam reveals no gallop and no friction rub.    Murmur heard.  Pulmonary/Chest: Effort normal and breath sounds normal. No stridor. No respiratory distress. He has no wheezes. He has no rales. He exhibits no tenderness.   Comfortable on 2L NC   Abdominal: Soft. Bowel sounds are normal. He exhibits no distension and no mass. There is no tenderness. There is no rebound and no guarding.   Musculoskeletal: Normal range of motion. He exhibits no edema or tenderness.   Neurological: He is alert and oriented to person, place, and time. No cranial nerve deficit.   Falls asleep quickly.  THOMAS. Follows all commands appropriately. Some confusion noted.  Poor historian.    Skin: Skin is warm and dry. No rash noted. He is not diaphoretic. No erythema.        Dry skin with diffuse scabbing noted to entire body.  Incision to LUE shunt KATHERINE, sutures/staples intact.  + erythema/ecchymosis, no active drainage.  Left hip incision healed without s/s of infection. Left heel nickel size wound with scab intact, + ecchymosis, surrounding skin erythemic, no foul odor or active drainage.   Psychiatric: He has a normal mood and affect. His behavior is normal. Judgment and thought content normal.   Nursing note and vitals reviewed.      Significant Labs: All pertinent labs within the past 24 hours have been reviewed.    Significant Imaging:   Imaging Results         X-Ray Foot 2 View Left (Final result) Result time:  04/21/17 21:59:23    Final result by Taisha Vergara MD (04/21/17 21:59:23)    Impression:      No plain film evidence of osteomyelitis.      Electronically signed by: TAISHA VERGARA MD  Date:     04/21/17  Time:    21:59     Narrative:    EXAM: XR FOOT 2 VIEW LEFT    CLINICAL HISTORY:  Left Heel wound, + Blood cultures, rule out as infectious source    FINDINGS:  Negative for fracture or dislocation.  No significant arthritic changes.  Osteopenia.    Bandage material present plantar aspect of the foot with small amount of soft tissue edema.            US Carotid Bilateral (Final result) Result time:  04/20/17 19:28:59    Final result by Yogesh Vergara MD (04/20/17 19:28:59)    Impression:         1.  Calcific atherosclerotic plaque in the proximal right internal carotid artery causing a 20-30% stenosis by flow velocity measurements.  2.  Calcific atherosclerotic plaque in the proximal left internal carotid artery causing a 20% or less stenosis by velocity measurements    Stenosis of  % - validated velocity measurements with angiographic measurements, velocity criteria are extrapolated from diameter data as defined by the Society of Radiologists in Ultrasound Consensus Conference Radiology  2003; 229;340-346.      Electronically signed by: VASILE VERGARA MD  Date:     04/20/17  Time:    19:28     Narrative:    Exam: Carotid ultrasound    Clinical History:    Syncope     Findings:     Sonographic evaluation of the carotid systems was performed.     There is calcific plaque in the bulb of the right internal carotid artery narrowing the lumen of the vessel.  There is calcific atherosclerotic plaque in the bulb of the left internal carotid artery narrowing the lumen of the vessel.    The peak systolic velocity in the right internal carotid artery was approximately 134 cm/sec.  The right ICA to CCA peak systolic velocity ratio is 3.0.    The peak systolic velocity in the left internal carotid artery was approximately 94 cm/sec.  The left ICA to CCA peak site laceration is 1.3.    Antegrade flow noted in both vertebral arteries.            CT Head Without Contrast (Final result) Result time:  04/20/17 15:28:32    Final result by Naseem Gee MD (04/20/17 15:28:32)    Impression:      Mild generalized atrophy with white matter degeneration.  Intracranial vascular calcification.  No acute findings.        All CT scans at this facility use dose modulation, iterative reconstruction and/or weight based dosing when appropriate to reduce radiation dose to as low as reasonably achievable.       Electronically signed by: NASEEM GEE MD  Date:     04/20/17  Time:    15:28     Narrative:    CT HEAD WITHOUT CONTRAST     History:  Syncope.  Dizziness.  TIA.    Technique:  Noncontrast CT of the brain. Comparison with 03/12/2017.    Findings:  The ventricles are dilated consistent with generalized atrophy. Associated age-related white matter degeneration is present.     No significant acute findings are noted.            X-Ray Chest AP Portable (Final result) Result time:  04/20/17 15:04:54    Final result by Sami Vergara MD (04/20/17 15:04:54)    Impression:      No significant change.  Findings most  consistent with mild CHF.      Electronically signed by: TAISHA SANDOVAL MD  Date:     04/20/17  Time:    15:04     Narrative:    EXAM: XR CHEST AP PORTABLE    CLINICAL HISTORY: syncope.    COMPARISON STUDIES: March 12, 2017    FINDINGS:  Stable cardiomegaly.  Left chest pacer vs. AICD from inferior approach.  Dual-lumen left IJ unchanged.    Minimal vascular congestion without overt edema.  No pleural effusions evident.                Assessment/Plan:      *  sepsis  - Peripheral BC positive for gram + cocci and clusters, sensitive to vanc  - Will check BC from Left IJ Vas Cath Today  - WBC elevated to 14K from 12K  - Source may be Left IJ Vas Cath versus Left heel wound  - Check Left Foot Xray to r/o infectious source  - Check lactic acid today  - Continue Vancomycin- pharmacy to dose  - Currently hemodynamically stable  - Consult infectious disease      Hypertension, renal  - BP suboptimal  - Continue Coreg  -  Monitor      ESRD (end stage renal disease)  - on HD MWF at [facility]- Follows with Dr. Reilly  - Dr. 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  Shunt currently not functioning  - Has Left IJ Vas Cath  - Plan for HD in AM     Chronic systolic congestive heart failure  - ECHO shows EF of 25%  - CXR reviewed  - BNP >4900 (h/o ESRD on HD MWF)  - Continue Coreg   - Plan for HD MWF  - Cardiology following    Syncope  -  No additional episodes since admission  -  Cardiology Consulted- Feels patient had Vtach/Fib related syncope with AICD firing  -  ECHO shows EF of 20-25%  -  Carotid US shows Calcific atherosclerotic plaque in the proximal right internal carotid artery causing a 20-30% stenosis, Left has 20% stenosis  -  Orthostatics q shift  -  Serial troponins stable at 0.627 (elevation likely secondary to ICD firing)  -  CT head shows no significant findings  - Monitor      Paroxysmal atrial flutter  - Currently rate controlled and in NSR  - Continue Amio  - Monitor  - Not on long term  OAC      Elevated troponin I level  - Troponin elevated to 0.657, trending down  - 12 lead EKG reviewed  - D/W Dr. De, likely secondary to Vtach/Fib AICD firing  - Serial Troponins stable   - Continue home ASA/Plavix  - Hold off on resuming Lipitor secondary to elevated LFTs  - Lipid Panel Reviewed  - Family does not want NM stress test today, wants to schedule outpatient.     Anemia  - Secondary to ESRD  - Could have contributed to syncopal episode  - Rceived 1 Unit PRBCs Thursday and 2 Units with HD  - Hgb stable at 10.1  - Continue Epo  - CBC in AM        Elevated LFTs  - Trending down  - Hold home Statin for now        Leukocytosis  - Patient has vas cath, blood cx drawn from vas michael yesterday, NGTD  - BC + MRSA bacteremia  - Repeat CBC in AM  - CXR does not reveal infectious process  - Temp of 100.1 noted    VTE Risk Mitigation         Ordered     Place sequential compression device  Until discontinued      04/20/17 1832     Medium Risk of VTE  Once      04/20/17 1828          Desmond Wilkes NP  Department of Hospital Medicine   [facility]

## 2017-04-23 NOTE — PROGRESS NOTES
Cardiology Progress Note        SUBJECTIVE:     History of Present Illness:  Patient is a 71 y.o. male presents with VF/VT s/p ICD firing. CHF, acute sever anemia.  Improved breathing and fatigue after transfusion.      OBJECTIVE:     Vital Signs (Most Recent)  Temp: 98.6 °F (37 °C) (04/23/17 0726)  Pulse: 69 (04/23/17 0726)  Resp: 20 (04/23/17 0726)  BP: (!) 143/76 (04/23/17 0726)  SpO2: 99 % (04/23/17 1009)    Vital Signs Range (Last 24H):  Temp:  [97.5 °F (36.4 °C)-100.2 °F (37.9 °C)]   Pulse:  [65-73]   Resp:  [18-20]   BP: (103-143)/(56-76)   SpO2:  [94 %-100 %]     Intake/Output last 3 shifts:  I/O last 3 completed shifts:  In: 1140 [P.O.:890; IV Piggyback:250]  Out: 0     Intake/Output this shift:       Review of patient's allergies indicates:   Allergen Reactions    Codeine Other (See Comments)     Hallucination    Hydrocodone Other (See Comments)     Impairs vision; AMS    Percocet [oxycodone-acetaminophen] Other (See Comments)     Makes him go crazy      Doxycycline Rash and Other (See Comments)     Vision impairment  Other reaction(s): Unknown    Levaquin [levofloxacin] Rash     Blisters    Xanax [alprazolam] Palpitations     pychosis        Current Facility-Administered Medications   Medication    0.9%  NaCl infusion (for blood administration)    0.9%  NaCl infusion (for blood administration)    acetaminophen tablet 650 mg    albumin human 25% bottle 12.5 g    amiodarone tablet 200 mg    aspirin chewable tablet 81 mg    carvedilol tablet 12.5 mg    clopidogrel tablet 75 mg    dextrose 50% injection 12.5 g    dextrose 50% injection 25 g    epoetin yadi injection 10,000 Units    glucagon (human recombinant) injection 1 mg    glucose chewable tablet 16 g    glucose chewable tablet 24 g    heparin (porcine) injection 2,000 Units    isosorbide mononitrate 24 hr tablet 30 mg    ondansetron injection 4 mg    pantoprazole EC tablet 40 mg    promethazine (PHENERGAN) 6.25 mg in dextrose 5  % 50 mL IVPB    ranolazine 12 hr tablet 500 mg    senna-docusate 8.6-50 mg per tablet 1 tablet    sertraline tablet 50 mg    sevelamer carbonate tablet 800 mg    [START ON 4/24/2017] vancomycin 1 g in dextrose 5 % 250 mL IVPB (ready to mix system)     No current facility-administered medications on file prior to encounter.      Current Outpatient Prescriptions on File Prior to Encounter   Medication Sig    acetaminophen (TYLENOL) 500 MG tablet Take 500 mg by mouth every 6 (six) hours as needed for Pain.    acitretin (SORIATANE) 10 MG capsule Take 10 mg by mouth every evening.     amiodarone (PACERONE) 200 MG Tab Take 200 mg by mouth 2 (two) times daily.    aspirin (ECOTRIN) 81 MG EC tablet Take 81 mg by mouth once daily.    atorvastatin (LIPITOR) 40 MG tablet TAKE 1 TABLET BY MOUTH EVERY EVENING    carvedilol (COREG) 12.5 MG tablet Take 1 tablet by mouth 2 (two) times daily.    cetirizine (ZYRTEC) 10 MG tablet Take 10 mg by mouth once daily.    cloNIDine (CATAPRES) 0.3 MG tablet Take 0.3 mg by mouth as needed.    clopidogrel (PLAVIX) 75 mg tablet Take 75 mg by mouth once daily.    docusate sodium (COLACE) 100 MG capsule Take 1 capsule (100 mg total) by mouth 3 (three) times daily as needed for Constipation.    fluticasone (FLONASE) 50 mcg/actuation nasal spray 2 sprays by Each Nare route once daily.    isosorbide mononitrate (IMDUR) 30 MG 24 hr tablet Take 1 tablet (30 mg total) by mouth once daily.    mupirocin (BACTROBAN) 2 % ointment Apply to wound infection 2 times a week for 7 days (Patient taking differently: Apply topically as needed. Apply to wound infection 2 times a week for 7 days)    nitroGLYCERIN (NITROSTAT) 0.4 MG SL tablet Place 1 tablet (0.4 mg total) under the tongue every 5 (five) minutes as needed for Chest pain.    ondansetron (ZOFRAN-ODT) 4 MG TbDL Take 2 tablets (8 mg total) by mouth every 8 (eight) hours as needed.    pantoprazole (PROTONIX) 40 MG tablet TAKE 1 TABLET (40  MG TOTAL) BY MOUTH ONCE DAILY.    predniSONE (DELTASONE) 5 MG tablet TAKE 1 TABLET(S) ORAL (BY MOUTH) EVERY DAY    pyridoxine (VITAMIN B-6) 200 mg Tab Take 1 tablet by mouth every evening.     quetiapine (SEROQUEL) 25 MG Tab Take 1 tablet (25 mg total) by mouth every evening. Prn sleep    RANEXA 500 mg Tb12 TAKE 1 TABLET TWICE A DAY    sertraline (ZOLOFT) 50 MG tablet Take 50 mg by mouth once daily.    sevelamer carbonate (RENVELA) 800 mg Tab Take 1 tablet (800 mg total) by mouth 3 (three) times daily with meals.    thiamine 100 MG tablet Take 1 tablet (100 mg total) by mouth once daily.    tramadol (ULTRAM) 50 mg tablet Take 1 tablet (50 mg total) by mouth every 6 (six) hours as needed for Pain.       Physical Exam:  Constitutional: He is oriented to person, place, and time. He appears well-nourished.   HENT:   Head: Normocephalic.   Eyes: Pupils are equal, round, and reactive to light.   Neck: Normal carotid pulses and no JVD present. Carotid bruit is not present. No thyromegaly present.   Cardiovascular: Normal rate, regular rhythm, normal heart sounds and normal pulses. No extrasystoles are present. PMI is not displaced. Exam reveals no gallop and no S3.   No murmur heard.  Pulmonary/Chest: Breath sounds normal. No stridor. No respiratory distress.   Crackles on bases   Abdominal: Soft. Bowel sounds are normal. There is no tenderness. There is no rebound.   Musculoskeletal: Normal range of motion.   Neurological: He is alert and oriented to person, place, and time.   Skin: Skin is intact. No rash noted.   Left arm s/p staples of fistula   Psychiatric: His behavior is normal  Laboratory:  Chemistry:   Lab Results   Component Value Date     04/21/2017    K 3.4 (L) 04/21/2017    CL 96 04/21/2017    CO2 28 04/21/2017    BUN 43 (H) 04/21/2017    CREATININE 5.9 (H) 04/21/2017    CALCIUM 9.3 04/21/2017     Cardiac Markers:   Lab Results   Component Value Date    CKMB 0.9 10/08/2012    TROPONINI 0.627 (H)  04/21/2017    TROPONINI 0.04 10/08/2012     Cardiac Markers (Last 3):   Lab Results   Component Value Date    CKMB 0.9 10/08/2012    CKMB 2.4 08/03/2011    CKMB 3.0 08/01/2011    TROPONINI 0.627 (H) 04/21/2017    TROPONINI 0.627 (H) 04/21/2017    TROPONINI 0.657 (H) 04/20/2017    TROPONINI 0.04 10/08/2012    TROPONINI 0.21 (H) 08/03/2011    TROPONINI 0.92 (H) 08/01/2011     CBC:   Lab Results   Component Value Date    WBC 14.95 (H) 04/22/2017    HGB 10.1 (L) 04/22/2017    HCT 29.4 (L) 04/22/2017    HCT 35 (L) 12/27/2016    MCV 91 04/22/2017     04/22/2017     Lipids:   Lab Results   Component Value Date    CHOL 76 (L) 04/21/2017    TRIG 103 04/21/2017    HDL 11 (L) 04/21/2017     Coagulation:   Lab Results   Component Value Date    INR 1.0 03/12/2017    APTT 26.8 03/12/2017       Diagnostic Results:  ECG: Reviewed  X-Ray: Reviewed  US: Reviewed  CT: Reviewed  Echo: Reviewed      ASSESSMENT/PLAN:     Patient Active Problem List   Diagnosis    Organ transplant candidate    Hypertension, renal    Rheumatoid arthritis involving both hands with negative rheumatoid factor    Coronary artery disease    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    Secondary hyperparathyroidism of renal origin    Refractive error - Both Eyes    Chronic ischemic heart disease    Mixed hyperlipidemia    Ischemic cardiomyopathy    Abnormal ECG    Skin cancer of scalp    ESRD (end stage renal disease)    Knee pain    Osteoporosis    Essential hypertension    A-V fistula    Problem with dialysis shunt    JEROME (obstructive sleep apnea)    Chronic systolic congestive heart failure    GERD (gastroesophageal reflux disease)    Syncope    LVH (left ventricular hypertrophy)    Pulmonary HTN    Paroxysmal atrial flutter    Cervical spinal stenosis    Atrial flutter with rapid ventricular response    Back pain    Complication of AV dialysis fistula    Current chronic use of systemic steroids     SVT (supraventricular tachycardia)    NSVT (nonsustained ventricular tachycardia)    Hematoma of arm    Renal failure    Osteoarthritis of right knee    S/P implantation of automatic cardioverter/defibrillator (AICD)    Elevated troponin I level    Nausea & vomiting    Closed left hip fracture    Fall (on) (from) other stairs and steps, initial encounter    Anemia    Elevated LFTs    Leukocytosis     sepsis      Per presentation, he had VT/VFIB related syncope and subsequent ICD firing.  Acute anemia  Elevated troponin, post ICD firing  Elevated BNP acute anemia related  CAD  CHF rEF 20% decompensated  S/p S-ICD  ESRD on HD  hemarrhoid  H/o excessive bleeding when on anticoagulation Rx  AFL s/p CTI ablation in     Plan:  Continue coreg, amiodarone 200 mg bid, ASA and Plavix.  resume his Imdur and Ranexa  HD per nephro  DASH and fluid restriction  Pt's family does not want to stress test now.

## 2017-04-23 NOTE — PROGRESS NOTES
Nephrology Progress Note    Admit Date: 4/20/2017   LOS: 3 days     SUBJECTIVE:     Follow-up For:  Pt seen and chart reviewed , feels better, denies complaints ,     Scheduled Meds:   amiodarone  200 mg Oral BID    aspirin  81 mg Oral Daily    carvedilol  12.5 mg Oral BID    clopidogrel  75 mg Oral Daily    epoetin yadi (PROCRIT) injection  10,000 Units Intravenous Every Mon, Wed, Fri    heparin (porcine)  2,000 Units Intravenous Once    isosorbide mononitrate  30 mg Oral Daily    pantoprazole  40 mg Oral Daily    ranolazine  500 mg Oral BID    senna-docusate 8.6-50 mg  1 tablet Oral BID    sertraline  50 mg Oral Daily    sevelamer carbonate  800 mg Oral TID WM    [START ON 4/24/2017] vancomycin 1 g in dextrose 5 % 250 mL IVPB (ready to mix system)  1 g Intravenous Q48H     Continuous Infusions:   PRN Meds:sodium chloride, sodium chloride, acetaminophen, albumin human 25%, dextrose 50%, dextrose 50%, glucagon (human recombinant), glucose, glucose, ondansetron, promethazine (PHENERGAN) IVPB    Review of patient's allergies indicates:   Allergen Reactions    Codeine Other (See Comments)     Hallucination    Hydrocodone Other (See Comments)     Impairs vision; AMS    Percocet [oxycodone-acetaminophen] Other (See Comments)     Makes him go crazy      Doxycycline Rash and Other (See Comments)     Vision impairment  Other reaction(s): Unknown    Levaquin [levofloxacin] Rash     Blisters    Xanax [alprazolam] Palpitations     pychosis        Review of Systems :    Constitutional: positive for fatigue and malaise  Eyes: negative  Ears, nose, mouth, throat, and face: negative  Respiratory: positive for dyspnea on exertion  Cardiovascular: negative  Gastrointestinal: negative  Hematologic/lymphatic: negative  Musculoskeletal:negative  Neurological: negative    OBJECTIVE:     Vital Signs (Most Recent)    Temp: 98.6 °F (37 °C) (04/23/17 0726)  Pulse: 62 (04/23/17 1333)  Resp: 20 (04/23/17 0726)  BP: (!)  99/52 (04/23/17 1045)  SpO2: 99 % (04/23/17 1009)    Vital Signs Range (Last 24H):    Temp:  [97.8 °F (36.6 °C)-100.2 °F (37.9 °C)]   Pulse:  [62-73]   Resp:  [18-20]   BP: ()/(45-76)   SpO2:  [94 %-100 %]     I & O (Last 24H):    Intake/Output Summary (Last 24 hours) at 04/23/17 1434  Last data filed at 04/23/17 0600   Gross per 24 hour   Intake              170 ml   Output                0 ml   Net              170 ml     Physical Exam:    Gen: WDWN male in no apparent distress  Skin: No rashes or ulcers , multiple healed scabs on both hands   Eyes: Normal conjunctiva and lids,   ENT: Normal hearing with no oropharyngeal lesions  Neck: No JVD, IJ vascath   Chest: Clear with no rales, rhonchi, wheezing with normal effort  CV: Regular with no murmurs, gallops or rubs  Abd: Soft, nontender, no distension, positive bowel sounds  Ext: No cyanosis, clubbing or edema  Neuro : non-focal     Laboratory:    CBC:     Recent Labs  Lab 04/22/17  0531   WBC 14.95*   RBC 3.22*   HGB 10.1*   HCT 29.4*      MCV 91   MCH 31.4*   MCHC 34.4     BMP:     Recent Labs  Lab 04/23/17  1216   GLU 87   *   K 4.0   CL 96   CO2 24   BUN 54*   CREATININE 6.1*   CALCIUM 10.4     CMP:     Recent Labs  Lab 04/21/17  0609 04/23/17  1216   GLU 62* 87   CALCIUM 9.3 10.4   ALBUMIN 2.1*  2.1*  --    PROT 5.8*  --     133*   K 3.4* 4.0   CO2 28 24   CL 96 96   BUN 43* 54*   CREATININE 5.9* 6.1*   ALKPHOS 115  --    ALT 49*  --    *  --    BILITOT 0.8  --      LFTs:     Recent Labs  Lab 04/21/17  0609   ALT 49*   *   ALKPHOS 115   BILITOT 0.8   PROT 5.8*   ALBUMIN 2.1*  2.1*     Lab Results   Component Value Date    .0 (H) 10/26/2016    CALCIUM 10.4 04/23/2017    PHOS 5.4 (H) 04/21/2017       Lab Results   Component Value Date    ALBUMIN 2.1 (L) 04/21/2017    ALBUMIN 2.1 (L) 04/21/2017         Diagnostic Results: reviewed     ASSESSMENT/PLAN:          IMPRESSION AND RECOMMENDATIONS: 81-year-old   gentleman seen in f/u for following medical problems     1. End-stage renal disease on hemodialysis - ( MWF [facility] [place] [facility] ) , plan HD in AM , has a right IJ vascath in place,      2. Anemia - multifactorial, he had a surgical procedure about 2 weeks ago. Hemoglobin 10 gms after 3 units pRBC tx ,      3. AMS : MRSA bacteremia  ,  consult ID , on Vanc  ,      4. Secondary hyperparathyroidism - renal diet, renvela ,      5. Coronary artery disease - troponin leak noted, Cards recommended a cadiac stress test , but pt and family declined ,      6. Hypertension - BP controlled,    7. SHPT : renal diet,      Will continue to monitor. Total time spent 40 minutes including time needed to review the records,  patient evaluation, documentation, face-to-face discussion with the patient, his brother, Primary team, Cards ,   more than 50% of the time was spent on coordination of care and counseling.      Fermin Church MD

## 2017-04-23 NOTE — PT/OT/SLP PROGRESS
"Physical Therapy  Treatment    Quinn Hutchins   MRN: [id number]   Admitting Diagnosis: Severe sepsis    PT Received On: 04/23/17  PT Start Time: 0845     PT Stop Time: 0910    PT Total Time (min): 25 min       Billable Minutes:  Gait [id number] and Therapeutic Exercise 10    Treatment Type: Treatment  PT/PTA: PTA     PTA Visit Number: 1       General Precautions: Standard, fall  Orthopedic Precautions:     Braces:           Subjective:  Communicated with Nurse: Sinai prior to session. Pt states "I Dont think I can walk" Son in law also present        Pain Rating:  (L hip soreness)                   Objective:   Patient found with: telemetry, peripheral IV, oxygen    Functional Mobility:  Bed Mobility:   Supine to Sit: Moderate Assistance  Sit to Supine:  (not tested, Pt sat in the chair after tx. SON IN LAW IN ROOM)    Transfers:  Sit <> Stand Assistance: Moderate Assistance, Other (see comments) (of 2 people)  Sit <> Stand Assistive Device: Rolling Walker  Bed <> Chair Assistance: Moderate Assistance (x 2 people)    Gait:   Gait Distance: Pt ambulated ~20 feet. Min a of 2 people for the first 10 feet then got weaker and needed MOD A x 2 to safety make it ot the chair. Step to gait. Poor use of arms to off load L leg .   Gait Assistive Device: Rolling walker  Gait Pattern:  (step to gait)  Gait Deviation(s): decreased kiko, decreased toe-to-floor clearance (kyphotic posture)    Stairs:      Balance:   Static Sit:   Dynamic Sit:   Static Stand:   Dynamic stand:      Therapeutic Activities and Exercises:  In supine prior to oob: L heel slides x 15 reps and Hip ab/addcution x 15 reps. Assisted pt to hold leg off the bed to avoid sheering of L heel. Once in the chair: worked on TKE x 10 reps.      AM-PAC 6 CLICK MOBILITY  How much help from another person does this patient currently need?   1 = Unable, Total/Dependent Assistance  2 = A lot, Maximum/Moderate Assistance  3 = A little, Minimum/Contact Guard/Supervision  4 " = None, Modified [place]/Independent         AM-PAC Raw Score CMS G-Code Modifier Level of Impairment Assistance   6 % Total / Unable   7 - 9 CM 80 - 100% Maximal Assist   10 - 14 CL 60 - 80% Moderate Assist   15 - 19 CK 40 - 60% Moderate Assist   20 - 22 CJ 20 - 40% Minimal Assist   23 CI 1-20% SBA / CGA   24 CH 0% Independent/ Mod I     Patient left up in chair with son in law and present and CHree in room.     Assessment:  Pt was less confused today and had less difficulty with ambulation but continues to required significant assistance. Progressing    Rehab identified problem list/impairments: Rehab identified problem list/impairments: weakness, impaired endurance, impaired balance, gait instability, decreased ROM, impaired joint extensibility, impaired muscle length, impaired functional mobilty, impaired self care skills    Rehab potential is good.    Activity tolerance: Good    Discharge recommendations: Discharge Facility/Level Of Care Needs: nursing facility, skilled     Barriers to discharge: Barriers to Discharge: Decreased caregiver support    Equipment recommendations: Equipment Needed After Discharge: none     GOALS:   Physical Therapy Goals        Problem: Physical Therapy Goal    Goal Priority Disciplines Outcome Goal Variances Interventions   Physical Therapy Goal     PT/OT, PT Ongoing (interventions implemented as appropriate)               PLAN:    Patient to be seen  (pt will be seen a minimum of 5-7 times a week as tolerated. )  to address the above listed problems via gait training, therapeutic activities, therapeutic exercises  Plan of Care expires: 04/29/17  Plan of Care reviewed with: (P) patient         Zneia Hoover, PTA  04/23/2017

## 2017-04-23 NOTE — PLAN OF CARE
Problem: Patient Care Overview  Goal: Plan of Care Review  Outcome: Ongoing (interventions implemented as appropriate)  Pt remained afebrile throughout this shift.    Pt remained free of falls this shift.   Pt worked with physical therapy then sat up in chair most of this shift.   Pt turned every two hrs once back in bed.   Plan of care reviewed. Patient and son verbalizes understanding.   Pt moving/turing independently.   Patient SR on monitor.   Bed low, side rails up x 2, wheels locked, call light in reach.   Patient instructed to call for assistance.   Will continue to monitor.

## 2017-04-23 NOTE — SUBJECTIVE & OBJECTIVE
Interval History: Pt seen and examined. Plan for HD tomorrow per Nephrology. Sensitivities for BC back, sensitive to Vanc. Appreciate ID recommendations.     Review of Systems   Constitutional: Positive for fatigue. Negative for chills, diaphoresis and fever.   HENT: Negative for hearing loss, mouth sores, sore throat, tinnitus and trouble swallowing.    Eyes: Negative for pain, discharge and redness.   Respiratory: Negative for apnea, cough, choking, chest tightness, shortness of breath, wheezing and stridor.    Cardiovascular: Negative for chest pain, palpitations and leg swelling.   Gastrointestinal: Negative for abdominal distention, abdominal pain, blood in stool, constipation, diarrhea, nausea, rectal pain and vomiting.   Endocrine: Negative for cold intolerance, heat intolerance, polydipsia, polyphagia and polyuria.   Genitourinary: Negative for difficulty urinating, dysuria, flank pain, frequency, hematuria and urgency.        ESRD on HD MWF   Musculoskeletal: Negative for arthralgias, back pain, gait problem, joint swelling, neck pain and neck stiffness.   Skin: Positive for wound. Negative for color change and rash.        Dry skin with Diffuse scabbing noted to entire body. Incision to LUE KATHERINE with sutures/staples CDI + erythema   Allergic/Immunologic: Negative for food allergies.   Neurological: Positive for weakness. Negative for dizziness, tremors, seizures, syncope, speech difficulty, light-headedness and headaches.   Hematological: Negative for adenopathy. Does not bruise/bleed easily.   Psychiatric/Behavioral: Positive for confusion. Negative for agitation. The patient is not nervous/anxious.    All other systems reviewed and are negative.    Objective:     Vital Signs (Most Recent):  Temp: 98.3 °F (36.8 °C) (04/23/17 1600)  Pulse: 67 (04/23/17 1600)  Resp: 16 (04/23/17 1600)  BP: (!) 108/57 (04/23/17 1600)  SpO2: 98 % (04/23/17 1600) Vital Signs (24h Range):  Temp:  [97.8 °F (36.6 °C)-99.8 °F (37.7  °C)] 98.3 °F (36.8 °C)  Pulse:  [62-73] 67  Resp:  [16-20] 16  SpO2:  [94 %-100 %] 98 %  BP: ()/(45-76) 108/57     Weight: 76.8 kg (169 lb 5 oz)  Body mass index is 24.29 kg/(m^2).    Intake/Output Summary (Last 24 hours) at 04/23/17 1723  Last data filed at 04/23/17 1200   Gross per 24 hour   Intake              530 ml   Output                0 ml   Net              530 ml      Physical Exam   Constitutional: He is oriented to person, place, and time. He appears well-developed and well-nourished. No distress.   HENT:   Head: Normocephalic and atraumatic.   Mouth/Throat: Oropharynx is clear and moist.   Eyes: Conjunctivae and EOM are normal. Pupils are equal, round, and reactive to light. Left eye exhibits no discharge.   Neck: Normal range of motion. Neck supple. No JVD present.   Left IJ Vas Cath site WNL with DSG CDI   Cardiovascular: Normal rate, regular rhythm and intact distal pulses.  Exam reveals no gallop and no friction rub.    Murmur heard.  Pulmonary/Chest: Effort normal and breath sounds normal. No stridor. No respiratory distress. He has no wheezes. He has no rales. He exhibits no tenderness.   Comfortable on 2L NC   Abdominal: Soft. Bowel sounds are normal. He exhibits no distension and no mass. There is no tenderness. There is no rebound and no guarding.   Musculoskeletal: Normal range of motion. He exhibits no edema or tenderness.   Neurological: He is alert and oriented to person, place, and time. No cranial nerve deficit.   Falls asleep quickly.  MAEW. Follows all commands appropriately. Some confusion noted.  Poor historian.   Skin: Skin is warm and dry. No rash noted. He is not diaphoretic. No erythema.        Dry skin with diffuse scabbing noted to entire body.  Incision to LUE shunt KATHERINE, sutures/staples intact.  + erythema/ecchymosis, no active drainage.  Left hip incision healed without s/s of infection. Left heel nickel size wound with scab intact, + ecchymosis, surrounding skin  erythemic, no foul odor or active drainage.   Psychiatric: He has a normal mood and affect. His behavior is normal. Judgment and thought content normal.   Nursing note and vitals reviewed.      Significant Labs: All pertinent labs within the past 24 hours have been reviewed.    Significant Imaging:   Imaging Results         X-Ray Foot 2 View Left (Final result) Result time:  04/21/17 21:59:23    Final result by Taisha Vergara MD (04/21/17 21:59:23)    Impression:      No plain film evidence of osteomyelitis.      Electronically signed by: TAISHA VERGARA MD  Date:     04/21/17  Time:    21:59     Narrative:    EXAM: XR FOOT 2 VIEW LEFT    CLINICAL HISTORY:  Left Heel wound, + Blood cultures, rule out as infectious source    FINDINGS:  Negative for fracture or dislocation.  No significant arthritic changes.  Osteopenia.    Bandage material present plantar aspect of the foot with small amount of soft tissue edema.            US Carotid Bilateral (Final result) Result time:  04/20/17 19:28:59    Final result by Yogesh Vergara MD (04/20/17 19:28:59)    Impression:         1.  Calcific atherosclerotic plaque in the proximal right internal carotid artery causing a 20-30% stenosis by flow velocity measurements.  2.  Calcific atherosclerotic plaque in the proximal left internal carotid artery causing a 20% or less stenosis by velocity measurements    Stenosis of  % - validated velocity measurements with angiographic measurements, velocity criteria are extrapolated from diameter data as defined by the Society of Radiologists in Ultrasound Consensus Conference Radiology 2003; 229;340-346.      Electronically signed by: YOGESH VERGARA MD  Date:     04/20/17  Time:    19:28     Narrative:    Exam: Carotid ultrasound    Clinical History:    Syncope     Findings:     Sonographic evaluation of the carotid systems was performed.     There is calcific plaque in the bulb of the right internal carotid artery narrowing the lumen of the  vessel.  There is calcific atherosclerotic plaque in the bulb of the left internal carotid artery narrowing the lumen of the vessel.    The peak systolic velocity in the right internal carotid artery was approximately 134 cm/sec.  The right ICA to CCA peak systolic velocity ratio is 3.0.    The peak systolic velocity in the left internal carotid artery was approximately 94 cm/sec.  The left ICA to CCA peak site laceration is 1.3.    Antegrade flow noted in both vertebral arteries.            CT Head Without Contrast (Final result) Result time:  04/20/17 15:28:32    Final result by David Gee MD (04/20/17 15:28:32)    Impression:      Mild generalized atrophy with white matter degeneration.  Intracranial vascular calcification.  No acute findings.        All CT scans at this facility use dose modulation, iterative reconstruction and/or weight based dosing when appropriate to reduce radiation dose to as low as reasonably achievable.       Electronically signed by: DAVID GEE MD  Date:     04/20/17  Time:    15:28     Narrative:    CT HEAD WITHOUT CONTRAST     History:  Syncope.  Dizziness.  TIA.    Technique:  Noncontrast CT of the brain. Comparison with 03/12/2017.    Findings:  The ventricles are dilated consistent with generalized atrophy. Associated age-related white matter degeneration is present.     No significant acute findings are noted.            X-Ray Chest AP Portable (Final result) Result time:  04/20/17 15:04:54    Final result by Sami Vergara MD (04/20/17 15:04:54)    Impression:      No significant change.  Findings most consistent with mild CHF.      Electronically signed by: SAMI VERGARA MD  Date:     04/20/17  Time:    15:04     Narrative:    EXAM: XR CHEST AP PORTABLE    CLINICAL HISTORY: syncope.    COMPARISON STUDIES: March 12, 2017    FINDINGS:  Stable cardiomegaly.  Left chest pacer vs. AICD from inferior approach.  Dual-lumen left IJ unchanged.    Minimal vascular congestion  without overt edema.  No pleural effusions evident.

## 2017-04-24 PROBLEM — R78.81 BACTEREMIA DUE TO STAPHYLOCOCCUS AUREUS: Status: ACTIVE | Noted: 2017-04-24

## 2017-04-24 PROBLEM — L89.609 DECUBITUS ULCER, HEEL: Status: ACTIVE | Noted: 2017-04-24

## 2017-04-24 PROBLEM — B95.62 MRSA BACTEREMIA: Status: ACTIVE | Noted: 2017-04-24

## 2017-04-24 PROBLEM — B95.61 BACTEREMIA DUE TO STAPHYLOCOCCUS AUREUS: Status: ACTIVE | Noted: 2017-04-24

## 2017-04-24 PROBLEM — G93.41 ENCEPHALOPATHY, METABOLIC: Status: ACTIVE | Noted: 2017-04-24

## 2017-04-24 LAB
ANION GAP SERPL CALC-SCNC: 16 MMOL/L
BASOPHILS # BLD AUTO: 0.01 K/UL
BASOPHILS NFR BLD: 0.1 %
BUN SERPL-MCNC: 70 MG/DL
CALCIUM SERPL-MCNC: 10.2 MG/DL
CHLORIDE SERPL-SCNC: 96 MMOL/L
CO2 SERPL-SCNC: 21 MMOL/L
CREAT SERPL-MCNC: 7 MG/DL
DIFFERENTIAL METHOD: ABNORMAL
EOSINOPHIL # BLD AUTO: 0.2 K/UL
EOSINOPHIL NFR BLD: 1.4 %
ERYTHROCYTE [DISTWIDTH] IN BLOOD BY AUTOMATED COUNT: 16.4 %
EST. GFR  (AFRICAN AMERICAN): 8 ML/MIN/1.73 M^2
EST. GFR  (NON AFRICAN AMERICAN): 7 ML/MIN/1.73 M^2
GLUCOSE SERPL-MCNC: 64 MG/DL
HCT VFR BLD AUTO: 29.1 %
HGB BLD-MCNC: 10.1 G/DL
LYMPHOCYTES # BLD AUTO: 0.4 K/UL
LYMPHOCYTES NFR BLD: 2.9 %
MCH RBC QN AUTO: 31.1 PG
MCHC RBC AUTO-ENTMCNC: 34.7 %
MCV RBC AUTO: 90 FL
MONOCYTES # BLD AUTO: 1 K/UL
MONOCYTES NFR BLD: 7.1 %
NEUTROPHILS # BLD AUTO: 11.8 K/UL
NEUTROPHILS NFR BLD: 88.5 %
PLATELET # BLD AUTO: 191 K/UL
PMV BLD AUTO: 10.1 FL
POCT GLUCOSE: 83 MG/DL (ref 70–110)
POTASSIUM SERPL-SCNC: 4 MMOL/L
RBC # BLD AUTO: 3.25 M/UL
SODIUM SERPL-SCNC: 133 MMOL/L
VANCOMYCIN SERPL-MCNC: 15.4 UG/ML
WBC # BLD AUTO: 13.36 K/UL

## 2017-04-24 PROCEDURE — 80202 ASSAY OF VANCOMYCIN: CPT

## 2017-04-24 PROCEDURE — 87077 CULTURE AEROBIC IDENTIFY: CPT

## 2017-04-24 PROCEDURE — 25000003 PHARM REV CODE 250: Performed by: INTERNAL MEDICINE

## 2017-04-24 PROCEDURE — 25000003 PHARM REV CODE 250: Performed by: FAMILY MEDICINE

## 2017-04-24 PROCEDURE — 87186 SC STD MICRODIL/AGAR DIL: CPT

## 2017-04-24 PROCEDURE — 99233 SBSQ HOSP IP/OBS HIGH 50: CPT | Mod: ,,, | Performed by: INTERNAL MEDICINE

## 2017-04-24 PROCEDURE — 63600175 PHARM REV CODE 636 W HCPCS: Performed by: INTERNAL MEDICINE

## 2017-04-24 PROCEDURE — 97110 THERAPEUTIC EXERCISES: CPT

## 2017-04-24 PROCEDURE — 21400001 HC TELEMETRY ROOM

## 2017-04-24 PROCEDURE — 27000221 HC OXYGEN, UP TO 24 HOURS

## 2017-04-24 PROCEDURE — 36415 COLL VENOUS BLD VENIPUNCTURE: CPT

## 2017-04-24 PROCEDURE — 25000003 PHARM REV CODE 250: Performed by: NURSE PRACTITIONER

## 2017-04-24 PROCEDURE — 85025 COMPLETE CBC W/AUTO DIFF WBC: CPT

## 2017-04-24 PROCEDURE — 80100016 HC MAINTENANCE HEMODIALYSIS

## 2017-04-24 PROCEDURE — 80048 BASIC METABOLIC PNL TOTAL CA: CPT

## 2017-04-24 PROCEDURE — 87040 BLOOD CULTURE FOR BACTERIA: CPT

## 2017-04-24 PROCEDURE — 87040 BLOOD CULTURE FOR BACTERIA: CPT | Mod: 59

## 2017-04-24 PROCEDURE — 99232 SBSQ HOSP IP/OBS MODERATE 35: CPT | Mod: ,,, | Performed by: INTERNAL MEDICINE

## 2017-04-24 RX ORDER — TRAMADOL HYDROCHLORIDE 50 MG/1
50 TABLET ORAL EVERY 6 HOURS PRN
Status: DISCONTINUED | OUTPATIENT
Start: 2017-04-24 | End: 2017-04-25 | Stop reason: HOSPADM

## 2017-04-24 RX ORDER — ALBUMIN HUMAN 250 G/1000ML
12.5 SOLUTION INTRAVENOUS
Status: DISCONTINUED | OUTPATIENT
Start: 2017-04-24 | End: 2017-04-25 | Stop reason: HOSPADM

## 2017-04-24 RX ORDER — HEPARIN SODIUM 1000 [USP'U]/ML
2000 INJECTION, SOLUTION INTRAVENOUS; SUBCUTANEOUS ONCE
Status: COMPLETED | OUTPATIENT
Start: 2017-04-24 | End: 2017-04-24

## 2017-04-24 RX ADMIN — SEVELAMER CARBONATE 800 MG: 800 TABLET, FILM COATED ORAL at 05:04

## 2017-04-24 RX ADMIN — CLOPIDOGREL BISULFATE 75 MG: 75 TABLET ORAL at 08:04

## 2017-04-24 RX ADMIN — HEPARIN SODIUM 2000 UNITS: 1000 INJECTION, SOLUTION INTRAVENOUS; SUBCUTANEOUS at 09:04

## 2017-04-24 RX ADMIN — ERYTHROPOIETIN 10000 UNITS: 10000 INJECTION, SOLUTION INTRAVENOUS; SUBCUTANEOUS at 09:04

## 2017-04-24 RX ADMIN — CARVEDILOL 12.5 MG: 12.5 TABLET, FILM COATED ORAL at 08:04

## 2017-04-24 RX ADMIN — ISOSORBIDE MONONITRATE 30 MG: 30 TABLET, EXTENDED RELEASE ORAL at 08:04

## 2017-04-24 RX ADMIN — SEVELAMER CARBONATE 800 MG: 800 TABLET, FILM COATED ORAL at 08:04

## 2017-04-24 RX ADMIN — RANOLAZINE 500 MG: 500 TABLET, FILM COATED, EXTENDED RELEASE ORAL at 08:04

## 2017-04-24 RX ADMIN — PANTOPRAZOLE SODIUM 40 MG: 40 TABLET, DELAYED RELEASE ORAL at 08:04

## 2017-04-24 RX ADMIN — ASPIRIN 81 MG CHEWABLE TABLET 81 MG: 81 TABLET CHEWABLE at 08:04

## 2017-04-24 RX ADMIN — STANDARDIZED SENNA CONCENTRATE AND DOCUSATE SODIUM 1 TABLET: 8.6; 5 TABLET, FILM COATED ORAL at 08:04

## 2017-04-24 RX ADMIN — TRAMADOL HYDROCHLORIDE 50 MG: 50 TABLET, FILM COATED ORAL at 12:04

## 2017-04-24 RX ADMIN — AMIODARONE HYDROCHLORIDE 200 MG: 200 TABLET ORAL at 08:04

## 2017-04-24 RX ADMIN — VANCOMYCIN HYDROCHLORIDE 750 MG: 750 INJECTION, POWDER, LYOPHILIZED, FOR SOLUTION INTRAVENOUS at 05:04

## 2017-04-24 RX ADMIN — SERTRALINE HYDROCHLORIDE 50 MG: 50 TABLET ORAL at 08:04

## 2017-04-24 NOTE — PT/OT/SLP PROGRESS
Physical Therapy  Treatment    Quinn Hutchins   MRN: [id number]   Admitting Diagnosis: Severe sepsis    PT Received On: 17  PT Start Time: 1330     PT Stop Time: 1345    PT Total Time (min): 15 min       Billable Minutes:  Therapeutic Exercise 15    Treatment Type: Treatment  PT/PTA: PT          General Precautions: Standard, fall  Orthopedic Precautions: N/A   Braces: N/A    Subjective:  Communicated with NURSE MURO prior to session.  PT VERY CONFUSED, SLOW TO RESPOND UPON ARRIVAL, ATTEMPTED TO RE-ORIENT TO TOLERANCE  Pain Ratin/10    Objective:   Patient found with: telemetry, peripheral IV, oxygen    Functional Mobility:  Bed Mobility:     Transfers:    Gait:     Balance:   Static Sit:   Dynamic Sit:   Static Stand:   Dynamic stand:      Therapeutic Activities and Exercises:  PT EDUCATED IN AND PERFORMED SUPINE BLE THEREX 1 SET OF 10 REPS IN ALL AVAILABLE PLANES OF MOVEMENT, AAROM/PROM.  MAX CUES TO STAY ON TASK    Patient left supine with all lines intact, call button in reach, bed alarm on and NURSE notified.    Assessment:  Quinn Hutchins is a 71 y.o. male with a medical diagnosis of Severe sepsis   PT WILL BENEFIT FROM CONT. SKILLED P.T. TO ADDRESS IMPAIRMENTS IF PT ABLE TO PARTICIPATE    Rehab identified problem list/impairments: Rehab identified problem list/impairments: weakness, impaired endurance, decreased safety awareness, impaired cognition    Rehab potential is fair.    Activity tolerance: Fair    Discharge recommendations: Discharge Facility/Level Of Care Needs: nursing facility, skilled     Barriers to discharge: Barriers to Discharge: Decreased caregiver support    Equipment recommendations: Equipment Needed After Discharge: none     GOALS:   Physical Therapy Goals        Problem: Physical Therapy Goal    Goal Priority Disciplines Outcome Goal Variances Interventions   Physical Therapy Goal     PT/OT, PT Ongoing (interventions implemented as appropriate)             PLAN:    Patient to be  seen 5 x/week  to address the above listed problems via gait training, therapeutic activities, therapeutic exercises  Plan of Care expires: 04/29/17  Plan of Care reviewed with: patient    PT ENCOURAGED TO CALL FOR ASSISTANCE WITH ALL NEEDS DUE TO FALL RISK STATUS, PT AGREEABLE    Nicci Montalvo, PT  04/24/2017

## 2017-04-24 NOTE — PLAN OF CARE
Problem: Patient Care Overview  Goal: Plan of Care Review  Outcome: Ongoing (interventions implemented as appropriate)  PT JUST BACK FROM DIALYSIS, APPEARS CONFUSED, TOLERATED SUPINE BLE THEREX WITH MAX CUES

## 2017-04-24 NOTE — PROGRESS NOTES
Patient currently being transported off unit for Bone Scan.  Unable to perform follow up at this time. Will check back tomorrow.  Discussed with primary nurse N. Fleming, RN who states patient's dressing intact at present.

## 2017-04-24 NOTE — CONSULTS
"[facility]  Infectious Disease  Consult Note    Patient Name: Quinn Hutchins  MRN: [id number]  Admission Date: 4/20/2017  Hospital Length of Stay: 4 days  Attending Physician: Aristeo De Jesus MD  Primary Care Provider: Jameel Almaguer MD     Isolation Status: Contact    Patient information was obtained from patient and ER records.      Consults  Assessment/Plan:     ESRD (end stage renal disease)  HD as per nephrology     Chronic systolic congestive heart failure  Cardiology follow up     Bacteremia due to Staphylococcus aureus  Source control is needed in all cases of staph bacteremia.  Possible sources are the left IJ-which will need to be changed , endocarditis /icd infection, left heel decubitus/skin infection.  Will plan for LUZ,   repeat blood culture and will remove left IJ when blood is now sterile prior to discharge if other source of MRSA is found.  Continue vanco      Decubitus ulcer, heel  Will do bone scan to rule out osteomyelitis      Thank you for your consult. I will follow-up with patient. Please contact us if you have any additional questions.    Sami Abarca MD  Infectious Disease  [facility]    Subjective:     Principal Problem: Severe sepsis    HPI: 71-year-old man with past history of infected AV fistula who presented to the ED with history of syncope . Patient states he was riding home with his wife when he asked for something to drink "then passed out". Denies H/A, lightheadedness/dizziness, visual changes. He has a history of end-stage renal disease on hemodialysis, Monday Wednesday Friday. On workup he has significant anemia with a hemoglobin of 7.6 noted. His hemoglobin was about 12 g a month ago. He has an IJ Vas-Cath.    Since admission, blood culture is now positive for MRSA, he also has left heel decubitus ulcer and multiple skin lesions from skin cancer .  Lab data showed wbc of 14.95.Cardiac echo showed EF of 25 , no vegetation       Past Medical " "History:   Diagnosis Date    A-V fistula     RIGHT UPPER ARM    Anemia in chronic kidney disease 8/16/2012    Anticoagulant long-term use     Arthritis     Atrial flutter     Cancer     RIGHT RENAL CELL; SKIN CA    Chronic ischemic heart disease 8/20/2013    Chronic systolic congestive heart failure 4/12/2016    Coronary artery disease 8/16/2012    Depression     Dialysis patient     M-W-F    Elbow fracture     Encounter for blood transfusion     ESRD (end stage renal disease) on dialysis 8/16/2012    Fractured coccyx     Gout, unspecified 8/16/2012    Heel bone fracture     Hypertension 8/20/2013    Hypertension, renal 8/16/2012    Ischemic cardiomyopathy 8/20/2013    Kidney stones     Macular degeneration     Mixed hyperlipidemia 8/20/2013    Myocardial infarction     Neuromuscular disorder     JEROME (obstructive sleep apnea) 4/12/2016    JEROME on CPAP     Paroxysmal atrial flutter 5/12/2016    Personal history of skin cancer     Rheumatoid arthritis 8/16/2012    Secondary hyperparathyroidism of renal origin 8/16/2012       Past Surgical History:   Procedure Laterality Date    AV FISTULA PLACEMENT      REVISION X2    CARDIAC CATHETERIZATION      JOINT REPLACEMENT Right     hip    PARTIAL NEPHRECTOMY Right 2008    for renal cell cancer - "stage 1" per patient.    SKIN BIOPSY      TONSILLECTOMY         Review of patient's allergies indicates:   Allergen Reactions    Codeine Other (See Comments)     Hallucination    Hydrocodone Other (See Comments)     Impairs vision; AMS    Percocet [oxycodone-acetaminophen] Other (See Comments)     Makes him go crazy      Doxycycline Rash and Other (See Comments)     Vision impairment  Other reaction(s): Unknown    Levaquin [levofloxacin] Rash     Blisters    Xanax [alprazolam] Palpitations     pychosis        Medications:  Facility-Administered Medications Prior to Admission   Medication    hyaluronate sodium, stabilized Syrg 4 mL "     Prescriptions Prior to Admission   Medication Sig    acetaminophen (TYLENOL) 500 MG tablet Take 500 mg by mouth every 6 (six) hours as needed for Pain.    acitretin (SORIATANE) 10 MG capsule Take 10 mg by mouth every evening.     amiodarone (PACERONE) 200 MG Tab Take 200 mg by mouth 2 (two) times daily.    aspirin (ECOTRIN) 81 MG EC tablet Take 81 mg by mouth once daily.    atorvastatin (LIPITOR) 40 MG tablet TAKE 1 TABLET BY MOUTH EVERY EVENING    carvedilol (COREG) 12.5 MG tablet Take 1 tablet by mouth 2 (two) times daily.    cetirizine (ZYRTEC) 10 MG tablet Take 10 mg by mouth once daily.    cloNIDine (CATAPRES) 0.3 MG tablet Take 0.3 mg by mouth as needed.    clopidogrel (PLAVIX) 75 mg tablet Take 75 mg by mouth once daily.    docusate sodium (COLACE) 100 MG capsule Take 1 capsule (100 mg total) by mouth 3 (three) times daily as needed for Constipation.    fluticasone (FLONASE) 50 mcg/actuation nasal spray 2 sprays by Each Nare route once daily.    isosorbide mononitrate (IMDUR) 30 MG 24 hr tablet Take 1 tablet (30 mg total) by mouth once daily.    mupirocin (BACTROBAN) 2 % ointment Apply to wound infection 2 times a week for 7 days (Patient taking differently: Apply topically as needed. Apply to wound infection 2 times a week for 7 days)    nitroGLYCERIN (NITROSTAT) 0.4 MG SL tablet Place 1 tablet (0.4 mg total) under the tongue every 5 (five) minutes as needed for Chest pain.    ondansetron (ZOFRAN-ODT) 4 MG TbDL Take 2 tablets (8 mg total) by mouth every 8 (eight) hours as needed.    pantoprazole (PROTONIX) 40 MG tablet TAKE 1 TABLET (40 MG TOTAL) BY MOUTH ONCE DAILY.    predniSONE (DELTASONE) 5 MG tablet TAKE 1 TABLET(S) ORAL (BY MOUTH) EVERY DAY    pyridoxine (VITAMIN B-6) 200 mg Tab Take 1 tablet by mouth every evening.     quetiapine (SEROQUEL) 25 MG Tab Take 1 tablet (25 mg total) by mouth every evening. Prn sleep    RANEXA 500 mg Tb12 TAKE 1 TABLET TWICE A DAY    sertraline  (ZOLOFT) 50 MG tablet Take 50 mg by mouth once daily.    sevelamer carbonate (RENVELA) 800 mg Tab Take 1 tablet (800 mg total) by mouth 3 (three) times daily with meals.    thiamine 100 MG tablet Take 1 tablet (100 mg total) by mouth once daily.    tramadol (ULTRAM) 50 mg tablet Take 1 tablet (50 mg total) by mouth every 6 (six) hours as needed for Pain.     Antibiotics     Start     Stop Route Frequency Ordered    04/24/17 1700  vancomycin 1 g in dextrose 5 % 250 mL IVPB (ready to mix system)      -- IV Every 48 hours (non-standard times) 04/22/17 1255        Antifungals     None        Antivirals     None           Immunization History   Administered Date(s) Administered    Hepatitis A / Hepatitis B 08/16/2012, 02/13/2013    Hepatitis B, Adult 09/13/2012    Influenza 10/20/2006, 10/23/2007    Influenza - High Dose 10/31/2013, 10/05/2016    Influenza Whole 10/11/2014    Pneumococcal Conjugate - 13 Valent 07/23/2015    Pneumococcal Polysaccharide - 23 Valent 08/16/2012    Tdap 08/16/2012       Family History     Problem Relation (Age of Onset)    Cancer Brother    Heart disease Mother    Hyperlipidemia Brother    Hypertension Mother, Brother    Kidney disease Mother, Maternal Aunt    Parkinsonism Father        Social History     Social History    Marital status:      Spouse name: N/A    Number of children: N/A    Years of education: N/A     Occupational History          Social History Main Topics    Smoking status: Never Smoker    Smokeless tobacco: Never Used    Alcohol use No      Comment: Drank only while serving in [place]    Drug use: No    Sexual activity: Yes     Partners: Female     Other Topics Concern    None     Social History Narrative     Review of Systems   Constitutional: Positive for fatigue. Negative for chills, diaphoresis and fever.   HENT: Negative for hearing loss, mouth sores, sore throat, tinnitus and trouble swallowing.    Eyes: Negative for pain, discharge  and redness.   Respiratory: Negative for apnea, cough, choking, chest tightness, shortness of breath, wheezing and stridor.    Cardiovascular: Negative for chest pain, palpitations and leg swelling.   Gastrointestinal: Negative for abdominal distention, abdominal pain, blood in stool, constipation, diarrhea, nausea, rectal pain and vomiting.   Endocrine: Negative for cold intolerance, heat intolerance, polydipsia, polyphagia and polyuria.   Genitourinary: Negative for difficulty urinating, dysuria, flank pain, frequency, hematuria and urgency.        ESRD on HD MWF   Musculoskeletal: Negative for arthralgias, back pain, gait problem, joint swelling, neck pain and neck stiffness.   Skin: Positive for wound. Negative for color change and rash.        Dry skin with Diffuse scabbing noted to entire body. Incision to LUE KATHERINE with sutures/staples CDI + erythema   Allergic/Immunologic: Negative for food allergies.   Neurological: Positive for weakness. Negative for dizziness, tremors, seizures, syncope, speech difficulty, light-headedness and headaches.   Hematological: Negative for adenopathy. Does not bruise/bleed easily.   Psychiatric/Behavioral: Positive for confusion. Negative for agitation. The patient is not nervous/anxious.    All other systems reviewed and are negative.    Objective:     Vital Signs (Most Recent):  Temp: 98.1 °F (36.7 °C) (04/24/17 0005)  Pulse: 66 (04/24/17 0005)  Resp: 18 (04/24/17 0005)  BP: (!) 141/77 (04/24/17 0005)  SpO2: 100 % (04/23/17 2050) Vital Signs (24h Range):  Temp:  [98.1 °F (36.7 °C)-98.6 °F (37 °C)] 98.1 °F (36.7 °C)  Pulse:  [62-69] 66  Resp:  [16-20] 18  SpO2:  [94 %-100 %] 100 %  BP: ()/(45-77) 141/77     Weight: 76.8 kg (169 lb 5 oz)  Body mass index is 24.29 kg/(m^2).    Estimated Creatinine Clearance: 11.8 mL/min (based on Cr of 6.1).    Physical Exam   Constitutional: He is oriented to person, place, and time. He appears well-developed and well-nourished. No  distress.   HENT:   Head: Normocephalic and atraumatic.   Mouth/Throat: Oropharynx is clear and moist.   Eyes: Conjunctivae and EOM are normal. Pupils are equal, round, and reactive to light. Left eye exhibits no discharge.   Neck: Normal range of motion. Neck supple. No JVD present.   Left IJ Vas Cath site WNL with DSG CDI   Cardiovascular: Normal rate, regular rhythm and intact distal pulses.  Exam reveals no gallop and no friction rub.    Murmur heard.  Pulmonary/Chest: Effort normal and breath sounds normal. No stridor. No respiratory distress. He has no wheezes. He has no rales. He exhibits no tenderness.   Comfortable on 2L NC   Abdominal: Soft. Bowel sounds are normal. He exhibits no distension and no mass. There is no tenderness. There is no rebound and no guarding.   Musculoskeletal: Normal range of motion. He exhibits no edema or tenderness.   Neurological: He is alert and oriented to person, place, and time. No cranial nerve deficit.   Falls asleep quickly.  MAEW. Follows all commands appropriately. Some confusion noted.  Poor historian.   Skin: Skin is warm and dry. No rash noted. He is not diaphoretic. No erythema.        Dry skin with diffuse scabbing noted to entire body.  Incision to LUE shunt KATHERINE, sutures/staples intact.  + erythema/ecchymosis, no active drainage.  Left hip incision healed without s/s of infection. Left heel nickel size wound with scab intact, + ecchymosis, surrounding skin erythemic, no foul odor or active drainage.           Psychiatric: He has a normal mood and affect. His behavior is normal. Judgment and thought content normal.   Nursing note and vitals reviewed.      Significant Labs:   Blood Culture:   Recent Labs  Lab 04/20/17  1730 04/20/17  1742 04/21/17  1920   LABBLOO Gram stain aer bottle: Gram positive cocci in clusters resembling Staph  Gram stain marco a bottle: Gram positive cocci in clusters resembling Staph  Positive results previously called  METHICILLIN RESISTANT  STAPHYLOCOCCUS AUREUSID consult required at [facility].[address] Gram stain marco a bottle: Gram positive cocci in clusters resembling Staph  Results called to and read back by: Rhonda Rivero RN  04/21/2017  17:32  Gram stain aer bottle: Gram positive cocci in clusters resembling Staph  METHICILLIN RESISTANT STAPHYLOCOCCUS AUREUSFor susceptibility see order # [id number] consult required at [facility].[address] Gram stain aer bottle: Gram positive cocci in clusters resembling Staph   Results called to and read back by:Mariam Iraheta RN 04/23/2017  01:20     CBC:   Recent Labs  Lab 04/22/17  0531   WBC 14.95*   HGB 10.1*   HCT 29.4*        Wound Culture: No results for input(s): LABAERO in the last 4320 hours.    Significant Imaging: I have reviewed all pertinent imaging results/findings within the past 24 hours.

## 2017-04-24 NOTE — PLAN OF CARE
Problem: Patient Care Overview  Goal: Plan of Care Review  Outcome: Ongoing (interventions implemented as appropriate)  Patient stable. Plan of care reviewed. Patient verbalizes understanding. Patient instructed to reposition. Patient c/o generalized muscle pain. Tramadol ordered.  Bed low, wheels locked, bed alarm on, call light in reach. Patient instructed to call for assistance. Will continue to monitor.

## 2017-04-24 NOTE — NURSING
Notified by  if inability to collect AM lab and blood cultures. Attempted to stick and also unsuccessful. Will get when pt is in dialysis.

## 2017-04-24 NOTE — CONSULTS
Consult Note    Consults Kaylie Reilly  SUBJECTIVE:     History of Present Illness:  Patient is a 71 y.o. male presents with sepsis possible vas cath as source. Pt cant give hx due to sepsis and wife at bedside.  Recent revision of left arm AVF with left IJ vas cath.  Dr. quesada stated he needs new access for HD.      Review of patient's allergies indicates:   Allergen Reactions    Codeine Other (See Comments)     Hallucination    Hydrocodone Other (See Comments)     Impairs vision; AMS    Percocet [oxycodone-acetaminophen] Other (See Comments)     Makes him go crazy      Doxycycline Rash and Other (See Comments)     Vision impairment  Other reaction(s): Unknown    Levaquin [levofloxacin] Rash     Blisters    Xanax [alprazolam] Palpitations     pychosis      Past Medical History:   Diagnosis Date    A-V fistula     RIGHT UPPER ARM    Anemia in chronic kidney disease 8/16/2012    Anticoagulant long-term use     Arthritis     Atrial flutter     Cancer     RIGHT RENAL CELL; SKIN CA    Chronic ischemic heart disease 8/20/2013    Chronic systolic congestive heart failure 4/12/2016    Coronary artery disease 8/16/2012    Depression     Dialysis patient     M-W-F    Elbow fracture     Encounter for blood transfusion     ESRD (end stage renal disease) on dialysis 8/16/2012    Fractured coccyx     Gout, unspecified 8/16/2012    Heel bone fracture     Hypertension 8/20/2013    Hypertension, renal 8/16/2012    Ischemic cardiomyopathy 8/20/2013    Kidney stones     Macular degeneration     Mixed hyperlipidemia 8/20/2013    Myocardial infarction     Neuromuscular disorder     JEROME (obstructive sleep apnea) 4/12/2016    JEROME on CPAP     Paroxysmal atrial flutter 5/12/2016    Personal history of skin cancer     Rheumatoid arthritis 8/16/2012    Secondary hyperparathyroidism of renal origin 8/16/2012     Past Surgical History:   Procedure Laterality Date    AV FISTULA PLACEMENT      REVISION X2     "CARDIAC CATHETERIZATION      JOINT REPLACEMENT Right     hip    PARTIAL NEPHRECTOMY Right 2008    for renal cell cancer - "stage 1" per patient.    SKIN BIOPSY      TONSILLECTOMY       Family History   Problem Relation Age of Onset    Hypertension Mother     Kidney disease Mother     Heart disease Mother     Parkinsonism Father     Cancer Brother      testicular cancer    Hyperlipidemia Brother     Hypertension Brother     Kidney disease Maternal Aunt      Social History   Substance Use Topics    Smoking status: Never Smoker    Smokeless tobacco: Never Used    Alcohol use No      Comment: Drank only while serving in [place]     ROS  OBJECTIVE:     Vital Signs:  Temp:  [97.7 °F (36.5 °C)-98.9 °F (37.2 °C)]   Pulse:  [64-87]   Resp:  [18-20]   BP: ()/(39-72)   SpO2:  [97 %]     Physical Exam     Expressed 15 cc of pus from left arm access at bedside and removed vas cath.   Laboratory:  CBC:   Recent Labs  Lab 04/24/17  0745   WBC 13.36*   RBC 3.25*   HGB 10.1*   HCT 29.1*      MCV 90   MCH 31.1*   MCHC 34.7     BMP:   Recent Labs  Lab 04/24/17  0745   GLU 64*   *   K 4.0   CL 96   CO2 21*   BUN 70*   CREATININE 7.0*   CALCIUM 10.2       Diagnostic Results:  Labs: Reviewed  as above    ASSESSMENT/PLAN:     Infected AVF    Plan: no need for any further i and d since since done at bed side with manual compression to express the pus.  Will replace left IJ vas cath Wednesday or can wait later per nephrology recs. Should improve now that AVF source infection removed. Vas cath did have mild pus as well   "

## 2017-04-24 NOTE — PROGRESS NOTES
[facility] -   Cardiology  Progress Note    Patient Name: Quinn Hutchisn  MRN: [id number]  Admission Date: 4/20/2017  Hospital Length of Stay: 4 days  Code Status: Full Code   Attending Physician: Aristeo De Jesus MD   Primary Care Physician: Jameel Almaguer MD  Expected Discharge Date:   Principal Problem:Severe sepsis    Subjective:     PT SEEN AND EXAMINED.  NO CP OR DYSPNEA.  NO FEVER THIS AM.    Review of Systems   Constitution: Positive for weakness and malaise/fatigue.   HENT: Negative.    Eyes: Negative.    Cardiovascular: Negative.    Respiratory: Negative.    Endocrine: Negative.    Skin: Negative.    Musculoskeletal: Negative.    Gastrointestinal: Negative.    Genitourinary: Negative.    Psychiatric/Behavioral: Negative.    Allergic/Immunologic: Negative.      Objective:     Vital Signs (Most Recent):  Temp: 98.8 °F (37.1 °C) (04/24/17 0922)  Pulse: 83 (04/24/17 1100)  Resp: 18 (04/24/17 1100)  BP: (!) 93/59 (04/24/17 1100)  SpO2: 97 % (04/24/17 0726) Vital Signs (24h Range):  Temp:  [97.7 °F (36.5 °C)-98.8 °F (37.1 °C)] 98.8 °F (37.1 °C)  Pulse:  [60-87] 83  Resp:  [16-20] 18  SpO2:  [97 %-100 %] 97 %  BP: ()/(55-77) 93/59     Weight: 76.8 kg (169 lb 5 oz)  Body mass index is 24.29 kg/(m^2).    SpO2: 97 %  O2 Device (Oxygen Therapy): nasal cannula      Intake/Output Summary (Last 24 hours) at 04/24/17 1138  Last data filed at 04/23/17 1200   Gross per 24 hour   Intake                0 ml   Output                0 ml   Net                0 ml       Lines/Drains/Airways     Central Venous Catheter Line                 Hemodialysis Catheter left subclavian -- days          Drain                 Hemodialysis AV Fistula Left upper arm -- days          Peripheral Intravenous Line                 Peripheral IV - Single Lumen 04/20/17 1451 Right Forearm 3 days         Peripheral IV - Single Lumen 04/20/17 1730 Right Hand 3 days                Physical Exam   Constitutional: He appears  well-developed. No distress.   HENT:   Head: Normocephalic and atraumatic.   Neck: Neck supple. No JVD present. Carotid bruit is not present. No tracheal deviation present. No thyromegaly present.   LEFT IJ VAS CATH NOTED, NO DRAINAGE   Cardiovascular: Normal rate, regular rhythm and normal heart sounds.  Exam reveals no S3 and no S4.    No murmur heard.  LEFT SQ ICD SITE WITHOUT ERYTHEMA, NONTENDER   Pulmonary/Chest: Effort normal and breath sounds normal. No respiratory distress. He has no wheezes. He has no rales.   Abdominal: Soft. Bowel sounds are normal. He exhibits no distension.   Musculoskeletal: He exhibits no edema.   Neurological: He is alert.   Skin: He is not diaphoretic.       Significant Labs:   CMP   Recent Labs  Lab 04/23/17  1216 04/24/17  0745   * 133*   K 4.0 4.0   CL 96 96   CO2 24 21*   GLU 87 64*   BUN 54* 70*   CREATININE 6.1* 7.0*   CALCIUM 10.4 10.2   ANIONGAP 13 16   ESTGFRAFRICA 10* 8*   EGFRNONAA 9* 7*   , CBC   Recent Labs  Lab 04/24/17  0745   WBC 13.36*   HGB 10.1*   HCT 29.1*       and Troponin No results for input(s): TROPONINI in the last 48 hours.    Significant Imaging: Echocardiogram:   2D echo with color flow doppler:   Results for orders placed or performed during the hospital encounter of 04/20/17   2D echo with color flow doppler   Result Value Ref Range    EF 25 (A) 55 - 65    Mitral Valve Regurgitation MILD     Aortic Valve Stenosis MILD (A)     Est. PA Systolic Pressure 44.72 (A)     Tricuspid Valve Regurgitation MILD      Assessment and Plan:         Active Diagnoses:    Diagnosis Date Noted POA    PRINCIPAL PROBLEM:   sepsis [A41.9, R65.20] 04/21/2017 Yes    Bacteremia due to Staphylococcus aureus [R78.81] 04/24/2017 Yes    Decubitus ulcer, heel [L89.609] 04/24/2017 Yes    Leukocytosis [D72.829] 04/21/2017 Yes    Anemia [D64.9] 04/20/2017 Yes    Elevated LFTs [R94.5] 04/20/2017 Unknown    Elevated troponin I level [R74.8] 01/27/2017 Yes     Paroxysmal atrial flutter [I48.92] 05/12/2016 Yes     Chronic    Syncope [R55] 05/11/2016 Yes    Chronic systolic congestive heart failure [I50.22] 04/12/2016 Yes    ESRD (end stage renal disease) [N18.6] 03/13/2014 Yes     Chronic    Hypertension, renal [I12.9] 08/16/2012 Yes     Chronic      Problems Resolved During this Admission:    Diagnosis Date Noted Date Resolved POA       VTE Risk Mitigation         Ordered     Place sequential compression device  Until discontinued      04/20/17 1832     Medium Risk of VTE  Once      04/20/17 1828          PT WELL KNOWN, FOLLOWED IN CLINIC FOR LONG TIME.  S/P SUBCUTANEOUS ICD DEC 2016, NOT INTRACARDIAC.  NO EVIDENCE OF ICD INFECTION NEAR POCKET.  I REVIEWED ECHO THIS ADMIT, AND THERE IS NO CONCERNING EVIDENCE FOR VALVULAR VEGETATION.  SUSPECT SOURCE OF MRSA IS HIS VAS CATH AND PER ID RECS THIS LIKELY SHOULD BE REPLACED.  WOULD DEFER LUZ AT PRESENT TIME AS MY CLINICAL SUSPICION FOR IE IS LOW AT PRESENT TIME, RECONSIDER IF REMAINS BACTEREMIC DESPITE ABS AND REMOVAL OF VAS CATH.  IN LIGHT OF RECURRENT ANEMIA ISSUES, WILL STOP PLAVIX AND JUST KEEP PT ON 81 MG ASA DAILY.    WILL BE AVAILABLE AS NEEDED.       Darron Anne MD  Cardiology  [facility]

## 2017-04-24 NOTE — PROGRESS NOTES
Nephrology Progress Note    Admit Date: 4/20/2017   LOS: 4 days     SUBJECTIVE:     Follow-up For:  Pt seen and chart reviewed , feels better, denies complaints ,     Scheduled Meds:   amiodarone  200 mg Oral BID    aspirin  81 mg Oral Daily    carvedilol  12.5 mg Oral BID    epoetin yadi (PROCRIT) injection  10,000 Units Intravenous Every Mon, Wed, Fri    isosorbide mononitrate  30 mg Oral Daily    pantoprazole  40 mg Oral Daily    ranolazine  500 mg Oral BID    senna-docusate 8.6-50 mg  1 tablet Oral BID    sertraline  50 mg Oral Daily    sevelamer carbonate  800 mg Oral TID WM    vancomycin (VANCOCIN) IVPB  750 mg Intravenous Q48H     Continuous Infusions:   PRN Meds:sodium chloride, acetaminophen, albumin human 25%, dextrose 50%, dextrose 50%, glucagon (human recombinant), glucose, glucose, ondansetron, promethazine (PHENERGAN) IVPB, tramadol    Review of patient's allergies indicates:   Allergen Reactions    Codeine Other (See Comments)     Hallucination    Hydrocodone Other (See Comments)     Impairs vision; AMS    Percocet [oxycodone-acetaminophen] Other (See Comments)     Makes him go crazy      Doxycycline Rash and Other (See Comments)     Vision impairment  Other reaction(s): Unknown    Levaquin [levofloxacin] Rash     Blisters    Xanax [alprazolam] Palpitations     pychosis        Review of Systems :    Constitutional: positive for fatigue and malaise  Eyes: negative  Ears, nose, mouth, throat, and face: negative  Respiratory: positive for dyspnea on exertion  Cardiovascular: negative  Gastrointestinal: negative  Hematologic/lymphatic: negative  Musculoskeletal:negative  Neurological: negative    OBJECTIVE:     Vital Signs (Most Recent)    Temp: 98 °F (36.7 °C) (04/24/17 1349)  Pulse: 64 (04/24/17 1349)  Resp: 20 (04/24/17 1349)  BP: 106/60 (04/24/17 1349)  SpO2: 97 % (04/24/17 1349)    Vital Signs Range (Last 24H):    Temp:  [97.7 °F (36.5 °C)-98.9 °F (37.2 °C)]   Pulse:  [60-87]   Resp:   [16-20]   BP: ()/(39-77)   SpO2:  [97 %-100 %]     I & O (Last 24H):    Intake/Output Summary (Last 24 hours) at 04/24/17 1502  Last data filed at 04/24/17 1255   Gross per 24 hour   Intake                0 ml   Output             1500 ml   Net            -1500 ml     Physical Exam:    Gen: WDWN male in no apparent distress  Skin: No rashes or ulcers , multiple healed scabs on both hands   Eyes: Normal conjunctiva and lids,   ENT: Normal hearing with no oropharyngeal lesions  Neck: No JVD, IJ vascath   Chest: Clear with no rales, rhonchi, wheezing with normal effort  CV: Regular with no murmurs, gallops or rubs  Abd: Soft, nontender, no distension, positive bowel sounds  Ext: No cyanosis, clubbing or edema  Neuro : non-focal     Laboratory:    CBC:     Recent Labs  Lab 04/24/17  0745   WBC 13.36*   RBC 3.25*   HGB 10.1*   HCT 29.1*      MCV 90   MCH 31.1*   MCHC 34.7     BMP:     Recent Labs  Lab 04/24/17  0745   GLU 64*   *   K 4.0   CL 96   CO2 21*   BUN 70*   CREATININE 7.0*   CALCIUM 10.2     CMP:     Recent Labs  Lab 04/21/17  0609  04/24/17  0745   GLU 62*  < > 64*   CALCIUM 9.3  < > 10.2   ALBUMIN 2.1*  2.1*  --   --    PROT 5.8*  --   --      < > 133*   K 3.4*  < > 4.0   CO2 28  < > 21*   CL 96  < > 96   BUN 43*  < > 70*   CREATININE 5.9*  < > 7.0*   ALKPHOS 115  --   --    ALT 49*  --   --    *  --   --    BILITOT 0.8  --   --    < > = values in this interval not displayed.  LFTs:     Recent Labs  Lab 04/21/17  0609   ALT 49*   *   ALKPHOS 115   BILITOT 0.8   PROT 5.8*   ALBUMIN 2.1*  2.1*     Lab Results   Component Value Date    .0 (H) 10/26/2016    CALCIUM 10.2 04/24/2017    PHOS 5.4 (H) 04/21/2017       Lab Results   Component Value Date    ALBUMIN 2.1 (L) 04/21/2017    ALBUMIN 2.1 (L) 04/21/2017         Diagnostic Results: reviewed     ASSESSMENT/PLAN:          IMPRESSION AND RECOMMENDATIONS: 81-year-old  gentleman seen in f/u for following  medical problems     1. End-stage renal disease on hemodialysis - ( MWF , C Sun ) , plan HD today , has a left IJ vascath in place,      2. Anemia - multifactorial, he had a surgical procedure about 2 weeks ago. Hemoglobin 10 gms after 3 units pRBC tx ,      3. AMS : MRSA bacteremia  ,  consult ID , on Vanc  , remove vasc cath ---placed consult and will communicate with Vascular surgery team as d/w dr. De Jesus      4. Secondary hyperparathyroidism - renal diet, renCuauhtemoc spence MD

## 2017-04-24 NOTE — SUBJECTIVE & OBJECTIVE
"Past Medical History:   Diagnosis Date    A-V fistula     RIGHT UPPER ARM    Anemia in chronic kidney disease 8/16/2012    Anticoagulant long-term use     Arthritis     Atrial flutter     Cancer     RIGHT RENAL CELL; SKIN CA    Chronic ischemic heart disease 8/20/2013    Chronic systolic congestive heart failure 4/12/2016    Coronary artery disease 8/16/2012    Depression     Dialysis patient     M-W-F    Elbow fracture     Encounter for blood transfusion     ESRD (end stage renal disease) on dialysis 8/16/2012    Fractured coccyx     Gout, unspecified 8/16/2012    Heel bone fracture     Hypertension 8/20/2013    Hypertension, renal 8/16/2012    Ischemic cardiomyopathy 8/20/2013    Kidney stones     Macular degeneration     Mixed hyperlipidemia 8/20/2013    Myocardial infarction     Neuromuscular disorder     JEROME (obstructive sleep apnea) 4/12/2016    JEROME on CPAP     Paroxysmal atrial flutter 5/12/2016    Personal history of skin cancer     Rheumatoid arthritis 8/16/2012    Secondary hyperparathyroidism of renal origin 8/16/2012       Past Surgical History:   Procedure Laterality Date    AV FISTULA PLACEMENT      REVISION X2    CARDIAC CATHETERIZATION      JOINT REPLACEMENT Right     hip    PARTIAL NEPHRECTOMY Right 2008    for renal cell cancer - "stage 1" per patient.    SKIN BIOPSY      TONSILLECTOMY         Review of patient's allergies indicates:   Allergen Reactions    Codeine Other (See Comments)     Hallucination    Hydrocodone Other (See Comments)     Impairs vision; AMS    Percocet [oxycodone-acetaminophen] Other (See Comments)     Makes him go crazy      Doxycycline Rash and Other (See Comments)     Vision impairment  Other reaction(s): Unknown    Levaquin [levofloxacin] Rash     Blisters    Xanax [alprazolam] Palpitations     pychosis        Medications:  Facility-Administered Medications Prior to Admission   Medication    hyaluronate sodium, stabilized Syrg 4 " mL     Prescriptions Prior to Admission   Medication Sig    acetaminophen (TYLENOL) 500 MG tablet Take 500 mg by mouth every 6 (six) hours as needed for Pain.    acitretin (SORIATANE) 10 MG capsule Take 10 mg by mouth every evening.     amiodarone (PACERONE) 200 MG Tab Take 200 mg by mouth 2 (two) times daily.    aspirin (ECOTRIN) 81 MG EC tablet Take 81 mg by mouth once daily.    atorvastatin (LIPITOR) 40 MG tablet TAKE 1 TABLET BY MOUTH EVERY EVENING    carvedilol (COREG) 12.5 MG tablet Take 1 tablet by mouth 2 (two) times daily.    cetirizine (ZYRTEC) 10 MG tablet Take 10 mg by mouth once daily.    cloNIDine (CATAPRES) 0.3 MG tablet Take 0.3 mg by mouth as needed.    clopidogrel (PLAVIX) 75 mg tablet Take 75 mg by mouth once daily.    docusate sodium (COLACE) 100 MG capsule Take 1 capsule (100 mg total) by mouth 3 (three) times daily as needed for Constipation.    fluticasone (FLONASE) 50 mcg/actuation nasal spray 2 sprays by Each Nare route once daily.    isosorbide mononitrate (IMDUR) 30 MG 24 hr tablet Take 1 tablet (30 mg total) by mouth once daily.    mupirocin (BACTROBAN) 2 % ointment Apply to wound infection 2 times a week for 7 days (Patient taking differently: Apply topically as needed. Apply to wound infection 2 times a week for 7 days)    nitroGLYCERIN (NITROSTAT) 0.4 MG SL tablet Place 1 tablet (0.4 mg total) under the tongue every 5 (five) minutes as needed for Chest pain.    ondansetron (ZOFRAN-ODT) 4 MG TbDL Take 2 tablets (8 mg total) by mouth every 8 (eight) hours as needed.    pantoprazole (PROTONIX) 40 MG tablet TAKE 1 TABLET (40 MG TOTAL) BY MOUTH ONCE DAILY.    predniSONE (DELTASONE) 5 MG tablet TAKE 1 TABLET(S) ORAL (BY MOUTH) EVERY DAY    pyridoxine (VITAMIN B-6) 200 mg Tab Take 1 tablet by mouth every evening.     quetiapine (SEROQUEL) 25 MG Tab Take 1 tablet (25 mg total) by mouth every evening. Prn sleep    RANEXA 500 mg Tb12 TAKE 1 TABLET TWICE A DAY    sertraline  (ZOLOFT) 50 MG tablet Take 50 mg by mouth once daily.    sevelamer carbonate (RENVELA) 800 mg Tab Take 1 tablet (800 mg total) by mouth 3 (three) times daily with meals.    thiamine 100 MG tablet Take 1 tablet (100 mg total) by mouth once daily.    tramadol (ULTRAM) 50 mg tablet Take 1 tablet (50 mg total) by mouth every 6 (six) hours as needed for Pain.     Antibiotics     Start     Stop Route Frequency Ordered    04/24/17 1700  vancomycin 1 g in dextrose 5 % 250 mL IVPB (ready to mix system)      -- IV Every 48 hours (non-standard times) 04/22/17 1255        Antifungals     None        Antivirals     None           Immunization History   Administered Date(s) Administered    Hepatitis A / Hepatitis B 08/16/2012, 02/13/2013    Hepatitis B, Adult 09/13/2012    Influenza 10/20/2006, 10/23/2007    Influenza - High Dose 10/31/2013, 10/05/2016    Influenza Whole 10/11/2014    Pneumococcal Conjugate - 13 Valent 07/23/2015    Pneumococcal Polysaccharide - 23 Valent 08/16/2012    Tdap 08/16/2012       Family History     Problem Relation (Age of Onset)    Cancer Brother    Heart disease Mother    Hyperlipidemia Brother    Hypertension Mother, Brother    Kidney disease Mother, Maternal Aunt    Parkinsonism Father        Social History     Social History    Marital status:      Spouse name: N/A    Number of children: N/A    Years of education: N/A     Occupational History          Social History Main Topics    Smoking status: Never Smoker    Smokeless tobacco: Never Used    Alcohol use No      Comment: Drank only while serving in [place]    Drug use: No    Sexual activity: Yes     Partners: Female     Other Topics Concern    None     Social History Narrative     Review of Systems   Constitutional: Positive for fatigue. Negative for chills, diaphoresis and fever.   HENT: Negative for hearing loss, mouth sores, sore throat, tinnitus and trouble swallowing.    Eyes: Negative for pain, discharge  and redness.   Respiratory: Negative for apnea, cough, choking, chest tightness, shortness of breath, wheezing and stridor.    Cardiovascular: Negative for chest pain, palpitations and leg swelling.   Gastrointestinal: Negative for abdominal distention, abdominal pain, blood in stool, constipation, diarrhea, nausea, rectal pain and vomiting.   Endocrine: Negative for cold intolerance, heat intolerance, polydipsia, polyphagia and polyuria.   Genitourinary: Negative for difficulty urinating, dysuria, flank pain, frequency, hematuria and urgency.        ESRD on HD MWF   Musculoskeletal: Negative for arthralgias, back pain, gait problem, joint swelling, neck pain and neck stiffness.   Skin: Positive for wound. Negative for color change and rash.        Dry skin with Diffuse scabbing noted to entire body. Incision to LUE KATHERINE with sutures/staples CDI + erythema   Allergic/Immunologic: Negative for food allergies.   Neurological: Positive for weakness. Negative for dizziness, tremors, seizures, syncope, speech difficulty, light-headedness and headaches.   Hematological: Negative for adenopathy. Does not bruise/bleed easily.   Psychiatric/Behavioral: Positive for confusion. Negative for agitation. The patient is not nervous/anxious.    All other systems reviewed and are negative.    Objective:     Vital Signs (Most Recent):  Temp: 98.1 °F (36.7 °C) (04/24/17 0005)  Pulse: 66 (04/24/17 0005)  Resp: 18 (04/24/17 0005)  BP: (!) 141/77 (04/24/17 0005)  SpO2: 100 % (04/23/17 2050) Vital Signs (24h Range):  Temp:  [98.1 °F (36.7 °C)-98.6 °F (37 °C)] 98.1 °F (36.7 °C)  Pulse:  [62-69] 66  Resp:  [16-20] 18  SpO2:  [94 %-100 %] 100 %  BP: ()/(45-77) 141/77     Weight: 76.8 kg (169 lb 5 oz)  Body mass index is 24.29 kg/(m^2).    Estimated Creatinine Clearance: 11.8 mL/min (based on Cr of 6.1).    Physical Exam   Constitutional: He is oriented to person, place, and time. He appears well-developed and well-nourished. No  distress.   HENT:   Head: Normocephalic and atraumatic.   Mouth/Throat: Oropharynx is clear and moist.   Eyes: Conjunctivae and EOM are normal. Pupils are equal, round, and reactive to light. Left eye exhibits no discharge.   Neck: Normal range of motion. Neck supple. No JVD present.   Left IJ Vas Cath site WNL with DSG CDI   Cardiovascular: Normal rate, regular rhythm and intact distal pulses.  Exam reveals no gallop and no friction rub.    Murmur heard.  Pulmonary/Chest: Effort normal and breath sounds normal. No stridor. No respiratory distress. He has no wheezes. He has no rales. He exhibits no tenderness.   Comfortable on 2L NC   Abdominal: Soft. Bowel sounds are normal. He exhibits no distension and no mass. There is no tenderness. There is no rebound and no guarding.   Musculoskeletal: Normal range of motion. He exhibits no edema or tenderness.   Neurological: He is alert and oriented to person, place, and time. No cranial nerve deficit.   Falls asleep quickly.  MAEW. Follows all commands appropriately. Some confusion noted.  Poor historian.   Skin: Skin is warm and dry. No rash noted. He is not diaphoretic. No erythema.        Dry skin with diffuse scabbing noted to entire body.  Incision to LUE shunt KATHERINE, sutures/staples intact.  + erythema/ecchymosis, no active drainage.  Left hip incision healed without s/s of infection. Left heel nickel size wound with scab intact, + ecchymosis, surrounding skin erythemic, no foul odor or active drainage.   Psychiatric: He has a normal mood and affect. His behavior is normal. Judgment and thought content normal.   Nursing note and vitals reviewed.      Significant Labs:   Blood Culture:   Recent Labs  Lab 04/20/17  1730 04/20/17  1742 04/21/17  1920   LABBLOO Gram stain aer bottle: Gram positive cocci in clusters resembling Staph  Gram stain marco a bottle: Gram positive cocci in clusters resembling Staph  Positive results previously called  METHICILLIN RESISTANT  STAPHYLOCOCCUS AUREUSID consult required at [facility].H[address] Gram stain marco a bottle: Gram positive cocci in clusters resembling Staph  Results called to and read back by: Rhonda Rivero RN  04/21/2017  17:32  Gram stain aer bottle: Gram positive cocci in clusters resembling Staph  METHICILLIN RESISTANT STAPHYLOCOCCUS AUREUSFor susceptibility see order # [id number] consult required at [facility].H[address] Gram stain aer bottle: Gram positive cocci in clusters resembling Staph   Results called to and read back by:Mariam Iraheta RN 04/23/2017  01:20     CBC:   Recent Labs  Lab 04/22/17  0531   WBC 14.95*   HGB 10.1*   HCT 29.4*        Wound Culture: No results for input(s): LABAERO in the last 4320 hours.    Significant Imaging: I have reviewed all pertinent imaging results/findings within the past 24 hours.

## 2017-04-24 NOTE — CLINICAL REVIEW
Clinical Pharmacy Review    Vancomycin consult day #4  Vancomycin RANDOM today (4/24) at 0745 = 15.4  Per protocol, give 750 mg post-dialysis today  Dialysis MWF  Placeholder dose of 750 mg every 48 hours beginning 4/24 at 1700  Random level due 4/26 at 0430, with AM labs  WBC 13.36, Tmax  98.5  Dx: Sepsis, source unknown (planned LUZ)  Goal trough 15-20  Blood cultures + Staph, MRSA (ID consulted)  Patient is on no other antibiotics    Pharmacy will continue to closely monitor patient and make adjustments as necessary.   Thank you for allowing us to participate in this patient's care,   Deya Echvaarria 4/24/2017 9:06 AM

## 2017-04-24 NOTE — SUBJECTIVE & OBJECTIVE
Review of Systems   Unable to perform ROS: Mental status change     Objective:     Vital Signs (Most Recent):  Temp: 97.9 °F (36.6 °C) (04/24/17 1545)  Pulse: 72 (04/24/17 1545)  Resp: 20 (04/24/17 1545)  BP: 117/67 (04/24/17 1545)  SpO2: (!) 94 % (04/24/17 1707) Vital Signs (24h Range):  Temp:  [97.7 °F (36.5 °C)-98.9 °F (37.2 °C)] 97.9 °F (36.6 °C)  Pulse:  [60-87] 72  Resp:  [18-20] 20  SpO2:  [94 %-100 %] 94 %  BP: ()/(39-77) 117/67     Weight: 76.8 kg (169 lb 5 oz)  Body mass index is 24.29 kg/(m^2).    Intake/Output Summary (Last 24 hours) at 04/24/17 1727  Last data filed at 04/24/17 1255   Gross per 24 hour   Intake                0 ml   Output             1500 ml   Net            -1500 ml      Physical Exam   Constitutional: No distress.   Frail, elderly, appears chronically ill    HENT:   Head: Normocephalic and atraumatic.   Mouth/Throat: Oropharynx is clear and moist.   Eyes: Conjunctivae are normal. Left eye exhibits no discharge.   Neck: Normal range of motion. Neck supple. No JVD present.   Left IJ Vas Cath site WNL with DSG CDI   Cardiovascular: Normal rate, regular rhythm and intact distal pulses.  Exam reveals no gallop and no friction rub.    Murmur heard.  Pulmonary/Chest: Effort normal and breath sounds normal. No stridor. No respiratory distress. He has no wheezes. He has no rales. He exhibits no tenderness.   Comfortable on 2L NC   Abdominal: Soft. Bowel sounds are normal. He exhibits no distension and no mass. There is no tenderness. There is no rebound and no guarding.   Musculoskeletal: Normal range of motion. He exhibits no edema.   Tender, abscess noted to LUE with purelent drainage  Dr. Warner drained at bedside     Neurological: He is alert. No cranial nerve deficit.   Oriented to self only   Falls asleep quickly.  MAEW. Follows all commands appropriately. Some confusion noted.  Poor historian.   Skin: Skin is warm and dry. No rash noted. He is not diaphoretic. No erythema.         Dry skin with diffuse scabbing noted to entire body.  Incision to LUE shunt KATHERINE, sutures/staples intact.  + erythema/ecchymosis, no active drainage.  Left hip incision healed without s/s of infection. Left heel nickel size wound with scab intact, + ecchymosis, surrounding skin erythemic, no foul odor or active drainage.   Psychiatric: He has a normal mood and affect. His behavior is normal.   Nursing note and vitals reviewed.      Significant Labs:   BMP:   Recent Labs  Lab 04/24/17  0745   GLU 64*   *   K 4.0   CL 96   CO2 21*   BUN 70*   CREATININE 7.0*   CALCIUM 10.2     CBC:   Recent Labs  Lab 04/24/17  0745   WBC 13.36*   HGB 10.1*   HCT 29.1*        CMP:   Recent Labs  Lab 04/23/17  1216 04/24/17  0745   * 133*   K 4.0 4.0   CL 96 96   CO2 24 21*   GLU 87 64*   BUN 54* 70*   CREATININE 6.1* 7.0*   CALCIUM 10.4 10.2   ANIONGAP 13 16   EGFRNONAA 9* 7*     All pertinent labs within the past 24 hours have been reviewed.    Significant Imaging:   Imaging Results         NM Bone Scan 3 Phase (In process)         X-Ray Foot 2 View Left (Final result) Result time:  04/21/17 21:59:23    Final result by Taisha Vergara MD (04/21/17 21:59:23)    Impression:      No plain film evidence of osteomyelitis.      Electronically signed by: TAISHA VERGARA MD  Date:     04/21/17  Time:    21:59     Narrative:    EXAM: XR FOOT 2 VIEW LEFT    CLINICAL HISTORY:  Left Heel wound, + Blood cultures, rule out as infectious source    FINDINGS:  Negative for fracture or dislocation.  No significant arthritic changes.  Osteopenia.    Bandage material present plantar aspect of the foot with small amount of soft tissue edema.            US Carotid Bilateral (Final result) Result time:  04/20/17 19:28:59    Final result by Yogesh Vergara MD (04/20/17 19:28:59)    Impression:         1.  Calcific atherosclerotic plaque in the proximal right internal carotid artery causing a 20-30% stenosis by flow velocity  measurements.  2.  Calcific atherosclerotic plaque in the proximal left internal carotid artery causing a 20% or less stenosis by velocity measurements    Stenosis of  % - validated velocity measurements with angiographic measurements, velocity criteria are extrapolated from diameter data as defined by the Society of Radiologists in Ultrasound Consensus Conference Radiology 2003; 229;340-346.      Electronically signed by: VASILE SANDOVAL MD  Date:     04/20/17  Time:    19:28     Narrative:    Exam: Carotid ultrasound    Clinical History:    Syncope     Findings:     Sonographic evaluation of the carotid systems was performed.     There is calcific plaque in the bulb of the right internal carotid artery narrowing the lumen of the vessel.  There is calcific atherosclerotic plaque in the bulb of the left internal carotid artery narrowing the lumen of the vessel.    The peak systolic velocity in the right internal carotid artery was approximately 134 cm/sec.  The right ICA to CCA peak systolic velocity ratio is 3.0.    The peak systolic velocity in the left internal carotid artery was approximately 94 cm/sec.  The left ICA to CCA peak site laceration is 1.3.    Antegrade flow noted in both vertebral arteries.            CT Head Without Contrast (Final result) Result time:  04/20/17 15:28:32    Final result by Naseem Gee MD (04/20/17 15:28:32)    Impression:      Mild generalized atrophy with white matter degeneration.  Intracranial vascular calcification.  No acute findings.        All CT scans at this facility use dose modulation, iterative reconstruction and/or weight based dosing when appropriate to reduce radiation dose to as low as reasonably achievable.       Electronically signed by: NASEEM GEE MD  Date:     04/20/17  Time:    15:28     Narrative:    CT HEAD WITHOUT CONTRAST     History:  Syncope.  Dizziness.  TIA.    Technique:  Noncontrast CT of the brain. Comparison with 03/12/2017.    Findings:   The ventricles are dilated consistent with generalized atrophy. Associated age-related white matter degeneration is present.     No significant acute findings are noted.            X-Ray Chest AP Portable (Final result) Result time:  04/20/17 15:04:54    Final result by Taisha Vergara MD (04/20/17 15:04:54)    Impression:      No significant change.  Findings most consistent with mild CHF.      Electronically signed by: TAISHA VERGARA MD  Date:     04/20/17  Time:    15:04     Narrative:    EXAM: XR CHEST AP PORTABLE    CLINICAL HISTORY: syncope.    COMPARISON STUDIES: March 12, 2017    FINDINGS:  Stable cardiomegaly.  Left chest pacer vs. AICD from inferior approach.  Dual-lumen left IJ unchanged.    Minimal vascular congestion without overt edema.  No pleural effusions evident.

## 2017-04-24 NOTE — PROGRESS NOTES
"[facility] Hospital Medicine  Progress Note    Patient Name: Quinn Hutchins  MRN: [id number]  Patient Class: IP- Inpatient   Admission Date: 4/20/2017  Length of Stay: 4 days  Attending Physician: Aristeo De Jesus MD  Primary Care Provider: Jameel Almaguer MD        Subjective:     Principal Problem:Severe sepsis    HPI:  Quinn Huthcins is a pleasant 71-year-old CM who presented to the ED today after "passing out in my car".  Patient states he was riding home with his wife when he asked for something to drink "then passed out".  Denies H/A, lightheadedness/dizziness, visual changes.  He has a history of end-stage renal disease on hemodialysis, Monday Wednesday Friday, [facility] hemodialysis unit under care of Dr. Kaylie Reilly.   On workup he has significant anemia with a hemoglobin of 7.6 noted. His hemoglobin was about 12 g a month ago. According to the patient's wife patient had a surgery on his left arm AV fistula, most likely clotted. He has an IJ Vas-Cath. Patient is oriented to person, place, and time,d but is very sleepy upon exam. He also has been having some gastrointestinal symptoms in the last few days. C/O diarrhea x 3 days, which occurred 2 days ago.  Currently he denies N/V/D.  He is due for dialysis tomorrow, blood cultures were ordered in the emergency room, he is also receiving 1 unit of packed RBC. Mild elevation of troponin I noted. He will admitted to [facility] for medical management. Dr. Church has been consulted and will follow. Cardiology consulted given extensive history with elevated troponin.      Hospital Course:  4-21- Underwent HD today and received 2 units of PRBC, with increase in Hgb to 10.1.  Peripheral BC + for gram positive cocci and clusters resembling staph.  Source may be Left IJ Vas Cath vs Left Heel wound.  Will check 1 set of BC from Vas Cath today.  Check Xray of Left foot to r/o as infectious source.  Will start on IV Vanc- pharmacy to dose. ECHO today does not show " evidence of Endocarditis. Tmax 99.2  4/22- Patient seen and examined today. H/H stable at 10/29 after transfusion. Blood cx drawn from vas cath yesterday show no growth to date. Continue current management. Consult ID.   4/23- Pt seen and examined. Plan for HD tomorrow per Nephrology. Sensitivities for BC back, sensitive to Vanc. Appreciate ID recommendations.   4/24/17: MRSA Bacteremia. Skin examination revealed abscess to left fistula- Dr. Warner drained it at bedside. Will D/C Vas cath today for line holiday. Also- pt has multiple actinic keratosis to BUE         Review of Systems   Unable to perform ROS: Mental status change     Objective:     Vital Signs (Most Recent):  Temp: 97.9 °F (36.6 °C) (04/24/17 1545)  Pulse: 72 (04/24/17 1545)  Resp: 20 (04/24/17 1545)  BP: 117/67 (04/24/17 1545)  SpO2: (!) 94 % (04/24/17 1707) Vital Signs (24h Range):  Temp:  [97.7 °F (36.5 °C)-98.9 °F (37.2 °C)] 97.9 °F (36.6 °C)  Pulse:  [60-87] 72  Resp:  [18-20] 20  SpO2:  [94 %-100 %] 94 %  BP: ()/(39-77) 117/67     Weight: 76.8 kg (169 lb 5 oz)  Body mass index is 24.29 kg/(m^2).    Intake/Output Summary (Last 24 hours) at 04/24/17 1727  Last data filed at 04/24/17 1255   Gross per 24 hour   Intake                0 ml   Output             1500 ml   Net            -1500 ml      Physical Exam   Constitutional: No distress.   Frail, elderly, appears chronically ill    HENT:   Head: Normocephalic and atraumatic.   Mouth/Throat: Oropharynx is clear and moist.   Eyes: Conjunctivae are normal. Left eye exhibits no discharge.   Neck: Normal range of motion. Neck supple. No JVD present.   Left IJ Vas Cath site WNL with DSG CDI   Cardiovascular: Normal rate, regular rhythm and intact distal pulses.  Exam reveals no gallop and no friction rub.    Murmur heard.  Pulmonary/Chest: Effort normal and breath sounds normal. No stridor. No respiratory distress. He has no wheezes. He has no rales. He exhibits no tenderness.   Comfortable on  2L NC   Abdominal: Soft. Bowel sounds are normal. He exhibits no distension and no mass. There is no tenderness. There is no rebound and no guarding.   Musculoskeletal: Normal range of motion. He exhibits no edema.   Tender, abscess noted to [facility] with purelent drainage  Dr. Warner drained at bedside     Neurological: He is alert. No cranial nerve deficit.   Oriented to self only   Falls asleep quickly.  MAEW. Follows all commands appropriately. Some confusion noted.  Poor historian.   Skin: Skin is warm and dry. No rash noted. He is not diaphoretic. No erythema.        Dry skin with diffuse scabbing noted to entire body.  Incision to LUE shunt KATHERINE, sutures/staples intact.  + erythema/ecchymosis, no active drainage.  Left hip incision healed without s/s of infection. Left heel nickel size wound with scab intact, + ecchymosis, surrounding skin erythemic, no foul odor or active drainage.   Psychiatric: He has a normal mood and affect. His behavior is normal.   Nursing note and vitals reviewed.      Significant Labs:   BMP:   Recent Labs  Lab 04/24/17  0745   GLU 64*   *   K 4.0   CL 96   CO2 21*   BUN 70*   CREATININE 7.0*   CALCIUM 10.2     CBC:   Recent Labs  Lab 04/24/17  0745   WBC 13.36*   HGB 10.1*   HCT 29.1*        CMP:   Recent Labs  Lab 04/23/17  1216 04/24/17  0745   * 133*   K 4.0 4.0   CL 96 96   CO2 24 21*   GLU 87 64*   BUN 54* 70*   CREATININE 6.1* 7.0*   CALCIUM 10.4 10.2   ANIONGAP 13 16   EGFRNONAA 9* 7*     All pertinent labs within the past 24 hours have been reviewed.    Significant Imaging:   Imaging Results         NM Bone Scan 3 Phase (In process)         X-Ray Foot 2 View Left (Final result) Result time:  04/21/17 21:59:23    Final result by Taisha Vergara MD (04/21/17 21:59:23)    Impression:      No plain film evidence of osteomyelitis.      Electronically signed by: TAISHA VERGARA MD  Date:     04/21/17  Time:    21:59     Narrative:    EXAM: XR FOOT 2 VIEW LEFT    CLINICAL  HISTORY:  Left Heel wound, + Blood cultures, rule out as infectious source    FINDINGS:  Negative for fracture or dislocation.  No significant arthritic changes.  Osteopenia.    Bandage material present plantar aspect of the foot with small amount of soft tissue edema.            US Carotid Bilateral (Final result) Result time:  04/20/17 19:28:59    Final result by Yogesh Vergara MD (04/20/17 19:28:59)    Impression:         1.  Calcific atherosclerotic plaque in the proximal right internal carotid artery causing a 20-30% stenosis by flow velocity measurements.  2.  Calcific atherosclerotic plaque in the proximal left internal carotid artery causing a 20% or less stenosis by velocity measurements    Stenosis of  % - validated velocity measurements with angiographic measurements, velocity criteria are extrapolated from diameter data as defined by the Society of Radiologists in Ultrasound Consensus Conference Radiology 2003; 229;340-346.      Electronically signed by: YOGESH VERGARA MD  Date:     04/20/17  Time:    19:28     Narrative:    Exam: Carotid ultrasound    Clinical History:    Syncope     Findings:     Sonographic evaluation of the carotid systems was performed.     There is calcific plaque in the bulb of the right internal carotid artery narrowing the lumen of the vessel.  There is calcific atherosclerotic plaque in the bulb of the left internal carotid artery narrowing the lumen of the vessel.    The peak systolic velocity in the right internal carotid artery was approximately 134 cm/sec.  The right ICA to CCA peak systolic velocity ratio is 3.0.    The peak systolic velocity in the left internal carotid artery was approximately 94 cm/sec.  The left ICA to CCA peak site laceration is 1.3.    Antegrade flow noted in both vertebral arteries.            CT Head Without Contrast (Final result) Result time:  04/20/17 15:28:32    Final result by Naseem Wang MD (04/20/17 15:28:32)    Impression:      Mild  generalized atrophy with white matter degeneration.  Intracranial vascular calcification.  No acute findings.        All CT scans at this facility use dose modulation, iterative reconstruction and/or weight based dosing when appropriate to reduce radiation dose to as low as reasonably achievable.       Electronically signed by: DAVID GEE MD  Date:     04/20/17  Time:    15:28     Narrative:    CT HEAD WITHOUT CONTRAST     History:  Syncope.  Dizziness.  TIA.    Technique:  Noncontrast CT of the brain. Comparison with 03/12/2017.    Findings:  The ventricles are dilated consistent with generalized atrophy. Associated age-related white matter degeneration is present.     No significant acute findings are noted.            X-Ray Chest AP Portable (Final result) Result time:  04/20/17 15:04:54    Final result by Taisha Vergara MD (04/20/17 15:04:54)    Impression:      No significant change.  Findings most consistent with mild CHF.      Electronically signed by: TAISHA VERGARA MD  Date:     04/20/17  Time:    15:04     Narrative:    EXAM: XR CHEST AP PORTABLE    CLINICAL HISTORY: syncope.    COMPARISON STUDIES: March 12, 2017    FINDINGS:  Stable cardiomegaly.  Left chest pacer vs. AICD from inferior approach.  Dual-lumen left IJ unchanged.    Minimal vascular congestion without overt edema.  No pleural effusions evident.            Assessment/Plan:      *  sepsis  MRSA Bacteremia   Cont IV Vancomycin   Source likely abscess to LUE which was drained at bedside per Dr. Warner   Left IJ Vas Cath will be removed today - will have line holiday x 48 hours   Currently hemodynamically stable  Bone scan pending       MRSA bacteremia  See above       Decubitus ulcer, heel  Bone scan pending       ESRD (end stage renal disease)  on HD [place]WF [place] [facility] Follows with Dr. Reilly  Care discussed with Dr. 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    I&D of LUE abscess done at bedside   Line holiday  -Left IJ Vas Cath will be removed today       Encephalopathy, metabolic  AMS-Likely secondary to sepsis   Also has dementia       VTE Risk Mitigation         Ordered     Place sequential compression device  Until discontinued      04/20/17 1832     Medium Risk of VTE  Once      04/20/17 1828          Hiral Yung NP  Department of Hospital Medicine   [facility]

## 2017-04-24 NOTE — PLAN OF CARE
Patient received hd for 3.5 hours today. Net removal 1 liter. No access problems. Adm. Epogen with hd as ordered. Dr. Reilly visited. Tolerated well.

## 2017-04-24 NOTE — PT/OT/SLP PROGRESS
Physical Therapy      Quinn ACOSTA Rainers  MRN: [id number]    ATTEMPTED P.T. TX THIS AM, PT CURRENTLY IN HEMODIALYSIS, WILL ASSESS PT NEXT VISIT    Nicci Montalvo, PT   4/24/2017  0900

## 2017-04-24 NOTE — PLAN OF CARE
Problem: Patient Care Overview  Goal: Plan of Care Review  Outcome: Ongoing (interventions implemented as appropriate)  Patient AAOx4.  Vitals stable.  No complaints of pain. Patient received dialysis.  1L removed. Bed alarm activated.  No falls on shift.  [place] encouragedL

## 2017-04-25 ENCOUNTER — PATIENT MESSAGE (OUTPATIENT)
Dept: NEPHROLOGY | Facility: CLINIC | Age: 72
End: 2017-04-25

## 2017-04-25 VITALS
TEMPERATURE: 98 F | BODY MASS INDEX: 24.24 KG/M2 | HEIGHT: 70 IN | SYSTOLIC BLOOD PRESSURE: 108 MMHG | HEART RATE: 82 BPM | RESPIRATION RATE: 18 BRPM | DIASTOLIC BLOOD PRESSURE: 58 MMHG | WEIGHT: 169.31 LBS | OXYGEN SATURATION: 98 %

## 2017-04-25 LAB
BACTERIA BLD CULT: NORMAL
BASOPHILS # BLD AUTO: 0.01 K/UL
BASOPHILS NFR BLD: 0.1 %
DIFFERENTIAL METHOD: ABNORMAL
EOSINOPHIL # BLD AUTO: 0.2 K/UL
EOSINOPHIL NFR BLD: 1.6 %
ERYTHROCYTE [DISTWIDTH] IN BLOOD BY AUTOMATED COUNT: 16.7 %
HCT VFR BLD AUTO: 29.7 %
HGB BLD-MCNC: 10.1 G/DL
LYMPHOCYTES # BLD AUTO: 0.4 K/UL
LYMPHOCYTES NFR BLD: 3.6 %
MCH RBC QN AUTO: 31.2 PG
MCHC RBC AUTO-ENTMCNC: 34 %
MCV RBC AUTO: 92 FL
MONOCYTES # BLD AUTO: 1.1 K/UL
MONOCYTES NFR BLD: 10.7 %
NEUTROPHILS # BLD AUTO: 8.7 K/UL
NEUTROPHILS NFR BLD: 84 %
PLATELET # BLD AUTO: 196 K/UL
PMV BLD AUTO: 10.2 FL
RBC # BLD AUTO: 3.24 M/UL
WBC # BLD AUTO: 10.31 K/UL

## 2017-04-25 PROCEDURE — 25000003 PHARM REV CODE 250: Performed by: INTERNAL MEDICINE

## 2017-04-25 PROCEDURE — 99233 SBSQ HOSP IP/OBS HIGH 50: CPT | Mod: ,,, | Performed by: INTERNAL MEDICINE

## 2017-04-25 PROCEDURE — 99356 PR PROLONGED SERV,INPATIENT,1ST HR: CPT | Mod: ,,, | Performed by: INTERNAL MEDICINE

## 2017-04-25 PROCEDURE — 85025 COMPLETE CBC W/AUTO DIFF WBC: CPT

## 2017-04-25 PROCEDURE — 25000003 PHARM REV CODE 250: Performed by: NURSE PRACTITIONER

## 2017-04-25 PROCEDURE — 97110 THERAPEUTIC EXERCISES: CPT

## 2017-04-25 PROCEDURE — 36415 COLL VENOUS BLD VENIPUNCTURE: CPT

## 2017-04-25 RX ORDER — AMOXICILLIN 250 MG
1 CAPSULE ORAL 2 TIMES DAILY
COMMUNITY
Start: 2017-04-25

## 2017-04-25 RX ADMIN — RANOLAZINE 500 MG: 500 TABLET, FILM COATED, EXTENDED RELEASE ORAL at 09:04

## 2017-04-25 RX ADMIN — ISOSORBIDE MONONITRATE 30 MG: 30 TABLET, EXTENDED RELEASE ORAL at 09:04

## 2017-04-25 RX ADMIN — CARVEDILOL 12.5 MG: 12.5 TABLET, FILM COATED ORAL at 09:04

## 2017-04-25 RX ADMIN — STANDARDIZED SENNA CONCENTRATE AND DOCUSATE SODIUM 1 TABLET: 8.6; 5 TABLET, FILM COATED ORAL at 09:04

## 2017-04-25 RX ADMIN — ASPIRIN 81 MG CHEWABLE TABLET 81 MG: 81 TABLET CHEWABLE at 09:04

## 2017-04-25 RX ADMIN — AMIODARONE HYDROCHLORIDE 200 MG: 200 TABLET ORAL at 09:04

## 2017-04-25 RX ADMIN — SERTRALINE HYDROCHLORIDE 50 MG: 50 TABLET ORAL at 09:04

## 2017-04-25 RX ADMIN — PANTOPRAZOLE SODIUM 40 MG: 40 TABLET, DELAYED RELEASE ORAL at 09:04

## 2017-04-25 NOTE — PLAN OF CARE
04/25/17 1330   Discharge Reassessment   Assessment Type Discharge Planning Reassessment   Can the patient answer the patient profile reliably? Yes, cognitively intact   How does the patient rate their overall health at the present time? Poor   Describe the patient's ability to walk at the present time. Major restrictions/daily assistance from another person   How often would a person be available to care for the patient? Whenever needed   Number of comorbid conditions (as recorded on the chart) Five or more   During the past month, has the patient often been bothered by feeling down, depressed or hopeless? Yes   Discharge plan remains the same: No   Provided patient/caregiver education on the expected discharge date and the discharge plan Yes   Discharge Plan A Home;Home with family;Hospice/home   Change in patient condition or support system Yes   Patient choice form signed by patient/caregiver Yes   Explained to the the patient/caregiver why the discharge planned changed: Yes   Involved the patient/caregiver in establishing a new discharge plan: Yes

## 2017-04-25 NOTE — PROGRESS NOTES
"   04/25/17 0917   WOCN Assessment   WOCN Total Time (mins) 45   Visit Date 04/25/17   Visit Time 0830   Consult Type Follow Up   WOCN Speciality Wound   Wound pressure   Number of Wounds 1   Intervention assessed;changed;team conference;orders   Teaching on-going;complication;discharge     Follow up visit to reevaluate this left heel evolving DTI. Left heel floated off mattress with doubled over pillow.  Left foot warm to touch.  4x4 bordered mepilex removed from heel to reveal wound.  Evolving DTI noted with dark purple discoloration measuring 2.5x2cm and maroon discoloration of surrounding tissue extending out 1.5cm all around wound.  Open area of proximal wound with yellow slough covering >50% of open wound base, scant serosanguinous drainage noted.  Foot cleansed with easi cleans foam wipes, doe wound skin painted with cavilon skin barrier.  Mesalt gauze cut to size and placed over wound, and 6x6 Mepilex border dressing cut to fit over heel. Heel elevated off mattress with 2 pillows.  Patient tolerated wound care well with mild pain when heel touched.  Encouraged patient to keep left heel floated off mattress, and wife coached on how to monitor this and check by passing hand between heel and mattress frequently. Right heel and sacrum also assessed and intact. Once again I noted an area of erythemia to left lateral foot at base of 5th toe. Slow to nevin. Painted with cavilon skin barrier and educated wife and patient on pressure reduction.      Please see below for wound care recommendations:     Skin Care Precautions / Pressure Ulcer Prevention:  1. Follow "Guidelines for Prevention of Pressure Ulcers in at Risk Patients"  2. Document wound assessment in [facility] using guidelines in [facility]'s "Assessment : Wound" procedure  3. Obtain Easi Cleans Foam Wipes for providing doe care - avoid the use of wash cloths to areas affected by IAD.  4. Obtain foam wedge from materials management to assist with maintaining " proper position changes at least q 2hours and document actual position in EPIC q 2hours  5. Please elevate heels off mattress and document in the frequent checks section of DOC Flow Sheets every 2 hours.  6. Do NOT elevate HOB greater than 30 degrees unless contraindicated.  7. Apply Clear Barrier Ointment to perineal / perirectal areas in a thin even layer to clean dry skin BID and after each episode of pericare  8. Apply sween 24 moisturizer cream to all dry skin after daily bath and prn  9. Gently peel back mepilex dressing at least once per 12 hour shift for skin/wound assessment and carefully reapply dressing over wound, change every 5 days and prn if soiled     Left heel DTI:  1. Cleanse with easi cleans foam wipes  2. Paint doe wound skin with cavilon skin barrier  3. Cut mesalt gauze to size and place over open wound  5. Gently peel back dressing at least once per 12 hour shift for skin/wound assessment and carefully reapply dressing over wound, change every 5 days and prn if soiled

## 2017-04-25 NOTE — PROGRESS NOTES
Nephrology Progress Note    Admit Date: 4/20/2017   LOS: 5 days     SUBJECTIVE:     Follow-up For:  Pt seen and chart reviewed , feels better, denies complaints , patient had the Vas-Cath removed 4/24/17 by Dr. Wallace      Review of Systems : Lengthy discussion about poor quality of life, failure to thrive, multiple infections and access complications for dialysis,    Constitutional: positive for fatigue and malaise  Eyes: negative  Ears, nose, mouth, throat, and face: negative  Respiratory: positive for dyspnea on exertion  Cardiovascular: negative  Gastrointestinal: negative  Hematologic/lymphatic: negative  Musculoskeletal:negative  Neurological: negative    OBJECTIVE:     Vital Signs (Most Recent)    Temp: 99.1 °F (37.3 °C) (04/25/17 0401)  Pulse: 74 (04/25/17 0401)  Resp: 20 (04/25/17 0401)  BP: 104/65 (04/25/17 0401)  SpO2: 99 % (04/25/17 0401)    Vital Signs Range (Last 24H):    Temp:  [97.8 °F (36.6 °C)-99.1 °F (37.3 °C)]   Pulse:  [64-87]   Resp:  [18-20]   BP: ()/(39-67)   SpO2:  [94 %-99 %]     I & O (Last 24H):    Intake/Output Summary (Last 24 hours) at 04/25/17 1019  Last data filed at 04/25/17 0800   Gross per 24 hour   Intake              970 ml   Output             1500 ml   Net             -530 ml     Physical Exam:    Gen: WDWN male in no apparent distress  Skin: No rashes or ulcers , multiple healed scabs on both hands   Eyes: Normal conjunctiva and lids,   ENT: Normal hearing with no oropharyngeal lesions  Neck: No JVD, IJ vascath   Chest: Clear with no rales, rhonchi, wheezing with normal effort  CV: Regular with no murmurs, gallops or rubs  Abd: Soft, nontender, no distension, positive bowel sounds  Ext: No cyanosis, clubbing or edema  Neuro : non-focal     Laboratory:    CBC:     Recent Labs  Lab 04/25/17  0554   WBC 10.31   RBC 3.24*   HGB 10.1*   HCT 29.7*      MCV 92   MCH 31.2*   MCHC 34.0     BMP:     Recent Labs  Lab 04/24/17  0745   GLU 64*   *   K 4.0   CL 96   CO2  21*   BUN 70*   CREATININE 7.0*   CALCIUM 10.2     CMP:     Recent Labs  Lab 04/21/17  0609  04/24/17  0745   GLU 62*  < > 64*   CALCIUM 9.3  < > 10.2   ALBUMIN 2.1*  2.1*  --   --    PROT 5.8*  --   --      < > 133*   K 3.4*  < > 4.0   CO2 28  < > 21*   CL 96  < > 96   BUN 43*  < > 70*   CREATININE 5.9*  < > 7.0*   ALKPHOS 115  --   --    ALT 49*  --   --    *  --   --    BILITOT 0.8  --   --    < > = values in this interval not displayed.  LFTs:     Recent Labs  Lab 04/21/17  0609   ALT 49*   *   ALKPHOS 115   BILITOT 0.8   PROT 5.8*   ALBUMIN 2.1*  2.1*     Lab Results   Component Value Date    .0 (H) 10/26/2016    CALCIUM 10.2 04/24/2017    PHOS 5.4 (H) 04/21/2017       Lab Results   Component Value Date    ALBUMIN 2.1 (L) 04/21/2017    ALBUMIN 2.1 (L) 04/21/2017         Diagnostic Results: reviewed     ASSESSMENT/PLAN:          Active Hospital Problems    Diagnosis    * sepsis    MRSA bacteremia    Decubitus ulcer, heel    Encephalopathy, metabolic    Anemia    Elevated LFTs    Elevated troponin I level    Paroxysmal atrial flutter    Syncope    Chronic systolic congestive heart failure    ESRD (end stage renal disease)    Hypertension, renal     After spending extensive time with the patient and his wife.  Multiple discussions about all his comorbid conditions were discussed at length.  It is finally decided the patient will be comfort measures only.  His ICD would be turned off.  No more dialysis.  All these discussions and decisions were discussed with hospital medicine-Dr. De Jesus, my partners in nephrology and vascular surgery including Dr. Wallace    Patient's wife is completely in agreement with no more dialysis.    Extended time was 60 minutes which was spent face-to-face with the patient and wife.  Greater than 70% of the extended time was spent in discussing all of the complex medical problems & multiple comorbid conditions.        Cuauhtemoc Reilly MD

## 2017-04-25 NOTE — PROGRESS NOTES
"[facility]  Infectious Disease  Progress Note    Patient Name: Quinn Hutchins  MRN: [id number]  Admission Date: 4/20/2017  Length of Stay: 5 days  Attending Physician: Aristeo De Jesus MD  Primary Care Provider: Jameel Almaguer MD    Isolation Status: Contact  Assessment/Plan:      ESRD (end stage renal disease)  HD as per nephrology     Chronic systolic congestive heart failure  Cardiology follow up     MRSA bacteremia  Source control is needed in all cases of staph bacteremia.    Source is abscess noted at site of previous infected fistula site which was drained by vascular surgery .  Left vascular catheter was also removed and he will get line holiday .    Will treat for 4 weeks with IV vanco.    Decubitus ulcer, heel  Follow bone scan to   rule out osteomyelitis      Anticipated Disposition:     Thank you for your consult. I will follow-up with patient. Please contact us if you have any additional questions.    Sami Abarca MD  Infectious Disease  [facility]    Subjective:     Principal Problem:Severe sepsis    HPI: 71-year-old man with past history of infected AV fistula who presented to the ED with history of syncope . Patient states he was riding home with his wife when he asked for something to drink "then passed out". Denies H/A, lightheadedness/dizziness, visual changes. He has a history of end-stage renal disease on hemodialysis, Monday Wednesday Friday. On workup he has significant anemia with a hemoglobin of 7.6 noted. His hemoglobin was about 12 g a month ago. He has an IJ Vas-Cath.    Since admission, blood culture is now positive for MRSA, he also has left heel decubitus ulcer and multiple skin lesions from skin cancer .  Lab data showed wbc of 14.95.Cardiac echo showed EF of 25 , no vegetation     Interval History: 71 year old man with prior history of left AV fistula infection admitted with MRSA bacteremia.   Purulence noted from site of previous infected fistula " .  Right vasc vicente removed-has pus too.  Repeat blood culture pending .    Review of Systems   Constitutional: Positive for fatigue. Negative for chills, diaphoresis and fever.   HENT: Negative for hearing loss, mouth sores, sore throat, tinnitus and trouble swallowing.    Eyes: Negative for pain, discharge and redness.   Respiratory: Negative for apnea, cough, choking, chest tightness, shortness of breath, wheezing and stridor.    Cardiovascular: Negative for chest pain, palpitations and leg swelling.   Gastrointestinal: Negative for abdominal distention, abdominal pain, blood in stool, constipation, diarrhea, nausea, rectal pain and vomiting.   Endocrine: Negative for cold intolerance, heat intolerance, polydipsia, polyphagia and polyuria.   Genitourinary: Negative for difficulty urinating, dysuria, flank pain, frequency, hematuria and urgency.        ESRD on HD MWF   Musculoskeletal: Negative for arthralgias, back pain, gait problem, joint swelling, neck pain and neck stiffness.   Skin: Positive for wound. Negative for color change and rash.        Dry skin with Diffuse scabbing noted to entire body. Incision to LUE KATHERINE with sutures/staples CDI + erythema   Allergic/Immunologic: Negative for food allergies.   Neurological: Positive for weakness. Negative for dizziness, tremors, seizures, syncope, speech difficulty, light-headedness and headaches.   Hematological: Negative for adenopathy. Does not bruise/bleed easily.   Psychiatric/Behavioral: Positive for confusion. Negative for agitation. The patient is not nervous/anxious.    All other systems reviewed and are negative.    Objective:     Vital Signs (Most Recent):  Temp: 98 °F (36.7 °C) (04/24/17 2340)  Pulse: 76 (04/24/17 2340)  Resp: 18 (04/24/17 2340)  BP: (!) 114/57 (04/24/17 2340)  SpO2: 99 % (04/24/17 2340) Vital Signs (24h Range):  Temp:  [97.7 °F (36.5 °C)-98.9 °F (37.2 °C)] 98 °F (36.7 °C)  Pulse:  [60-87] 76  Resp:  [18-20] 18  SpO2:  [94 %-99 %] 99  %  BP: ()/(39-72) 114/57     Weight: 76.8 kg (169 lb 5 oz)  Body mass index is 24.29 kg/(m^2).    Estimated Creatinine Clearance: 10.3 mL/min (based on Cr of 7).    Physical Exam   Constitutional: He is oriented to person, place, and time. He appears well-developed and well-nourished. No distress.   HENT:   Head: Normocephalic and atraumatic.   Mouth/Throat: Oropharynx is clear and moist.   Eyes: Conjunctivae and EOM are normal. Pupils are equal, round, and reactive to light. Left eye exhibits no discharge.   Neck: Normal range of motion. Neck supple. No JVD present.   Left IJ Vas Cath site WNL with DSG CDI   Cardiovascular: Normal rate, regular rhythm and intact distal pulses.  Exam reveals no gallop and no friction rub.    No murmur heard.  Pulmonary/Chest: Effort normal and breath sounds normal. No stridor. No respiratory distress. He has no wheezes. He has no rales. He exhibits no tenderness.   Comfortable on 2L NC   Abdominal: Soft. Bowel sounds are normal. He exhibits no distension and no mass. There is no tenderness. There is no rebound and no guarding.   Musculoskeletal: Normal range of motion. He exhibits no edema or tenderness.   Neurological: He is alert and oriented to person, place, and time. No cranial nerve deficit.   Falls asleep quickly.  MAEW. Follows all commands appropriately. Some confusion noted.  Poor historian.   Skin: Skin is warm and dry. No rash noted. He is not diaphoretic. No erythema.        Dry skin with diffuse scabbing noted to entire body.  Incision to LUE shunt KATHERINE, sutures/staples intact.  + erythema/ecchymosis, no active drainage.  Left hip incision healed without s/s of infection. Left heel nickel size wound with scab intact, + ecchymosis, surrounding skin erythemic, no foul odor or active drainage.   Psychiatric: He has a normal mood and affect. His behavior is normal. Judgment and thought content normal.   Nursing note and vitals reviewed.      Significant Labs:   Blood  Culture:   Recent Labs  Lab 04/20/17  1730 04/20/17  1742 04/21/17  1920 04/24/17  0745   LABBLOO Gram stain aer bottle: Gram positive cocci in clusters resembling Staph  Gram stain marco a bottle: Gram positive cocci in clusters resembling Staph  Positive results previously called  METHICILLIN RESISTANT STAPHYLOCOCCUS AUREUSID consult required at [facility].H[address] Gram stain marco a bottle: Gram positive cocci in clusters resembling Staph  Results called to and read back by: Rhonda Rivero RN  04/21/2017  17:32  Gram stain aer bottle: Gram positive cocci in clusters resembling Staph  METHICILLIN RESISTANT STAPHYLOCOCCUS AUREUSFor susceptibility see order # [id number] consult required at [facility].[address] Gram stain aer bottle: Gram positive cocci in clusters resembling Staph   Results called to and read back by:Mariam Iraheta RN 04/23/2017  01:20  STAPHYLOCOCCUS AUREUSSusceptibility pendingID consult required at [facility].[address] No Growth to date     BMP:   Recent Labs  Lab 04/24/17  0745   GLU 64*   *   K 4.0   CL 96   CO2 21*   BUN 70*   CREATININE 7.0*   CALCIUM 10.2     CBC:   Recent Labs  Lab 04/24/17  0745   WBC 13.36*   HGB 10.1*   HCT 29.1*          Significant Imaging: I have reviewed all pertinent imaging results/findings within the past 24 hours.

## 2017-04-25 NOTE — PROGRESS NOTES
Call placed to [employer] [facility] [phone] who spoke with wife and will come to their home tomorrow to turn off the AICD. ppt

## 2017-04-25 NOTE — PLAN OF CARE
Call received from Wilver Hanson patient's family member stating that they have decided to use [facility]. Preference letter obtained. Contacted Renetta Nascimento Clinical Liaison with [facility] notified of referral. Referral faxed to [facility] via [facility].

## 2017-04-25 NOTE — PT/OT/SLP PROGRESS
Physical Therapy  Treatment    Quinn Hutchins   MRN: [id number]   Admitting Diagnosis: Severe sepsis    PT Received On: 17  PT Start Time: 1031     PT Stop Time: 1043    PT Total Time (min): 12 min       Billable Minutes:  Therapeutic Exercise 12    Treatment Type: Treatment  PT/PTA: PT     PTA Visit Number: 1       General Precautions: Standard, fall  Orthopedic Precautions: N/A   Braces:           Subjective:  Communicated with NURSE IBRAHIM AND EPIC CHART REVIEW  prior to session.  PT AGREED TO TX     Pain Ratin/10              Pain Rating Post-Intervention: 0/10    Objective:   Patient found with: telemetry, peripheral IV, oxygen    Functional Mobility:  Bed Mobility:   Rolling/Turning Right: Moderate assistance  Supine to Sit: Moderate Assistance  Sit to Supine: Moderate Assistance    Transfers:       Therapeutic Activities and Exercises:  PT SEATED EOB WITH MOD A. PT COMPLETED SEATED TE 2X10 REPS OF AP, TKE, MIP AND GLUT SETS WITH PT BODY JERKING D/T INC FATIGUE. PT SUP IN BED WITH MOD A AND LEFT SUP WITH ALL NEEDS MET      AM-PAC 6 CLICK MOBILITY  How much help from another person does this patient currently need?   1 = Unable, Total/Dependent Assistance  2 = A lot, Maximum/Moderate Assistance  3 = A little, Minimum/Contact Guard/Supervision  4 = None, Modified [place]/Independent         AM-PAC Raw Score CMS G-Code Modifier Level of Impairment Assistance   6 % Total / Unable   7 - 9 CM 80 - 100% Maximal Assist   10 - 14 CL 60 - 80% Moderate Assist   15 - 19 CK 40 - 60% Moderate Assist   20 - 22 CJ 20 - 40% Minimal Assist   23 CI 1-20% SBA / CGA   24 CH 0% Independent/ Mod I     Patient left supine with call button in reach.    Assessment:  PT MARBELLA MIN TE. PT WITH INC FATIGUE NOTED TODAY.     Rehab identified problem list/impairments: Rehab identified problem list/impairments: weakness, impaired functional mobilty, impaired balance, gait instability, impaired self care skills, impaired  endurance, decreased ROM, decreased lower extremity function    Rehab potential is good.    Activity tolerance: Fair    Discharge recommendations: Discharge Facility/Level Of Care Needs: nursing facility, skilled     Barriers to discharge: Barriers to Discharge: Decreased caregiver support    Equipment recommendations: Equipment Needed After Discharge: none     GOALS:   Physical Therapy Goals        Problem: Physical Therapy Goal    Goal Priority Disciplines Outcome Goal Variances Interventions   Physical Therapy Goal     PT/OT, PT Ongoing (interventions implemented as appropriate)            Problem: Physical Therapy Goal    Goal Priority Disciplines Outcome Goal Variances Interventions   Physical Therapy Goal     PT/OT, PT                PLAN:    Patient to be seen 5 x/week  to address the above listed problems via gait training, therapeutic activities, therapeutic exercises  Plan of Care expires: 04/29/17  Plan of Care reviewed with: patient         Ijeoma Celestine, PT  04/25/2017

## 2017-04-25 NOTE — PLAN OF CARE
04/25/17 1526   Final Note   Assessment Type Final Discharge Note   Discharge Disposition HospiceHome

## 2017-04-25 NOTE — PLAN OF CARE
Family meeting : all family members in agreement for [facility]     D/w dr. Liza Reilly MD

## 2017-04-25 NOTE — PROGRESS NOTES
Discussed discharge instructions with family. IVs d/c. Family had no questions or concerns upon discharge. Patient awaiting staff to be wheeled off.

## 2017-04-25 NOTE — PLAN OF CARE
Verbal consult received to speak with patient's wife and family about hospice services at home. Met with patient's wife and several family members. They do not want to discuss anything infront of the patient until his brother arrives, Gave contact information and instructed her to call me when they would like to discuss home hospice services. Update to Hiral Yung NP requesting provider.

## 2017-04-25 NOTE — OP NOTE
DATE OF PROCEDURE:  04/24/2017    ATTENDING:  Adan Wallace M.D.    ASSISTANT:  None.    COMPLICATIONS:  None.    CONDITION:  Stable.    DRAINS:  None.    PACKINGS:  None.    BLOOD LOSS:  None.    SPECIMENS:  None.    FLUIDS:  None.    ANESTHESIA:  Local.    PREOPERATIVE DIAGNOSIS:  Infected left IJ Vas-Cath.    POSTOPERATIVE DIAGNOSIS:  Infected left IJ Vas-Cath.    PROCEDURE:  Removal of left IJ Vas-Cath at bedside.    INDICATION FOR PROCEDURE:  This patient has sepsis with possible Vas-Cath   infection and therefore, the above-mentioned procedure was performed.    PROCEDURE IN DETAIL:  The Vas-Cath site was prepped with an antiseptic.  The   sutures were removed. Lidocaine 1% was injected into the surrounding area. Sharp   and blunt dissection was used to dissect the Dacron cuff from the subcutaneous   pocket and away from the skin. The catheter was removed intact. A pressure   dressing was applied.  The patient tolerated the procedure well without   incident.    OPERATIVE FINDINGS:  Successful removal of Vas-Cath with mild purulence around   the insertion site.      KHRIS  dd: 04/24/2017 16:59:53 (CDT)  td: 04/24/2017 22:11:30 (CD)  Doc ID   #[id number]  Job ID #[id number]    CC:

## 2017-04-25 NOTE — PLAN OF CARE
Problem: Patient Care Overview  Goal: Plan of Care Review  Outcome: Ongoing (interventions implemented as appropriate)  Wound POC reviewed with patient and wife including continueing to keep left heel floated at all times and turn and reposition frequently, and daily dressing changes to left heel.

## 2017-04-25 NOTE — SUBJECTIVE & OBJECTIVE
Interval History: 71 year old man with prior history of left AV fistula infection admitted with MRSA bacteremia.   Purulence noted from site of previous infected fistula .  Right vasc vicente removed-has pus too.  Repeat blood culture pending .    Review of Systems   Constitutional: Positive for fatigue. Negative for chills, diaphoresis and fever.   HENT: Negative for hearing loss, mouth sores, sore throat, tinnitus and trouble swallowing.    Eyes: Negative for pain, discharge and redness.   Respiratory: Negative for apnea, cough, choking, chest tightness, shortness of breath, wheezing and stridor.    Cardiovascular: Negative for chest pain, palpitations and leg swelling.   Gastrointestinal: Negative for abdominal distention, abdominal pain, blood in stool, constipation, diarrhea, nausea, rectal pain and vomiting.   Endocrine: Negative for cold intolerance, heat intolerance, polydipsia, polyphagia and polyuria.   Genitourinary: Negative for difficulty urinating, dysuria, flank pain, frequency, hematuria and urgency.        ESRD on HD MWF   Musculoskeletal: Negative for arthralgias, back pain, gait problem, joint swelling, neck pain and neck stiffness.   Skin: Positive for wound. Negative for color change and rash.        Dry skin with Diffuse scabbing noted to entire body. Incision to LUE KATHERINE with sutures/staples CDI + erythema   Allergic/Immunologic: Negative for food allergies.   Neurological: Positive for weakness. Negative for dizziness, tremors, seizures, syncope, speech difficulty, light-headedness and headaches.   Hematological: Negative for adenopathy. Does not bruise/bleed easily.   Psychiatric/Behavioral: Positive for confusion. Negative for agitation. The patient is not nervous/anxious.    All other systems reviewed and are negative.    Objective:     Vital Signs (Most Recent):  Temp: 98 °F (36.7 °C) (04/24/17 2340)  Pulse: 76 (04/24/17 2340)  Resp: 18 (04/24/17 2340)  BP: (!) 114/57 (04/24/17 2340)  SpO2:  99 % (04/24/17 2340) Vital Signs (24h Range):  Temp:  [97.7 °F (36.5 °C)-98.9 °F (37.2 °C)] 98 °F (36.7 °C)  Pulse:  [60-87] 76  Resp:  [18-20] 18  SpO2:  [94 %-99 %] 99 %  BP: ()/(39-72) 114/57     Weight: 76.8 kg (169 lb 5 oz)  Body mass index is 24.29 kg/(m^2).    Estimated Creatinine Clearance: 10.3 mL/min (based on Cr of 7).    Physical Exam   Constitutional: He is oriented to person, place, and time. He appears well-developed and well-nourished. No distress.   HENT:   Head: Normocephalic and atraumatic.   Mouth/Throat: Oropharynx is clear and moist.   Eyes: Conjunctivae and EOM are normal. Pupils are equal, round, and reactive to light. Left eye exhibits no discharge.   Neck: Normal range of motion. Neck supple. No JVD present.   Left IJ Vas Cath site WNL with DSG CDI   Cardiovascular: Normal rate, regular rhythm and intact distal pulses.  Exam reveals no gallop and no friction rub.    No murmur heard.  Pulmonary/Chest: Effort normal and breath sounds normal. No stridor. No respiratory distress. He has no wheezes. He has no rales. He exhibits no tenderness.   Comfortable on 2L NC   Abdominal: Soft. Bowel sounds are normal. He exhibits no distension and no mass. There is no tenderness. There is no rebound and no guarding.   Musculoskeletal: Normal range of motion. He exhibits no edema or tenderness.   Neurological: He is alert and oriented to person, place, and time. No cranial nerve deficit.   Falls asleep quickly.  MAEW. Follows all commands appropriately. Some confusion noted.  Poor historian.   Skin: Skin is warm and dry. No rash noted. He is not diaphoretic. No erythema.        Dry skin with diffuse scabbing noted to entire body.  Incision to LUE shunt KATHERINE, sutures/staples intact.  + erythema/ecchymosis, no active drainage.  Left hip incision healed without s/s of infection. Left heel nickel size wound with scab intact, + ecchymosis, surrounding skin erythemic, no foul odor or active drainage.    Psychiatric: He has a normal mood and affect. His behavior is normal. Judgment and thought content normal.   Nursing note and vitals reviewed.      Significant Labs:   Blood Culture:   Recent Labs  Lab 04/20/17  1730 04/20/17  1742 04/21/17  1920 04/24/17  0745   LABBLOO Gram stain aer bottle: Gram positive cocci in clusters resembling Staph  Gram stain marco a bottle: Gram positive cocci in clusters resembling Staph  Positive results previously called  METHICILLIN RESISTANT STAPHYLOCOCCUS AUREUSID consult required at [facility].H[address] Gram stain marco a bottle: Gram positive cocci in clusters resembling Staph  Results called to and read back by: Rhonda Rivero RN  04/21/2017  17:32  Gram stain aer bottle: Gram positive cocci in clusters resembling Staph  METHICILLIN RESISTANT STAPHYLOCOCCUS AUREUSFor susceptibility see order # [id number] consult required at [facility].[address] Gram stain aer bottle: Gram positive cocci in clusters resembling Staph   Results called to and read back by:Mariam Iraheta RN 04/23/2017  01:20  STAPHYLOCOCCUS AUREUSSusceptibility pendingID consult required at [facility].[address] No Growth to date     BMP:   Recent Labs  Lab 04/24/17  0745   GLU 64*   *   K 4.0   CL 96   CO2 21*   BUN 70*   CREATININE 7.0*   CALCIUM 10.2     CBC:   Recent Labs  Lab 04/24/17  0745   WBC 13.36*   HGB 10.1*   HCT 29.1*          Significant Imaging: I have reviewed all pertinent imaging results/findings within the past 24 hours.

## 2017-04-25 NOTE — PLAN OF CARE
Problem: Patient Care Overview  Goal: Plan of Care Review  Outcome: Ongoing (interventions implemented as appropriate)  Pt had an uneventful night. Vital signs stable. No acute distress. Pt repositioned q 2 hours, feet elevated on pillows. Contact precautions maintained. Pt afebrile. NSR  With BBB noted on monitor. No falls or injury this shift. Bed low, side rails up x 2. Spouse at bedside.

## 2017-04-26 NOTE — PROGRESS NOTES
"[facility]  Infectious Disease  Progress Note    Patient Name: Quinn Hutchins  MRN: [id number]  Admission Date: 4/20/2017  Length of Stay: 5 days  Attending Physician: No att. providers found  Primary Care Provider: Jameel Almaguer MD    Isolation Status: Contact  Assessment/Plan:      ESRD (end stage renal disease)  Family has decided to stop HD after discussion with nephrology .    MRSA bacteremia  Will stop therapy if family  Opts for Hospice care . He will no longer have HD as he has no vascular access    Decubitus ulcer, heel   bone scan did not show osteomyelitis       Anticipated Disposition:     Thank you for your consult. I will sign off. Please contact us if you have any additional questions.    Sami Abarca MD  Infectious Disease  [facility]    Subjective:     Principal Problem:Severe sepsis    HPI: 71-year-old man with past history of infected AV fistula who presented to the ED with history of syncope . Patient states he was riding home with his wife when he asked for something to drink "then passed out". Denies H/A, lightheadedness/dizziness, visual changes. He has a history of end-stage renal disease on hemodialysis, Monday Wednesday Friday. On workup he has significant anemia with a hemoglobin of 7.6 noted. His hemoglobin was about 12 g a month ago. He has an IJ Vas-Cath.    Since admission, blood culture is now positive for MRSA, he also has left heel decubitus ulcer and multiple skin lesions from skin cancer .  Lab data showed wbc of 14.95.Cardiac echo showed EF of 25 , no vegetation     Interval History: 71 year old man with prior history of left AV fistula infection admitted with MRSA bacteremia.   Purulence noted from site of previous infected fistula .  Right vasc vicente removed-has pus too.  Repeat blood culture pending .  He was seen by Nephrology and family has decided to stop HD  Review of Systems   Constitutional: Positive for fatigue. Negative for chills, " diaphoresis and fever.   HENT: Negative for hearing loss, mouth sores, sore throat, tinnitus and trouble swallowing.    Eyes: Negative for pain, discharge and redness.   Respiratory: Negative for apnea, cough, choking, chest tightness, shortness of breath, wheezing and stridor.    Cardiovascular: Negative for chest pain, palpitations and leg swelling.   Gastrointestinal: Negative for abdominal distention, abdominal pain, blood in stool, constipation, diarrhea, nausea, rectal pain and vomiting.   Endocrine: Negative for cold intolerance, heat intolerance, polydipsia, polyphagia and polyuria.   Genitourinary: Negative for difficulty urinating, dysuria, flank pain, frequency, hematuria and urgency.        ESRD on HD MWF   Musculoskeletal: Negative for arthralgias, back pain, gait problem, joint swelling, neck pain and neck stiffness.   Skin: Positive for wound. Negative for color change and rash.        Dry skin with Diffuse scabbing noted to entire body. Incision to LUE KATHERINE with sutures/staples CDI + erythema   Allergic/Immunologic: Negative for food allergies.   Neurological: Positive for weakness. Negative for dizziness, tremors, seizures, syncope, speech difficulty, light-headedness and headaches.   Hematological: Negative for adenopathy. Does not bruise/bleed easily.   Psychiatric/Behavioral: Positive for confusion. Negative for agitation. The patient is not nervous/anxious.    All other systems reviewed and are negative.    Objective:     Vital Signs (Most Recent):  Temp: 98 °F (36.7 °C) (04/25/17 1635)  Pulse: 82 (04/25/17 1635)  Resp: 18 (04/25/17 1635)  BP: (!) 108/58 (04/25/17 1635)  SpO2: 98 % (04/25/17 1635) Vital Signs (24h Range):  Temp:  [97.7 °F (36.5 °C)-99.1 °F (37.3 °C)] 98 °F (36.7 °C)  Pulse:  [73-82] 82  Resp:  [18-20] 18  SpO2:  [98 %-99 %] 98 %  BP: (104-122)/(57-75) 108/58     Weight: 76.8 kg (169 lb 5 oz)  Body mass index is 24.29 kg/(m^2).    Estimated Creatinine Clearance: 10.3 mL/min (based  on Cr of 7).    Physical Exam   Constitutional: He is oriented to person, place, and time. He appears well-developed and well-nourished. No distress.   HENT:   Head: Normocephalic and atraumatic.   Mouth/Throat: Oropharynx is clear and moist.   Eyes: Conjunctivae and EOM are normal. Pupils are equal, round, and reactive to light. Left eye exhibits no discharge.   Neck: Normal range of motion. Neck supple. No JVD present.   Left IJ Vas Cath site WNL with DSG CDI   Cardiovascular: Normal rate, regular rhythm and intact distal pulses.  Exam reveals no gallop and no friction rub.    No murmur heard.  Pulmonary/Chest: Effort normal and breath sounds normal. No stridor. No respiratory distress. He has no wheezes. He has no rales. He exhibits no tenderness.   Comfortable on 2L NC   Abdominal: Soft. Bowel sounds are normal. He exhibits no distension and no mass. There is no tenderness. There is no rebound and no guarding.   Musculoskeletal: Normal range of motion. He exhibits no edema or tenderness.   Neurological: He is alert and oriented to person, place, and time. No cranial nerve deficit.   Falls asleep quickly.  MAEW. Follows all commands appropriately. Some confusion noted.  Poor historian.   Skin: Skin is warm and dry. No rash noted. He is not diaphoretic. No erythema.        Dry skin with diffuse scabbing noted to entire body.  Incision to LUE shunt KATHERINE, sutures/staples intact.  + erythema/ecchymosis, no active drainage.  Left hip incision healed without s/s of infection. Left heel nickel size wound with scab intact, + ecchymosis, surrounding skin erythemic, no foul odor or active drainage.   Psychiatric: He has a normal mood and affect. His behavior is normal. Judgment and thought content normal.   Nursing note and vitals reviewed.      Significant Labs:   Blood Culture:     Recent Labs  Lab 04/20/17  1730 04/20/17  1742 04/21/17  1920 04/24/17  0745 04/24/17  1734   LABBLOO Gram stain aer bottle: Gram positive  cocci in clusters resembling Staph  Gram stain marco a bottle: Gram positive cocci in clusters resembling Staph  Positive results previously called  METHICILLIN RESISTANT STAPHYLOCOCCUS AUREUSID consult required at [facility].Hwy Gram stain marco a bottle: Gram positive cocci in clusters resembling Staph  Results called to and read back by: Rhonda Rivero RN  04/21/2017  17:32  Gram stain aer bottle: Gram positive cocci in clusters resembling Staph  METHICILLIN RESISTANT STAPHYLOCOCCUS AUREUSFor susceptibility see order # [id number] consult required at [facility].Hwy Gram stain aer bottle: Gram positive cocci in clusters resembling Staph   Results called to and read back by:Mariam Iraheta RN 04/23/2017  01:20  METHICILLIN RESISTANT STAPHYLOCOCCUS AUREUSID consult required at [facility].Hwy Gram stain aer bottle: Gram positive cocci in clusters resembling Staph   Results called to and read back by: Declan López RN  04/25/2017    11:59 No Growth to date     BMP:     Recent Labs  Lab 04/24/17  0745   GLU 64*   *   K 4.0   CL 96   CO2 21*   BUN 70*   CREATININE 7.0*   CALCIUM 10.2     CBC:     Recent Labs  Lab 04/24/17  0745 04/25/17  0554   WBC 13.36* 10.31   HGB 10.1* 10.1*   HCT 29.1* 29.7*    196       Significant Imaging: I have reviewed all pertinent imaging results/findings within the past 24 hours.

## 2017-04-26 NOTE — SUBJECTIVE & OBJECTIVE
Interval History: 71 year old man with prior history of left AV fistula infection admitted with MRSA bacteremia.   Purulence noted from site of previous infected fistula .  Right vasc vicente removed-has pus too.  Repeat blood culture pending .  He was seen by Nephrology and family has decided to stop HD  Review of Systems   Constitutional: Positive for fatigue. Negative for chills, diaphoresis and fever.   HENT: Negative for hearing loss, mouth sores, sore throat, tinnitus and trouble swallowing.    Eyes: Negative for pain, discharge and redness.   Respiratory: Negative for apnea, cough, choking, chest tightness, shortness of breath, wheezing and stridor.    Cardiovascular: Negative for chest pain, palpitations and leg swelling.   Gastrointestinal: Negative for abdominal distention, abdominal pain, blood in stool, constipation, diarrhea, nausea, rectal pain and vomiting.   Endocrine: Negative for cold intolerance, heat intolerance, polydipsia, polyphagia and polyuria.   Genitourinary: Negative for difficulty urinating, dysuria, flank pain, frequency, hematuria and urgency.        ESRD on HD MWF   Musculoskeletal: Negative for arthralgias, back pain, gait problem, joint swelling, neck pain and neck stiffness.   Skin: Positive for wound. Negative for color change and rash.        Dry skin with Diffuse scabbing noted to entire body. Incision to LUE KATHERINE with sutures/staples CDI + erythema   Allergic/Immunologic: Negative for food allergies.   Neurological: Positive for weakness. Negative for dizziness, tremors, seizures, syncope, speech difficulty, light-headedness and headaches.   Hematological: Negative for adenopathy. Does not bruise/bleed easily.   Psychiatric/Behavioral: Positive for confusion. Negative for agitation. The patient is not nervous/anxious.    All other systems reviewed and are negative.    Objective:     Vital Signs (Most Recent):  Temp: 98 °F (36.7 °C) (04/25/17 1635)  Pulse: 82 (04/25/17 1635)  Resp:  18 (04/25/17 1635)  BP: (!) 108/58 (04/25/17 1635)  SpO2: 98 % (04/25/17 1635) Vital Signs (24h Range):  Temp:  [97.7 °F (36.5 °C)-99.1 °F (37.3 °C)] 98 °F (36.7 °C)  Pulse:  [73-82] 82  Resp:  [18-20] 18  SpO2:  [98 %-99 %] 98 %  BP: (104-122)/(57-75) 108/58     Weight: 76.8 kg (169 lb 5 oz)  Body mass index is 24.29 kg/(m^2).    Estimated Creatinine Clearance: 10.3 mL/min (based on Cr of 7).    Physical Exam   Constitutional: He is oriented to person, place, and time. He appears well-developed and well-nourished. No distress.   HENT:   Head: Normocephalic and atraumatic.   Mouth/Throat: Oropharynx is clear and moist.   Eyes: Conjunctivae and EOM are normal. Pupils are equal, round, and reactive to light. Left eye exhibits no discharge.   Neck: Normal range of motion. Neck supple. No JVD present.   Left IJ Vas Cath site WNL with DSG CDI   Cardiovascular: Normal rate, regular rhythm and intact distal pulses.  Exam reveals no gallop and no friction rub.    No murmur heard.  Pulmonary/Chest: Effort normal and breath sounds normal. No stridor. No respiratory distress. He has no wheezes. He has no rales. He exhibits no tenderness.   Comfortable on 2L NC   Abdominal: Soft. Bowel sounds are normal. He exhibits no distension and no mass. There is no tenderness. There is no rebound and no guarding.   Musculoskeletal: Normal range of motion. He exhibits no edema or tenderness.   Neurological: He is alert and oriented to person, place, and time. No cranial nerve deficit.   Falls asleep quickly.  MAEW. Follows all commands appropriately. Some confusion noted.  Poor historian.   Skin: Skin is warm and dry. No rash noted. He is not diaphoretic. No erythema.        Dry skin with diffuse scabbing noted to entire body.  Incision to LUE shunt KATHERINE, sutures/staples intact.  + erythema/ecchymosis, no active drainage.  Left hip incision healed without s/s of infection. Left heel nickel size wound with scab intact, + ecchymosis,  surrounding skin erythemic, no foul odor or active drainage.   Psychiatric: He has a normal mood and affect. His behavior is normal. Judgment and thought content normal.   Nursing note and vitals reviewed.      Significant Labs:   Blood Culture:     Recent Labs  Lab 04/20/17  1730 04/20/17  1742 04/21/17  1920 04/24/17  0745 04/24/17  1734   LABBLOO Gram stain aer bottle: Gram positive cocci in clusters resembling Staph  Gram stain marco a bottle: Gram positive cocci in clusters resembling Staph  Positive results previously called  METHICILLIN RESISTANT STAPHYLOCOCCUS AUREUSID consult required at [facility].Hwy Gram stain marco a bottle: Gram positive cocci in clusters resembling Staph  Results called to and read back by: Rhonda Rivero RN  04/21/2017  17:32  Gram stain aer bottle: Gram positive cocci in clusters resembling Staph  METHICILLIN RESISTANT STAPHYLOCOCCUS AUREUSFor susceptibility see order # [id number] consult required at [facility].Hwy Gram stain aer bottle: Gram positive cocci in clusters resembling Staph   Results called to and read back by:Mariam Iraheta RN 04/23/2017  01:20  METHICILLIN RESISTANT STAPHYLOCOCCUS AUREUSID consult required at [facility].Hwy Gram stain aer bottle: Gram positive cocci in clusters resembling Staph   Results called to and read back by: Declan López RN  04/25/2017    11:59 No Growth to date     BMP:     Recent Labs  Lab 04/24/17  0745   GLU 64*   *   K 4.0   CL 96   CO2 21*   BUN 70*   CREATININE 7.0*   CALCIUM 10.2     CBC:     Recent Labs  Lab 04/24/17  0745 04/25/17  0554   WBC 13.36* 10.31   HGB 10.1* 10.1*   HCT 29.1* 29.7*    196       Significant Imaging: I have reviewed all pertinent imaging results/findings within the past 24 hours.

## 2017-04-27 LAB
BACTERIA BLD CULT: NORMAL

## 2017-04-27 NOTE — DISCHARGE SUMMARY
"[facility] Hospital Medicine  Discharge Summary      Patient Name: Quinn Hutchins  MRN: [id number]  Admission Date: 4/20/2017  Hospital Length of Stay: 5 days  Discharge Date: 4/25/17  Attending Physician Dr. De Jesus   Discharging Provider: Hiral Yung NP  Primary Care Provider: Jameel Alamguer MD      HPI:   Quinn Hutchins is a pleasant 71-year-old CM who presented to the ED today after "passing out in my car".  Patient states he was riding home with his wife when he asked for something to drink "then passed out".  Denies H/A, lightheadedness/dizziness, visual changes.  He has a history of end-stage renal disease on hemodialysis, Monday Wednesday Friday, [facility] hemodialysis unit under care of Dr. Kaylie Reilly.   On workup he has significant anemia with a hemoglobin of 7.6 noted. His hemoglobin was about 12 g a month ago. According to the patient's wife patient had a surgery on his left arm AV fistula, most likely clotted. He has an IJ Vas-Cath. Patient is oriented to person, place, and time,d but is very sleepy upon exam. He also has been having some gastrointestinal symptoms in the last few days. C/O diarrhea x 3 days, which occurred 2 days ago.  Currently he denies N/V/D.  He is due for dialysis tomorrow, blood cultures were ordered in the emergency room, he is also receiving 1 unit of packed RBC. Mild elevation of troponin I noted. He will admitted to [facility] for medical management. Dr. Church has been consulted and will follow. Cardiology consulted given extensive history with elevated troponin.      * No surgery found *      Indwelling Lines/Drains at time of discharge:   Lines/Drains/Airways     Drain                 Hemodialysis AV Fistula Left upper arm -- days          Pressure Ulcer                 Pressure Ulcer 04/19/17 Left heel suspected deep tissue injury 7 days              Hospital Course:   The pt was admitted with Sepsis and found to have a MRSA Bacteremia and LUE shunt abscess. Dr." Timmy I&D abscess. Pt had a left IJ vas cath for HD. He received HD as scheduled as well as PRBC.    Repeat blood cultures remained positive. Vas cath was removed. Pt had recurrent access issues.   After his primary Nephrologist, Dr. Reilly, spent extensive time discussing current status and options with the patient and his wife. Multiple discussions about all his comorbid conditions were discussed at length. It was decided the patient will be comfort measures only and go home with Hospice. His ICD would be turned off. No more dialysis.  The pt was seen and examined today and determined to be stable for discharge with home hospice.           Consults:   Consults         Status Ordering Provider     Inpatient consult to Cardiology  Once     Provider:  Ramón De MD    Completed TRENT CARVAJAL     Inpatient consult to Nephrology  Once     Provider:  Fermin Church MD    Completed DIANA GONZALEZ          Significant Diagnostic Studies:  Imaging Results         NM Bone Scan 3 Phase (Final result) Result time:  04/25/17 09:45:54    Final result by Constance Morales MD (04/25/17 09:45:54)    Impression:     No evidence of osteomyelitis      Electronically signed by: CONSTANCE MORALES MD  Date:     04/25/17  Time:    09:45     Narrative:    History: Left heel ulcer, rule out osteomyelitis    Blood flow, blood pool, and delayed images were obtained following intravenous ministration of 25 mCi technetium 99m MDP.    There is slightly increased flow and blood pool activity in the left foot relative to the right. There is no abnormal focal bone uptake, with particular attention to the calcaneus, to indicate osteomyelitis.            X-Ray Foot 2 View Left (Final result) Result time:  04/21/17 21:59:23    Final result by Taisha Sandoval MD (04/21/17 21:59:23)    Impression:      No plain film evidence of osteomyelitis.      Electronically signed by: TAISHA SANDOVAL MD  Date:     04/21/17  Time:    21:59     Narrative:     EXAM: XR FOOT 2 VIEW LEFT    CLINICAL HISTORY:  Left Heel wound, + Blood cultures, rule out as infectious source    FINDINGS:  Negative for fracture or dislocation.  No significant arthritic changes.  Osteopenia.    Bandage material present plantar aspect of the foot with small amount of soft tissue edema.            US Carotid Bilateral (Final result) Result time:  04/20/17 19:28:59    Final result by Yogesh Vergara MD (04/20/17 19:28:59)    Impression:         1.  Calcific atherosclerotic plaque in the proximal right internal carotid artery causing a 20-30% stenosis by flow velocity measurements.  2.  Calcific atherosclerotic plaque in the proximal left internal carotid artery causing a 20% or less stenosis by velocity measurements    Stenosis of  % - validated velocity measurements with angiographic measurements, velocity criteria are extrapolated from diameter data as defined by the Society of Radiologists in Ultrasound Consensus Conference Radiology 2003; 229;340-346.      Electronically signed by: YOGESH VERGARA MD  Date:     04/20/17  Time:    19:28     Narrative:    Exam: Carotid ultrasound    Clinical History:    Syncope     Findings:     Sonographic evaluation of the carotid systems was performed.     There is calcific plaque in the bulb of the right internal carotid artery narrowing the lumen of the vessel.  There is calcific atherosclerotic plaque in the bulb of the left internal carotid artery narrowing the lumen of the vessel.    The peak systolic velocity in the right internal carotid artery was approximately 134 cm/sec.  The right ICA to CCA peak systolic velocity ratio is 3.0.    The peak systolic velocity in the left internal carotid artery was approximately 94 cm/sec.  The left ICA to CCA peak site laceration is 1.3.    Antegrade flow noted in both vertebral arteries.            CT Head Without Contrast (Final result) Result time:  04/20/17 15:28:32    Final result by Naseem Wang MD  (04/20/17 15:28:32)    Impression:      Mild generalized atrophy with white matter degeneration.  Intracranial vascular calcification.  No acute findings.        All CT scans at this facility use dose modulation, iterative reconstruction and/or weight based dosing when appropriate to reduce radiation dose to as low as reasonably achievable.       Electronically signed by: DAVID GEE MD  Date:     04/20/17  Time:    15:28     Narrative:    CT HEAD WITHOUT CONTRAST     History:  Syncope.  Dizziness.  TIA.    Technique:  Noncontrast CT of the brain. Comparison with 03/12/2017.    Findings:  The ventricles are dilated consistent with generalized atrophy. Associated age-related white matter degeneration is present.     No significant acute findings are noted.            X-Ray Chest AP Portable (Final result) Result time:  04/20/17 15:04:54    Final result by Taisha Vergara MD (04/20/17 15:04:54)    Impression:      No significant change.  Findings most consistent with mild CHF.      Electronically signed by: TAISHA VERGARA MD  Date:     04/20/17  Time:    15:04     Narrative:    EXAM: XR CHEST AP PORTABLE    CLINICAL HISTORY: syncope.    COMPARISON STUDIES: March 12, 2017    FINDINGS:  Stable cardiomegaly.  Left chest pacer vs. AICD from inferior approach.  Dual-lumen left IJ unchanged.    Minimal vascular congestion without overt edema.  No pleural effusions evident.              Pending Diagnostic Studies:     None        Final Active Diagnoses:    Diagnosis Date Noted POA    PRINCIPAL PROBLEM:   sepsis [A41.9, R65.20] 04/21/2017 Yes    MRSA bacteremia [R78.81] 04/24/2017 Yes    Decubitus ulcer, heel [L89.609] 04/24/2017 Yes    ESRD (end stage renal disease) [N18.6] 03/13/2014 Yes     Chronic    Encephalopathy, metabolic [G93.41] 04/24/2017 Yes    Anemia [D64.9] 04/20/2017 Yes    Elevated LFTs [R94.5] 04/20/2017 Unknown    Elevated troponin I level [R74.8] 01/27/2017 Yes    Paroxysmal atrial flutter  [I48.92] 05/12/2016 Yes     Chronic    Syncope [R55] 05/11/2016 Yes    Chronic systolic congestive heart failure [I50.22] 04/12/2016 Yes    Hypertension, renal [I12.9] 08/16/2012 Yes     Chronic      Problems Resolved During this Admission:    Diagnosis Date Noted Date Resolved POA          Discharged Condition: stable    Disposition: Hospice/Home    Follow Up:  Follow-up Information     Follow up with [facility].    Specialty:  Hospice and Palliative Medicine    Why:  Hospice    Contact information:    [id number]  [facility] [id number] [phone]          Patient Instructions:     Diet general   Order Specific Question Answer Comments   Additional restrictions: Renal      Activity as tolerated       Medications:  Reconciled Home Medications:   Discharge Medication List as of 4/26/2017 12:39 PM      START taking these medications    Details   senna-docusate 8.6-50 mg (PERICOLACE) 8.6-50 mg per tablet Take 1 tablet by mouth 2 (two) times daily., Starting 4/25/2017, Until Discontinued, OTC         CONTINUE these medications which have NOT CHANGED    Details   acetaminophen (TYLENOL) 500 MG tablet Take 500 mg by mouth every 6 (six) hours as needed for Pain., Until Discontinued, Historical Med      acitretin (SORIATANE) 10 MG capsule Take 10 mg by mouth every evening. , Starting 4/30/2015, Until Discontinued, Historical Med      amiodarone (PACERONE) 200 MG Tab Take 200 mg by mouth 2 (two) times daily., Starting 4/5/2017, Until Discontinued, Historical Med      aspirin (ECOTRIN) 81 MG EC tablet Take 81 mg by mouth once daily., Until Discontinued, Historical Med      atorvastatin (LIPITOR) 40 MG tablet TAKE 1 TABLET BY MOUTH EVERY EVENING, Normal      carvedilol (COREG) 12.5 MG tablet Take 1 tablet by mouth 2 (two) times daily., Starting 4/5/2017, Until Discontinued, Historical Med      cetirizine (ZYRTEC) 10 MG tablet Take 10 mg by mouth once daily., Until Discontinued, Historical Med       clopidogrel (PLAVIX) 75 mg tablet Take 75 mg by mouth once daily., Until Discontinued, Historical Med      docusate sodium (COLACE) 100 MG capsule Take 1 capsule (100 mg total) by mouth 3 (three) times daily as needed for Constipation., Starting 5/16/2016, Until Discontinued, OTC      fluticasone (FLONASE) 50 mcg/actuation nasal spray 2 sprays by Each Nare route once daily., Starting 4/18/2017, Until Thu 5/18/17, Normal      isosorbide mononitrate (IMDUR) 30 MG 24 hr tablet Take 1 tablet (30 mg total) by mouth once daily., Starting 12/30/2016, Until Sat 12/30/17, No Print      mupirocin (BACTROBAN) 2 % ointment Apply to wound infection 2 times a week for 7 days, Normal      nitroGLYCERIN (NITROSTAT) 0.4 MG SL tablet Place 1 tablet (0.4 mg total) under the tongue every 5 (five) minutes as needed for Chest pain., Starting 4/25/2016, Until Discontinued, Normal      ondansetron (ZOFRAN-ODT) 4 MG TbDL Take 2 tablets (8 mg total) by mouth every 8 (eight) hours as needed., Starting 1/28/2017, Until Discontinued, Normal      pantoprazole (PROTONIX) 40 MG tablet TAKE 1 TABLET (40 MG TOTAL) BY MOUTH ONCE DAILY., Normal      predniSONE (DELTASONE) 5 MG tablet TAKE 1 TABLET(S) ORAL (BY MOUTH) EVERY DAY, Normal      pyridoxine (VITAMIN B-6) 200 mg Tab Take 1 tablet by mouth every evening. , Until Discontinued, Historical Med      quetiapine (SEROQUEL) 25 MG Tab Take 1 tablet (25 mg total) by mouth every evening. Prn sleep, Starting 4/18/2017, Until Discontinued, Normal      RANEXA 500 mg Tb12 TAKE 1 TABLET TWICE A DAY, Normal      sertraline (ZOLOFT) 50 MG tablet Take 50 mg by mouth once daily., Starting 4/5/2017, Until Discontinued, Historical Med      sevelamer carbonate (RENVELA) 800 mg Tab Take 1 tablet (800 mg total) by mouth 3 (three) times daily with meals., Starting 5/16/2016, Until Tue 5/16/17, Normal      thiamine 100 MG tablet Take 1 tablet (100 mg total) by mouth once daily., Starting 5/16/2016, Until Discontinued,  Normal      tramadol (ULTRAM) 50 mg tablet Take 1 tablet (50 mg total) by mouth every 6 (six) hours as needed for Pain., Starting 3/16/2017, Until Discontinued, Print         STOP taking these medications       cloNIDine (CATAPRES) 0.3 MG tablet Comments:   Reason for Stopping:             Time spent on the discharge of patient: 45 minutes    Hiral Yung NP  Department of Hospital Medicine  [facility]

## 2017-04-28 LAB
BACTERIA BLD CULT: NORMAL

## 2017-05-05 ENCOUNTER — PATIENT MESSAGE (OUTPATIENT)
Dept: NEPHROLOGY | Facility: CLINIC | Age: 72
End: 2017-05-05

## 2017-05-06 ENCOUNTER — DOCUMENTATION ONLY (OUTPATIENT)
Dept: NEPHROLOGY | Facility: HOSPITAL | Age: 72
End: 2017-05-06

## 2017-05-16 NOTE — ASSESSMENT & PLAN NOTE
bone scan did not show osteomyelitis   
-  Admit to tele  -  Check ECHO  -  Carotid US   -  Orthostatics q shift  -  Serial troponins  -  Cardiology Consult  - CT head shows no significant findings    
-  No additional episodes since admission  -  Cardiology Consulted- Feels patient had Vtach/Fib related syncope with AICD firing  -  ECHO shows EF of 20-25%  -  Carotid US shows Calcific atherosclerotic plaque in the proximal right internal carotid artery causing a 20-30% stenosis, Left has 20% stenosis  -  Orthostatics q shift  -  Serial troponins stable at 0.627 (elevation likely secondary to ICD firing)  -  CT head shows no significant findings  - Monitor    
- BP currently on low side  - Check orthostatics given syncope  -  Hold home meds for now  -  Monitor    
- BP suboptimal  - Continue Coreg  -  Monitor    
- Could have contributed to syncopal episode  - To receive 1 Unit PRBCs per renal today and 2 Units with HD tomorrow  - CBC in AM  - No active s/s of bleeding    
- Currently rate controlled and in NSR  - Continue Amio  - Monitor  - Not on long term OAC    
- ECHO ordered  - Last ECHO in 10/16 shows EF of 20%  - CXR reviewed  - BNP >4900 (h/o ESRD on HD MWF)  - BP on low side- Hold Coreg for now  - Plan for HD in AM  - Cardiology consult    
- ECHO shows EF of 25%  - CXR reviewed  - BNP >4900 (h/o ESRD on HD MWF)  - Continue Coreg   - Plan for HD MWF  - Cardiology following  
- Hold home Statin  - Repeat LFTs in AM    
- Patient has vas cath, blood cx drawn from vas michael yesterday, NGTD  - BC + MRSA bacteremia  - Repeat CBC in AM  - CXR does not reveal infectious process  - Temp of 100.1 noted  
- Patient has vas cath, blood cx drawn from vas michael yesterday, NGTD  - BC + staph  - Repeat CBC in AM  - CXR does not reveal infectious process  - Temp of 100.1 noted  
- Peripheral BC positive for gram + cocci and clusters  - Will check BC from Left IJ Vas Cath Today  - WBC elevated to 14K from 12K  - Source may be Left IJ Vas Cath versus Left heel wound  - Check Left Foot Xray to r/o infectious source  - Check lactic acid today  - Continue Vancomycin- pharmacy to dose  - Currently hemodynamically stable  - Consult infectious disease    
- Peripheral BC positive for gram + cocci and clusters  - Will check BC from Left IJ Vas Cath Today  - WBC elevated to 14K from 12K  - Source may be Left IJ Vas Cath versus Left heel wound  - Check Left Foot Xray to r/o infectious source  - Check lactic acid today  - Start Vancomycin- pharmacy to dose  - T max 99.2  - Currently hemodynamically stable    
- Peripheral BC positive for gram + cocci and clusters, sensitive to vanc  - Will check BC from Left IJ Vas Cath Today  - WBC elevated to 14K from 12K  - Source may be Left IJ Vas Cath versus Left heel wound  - Check Left Foot Xray to r/o infectious source  - Check lactic acid today  - Continue Vancomycin- pharmacy to dose  - Currently hemodynamically stable  - Consult infectious disease    
- Secondary to ESRD  - Could have contributed to syncopal episode  - Rceived 1 Unit PRBCs Thursday and 2 Units with HD  - Hgb stable at 10.1  - Continue Epo  - CBC in AM      
- Secondary to ESRD  - Could have contributed to syncopal episode  - Rceived 1 Unit PRBCs Thursday and 2 Units with HD today  - Hgb stable at 10.1  - Continue Epo  - CBC in AM      
- Secondary to ESRD  - Could have contributed to syncopal episode  - Rceived 1 Unit PRBCs Thursday and 2 Units with HD yesterday  - Hgb stable at 10.1  - Continue Epo  - CBC in AM      
- Trending down  - Hold home Statin for now      
- Troponin elevated to 0.657  - 12 lead EKG reviewed  - D/W Dr. De- to see patient to r/o ACS  - Serial Troponins  - Resume home ASA/Plavix  - Hold off on resuming Lipitor secondary to elevated LFTs  - Check Lipid Panel  
- Troponin elevated to 0.657, trending down  - 12 lead EKG reviewed  - D/W Dr. De, likely secondary to Vtach/Fib AICD firing  - Serial Troponins stable   - Continue home ASA/Plavix  - Hold off on resuming Lipitor secondary to elevated LFTs  - Lipid Panel Reviewed  - Awaiting Nuclear stress test per cardiology  
- Troponin elevated to 0.657, trending down  - 12 lead EKG reviewed  - D/W Dr. De, likely secondary to Vtach/Fib AICD firing  - Serial Troponins stable   - Continue home ASA/Plavix  - Hold off on resuming Lipitor secondary to elevated LFTs  - Lipid Panel Reviewed  - Family does not want NM stress test today, wants to schedule outpatient.   
- Troponin elevated to 0.657, trending down  - 12 lead EKG reviewed  - D/W Dr. De, likely secondary to Vtach/Fib AICD firing  - Serial Troponins stable   - Continue home ASA/Plavix  - Hold off on resuming Lipitor secondary to elevated LFTs  - Lipid Panel Reviewed  - Family does not want NM stress test today.   
- WBC elevated to 12K  - Patient has vas cath-  Need to r/o as potential source  - BC drawn in ER  - Repeat CBC in AM  - CXR does not reveal infectious process  - Temp of 100.1 noted  
[place] [facility]. Reilly  - Tolerated HD today  - Dr. 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  Shunt currently not functioning  - Has Left IJ Vas Cath    
- on HD MWF at [facility]- Follows with Dr. Reilly  - Tolerated HD today  - Dr. 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  Shunt currently not functioning  - Has Left IJ Vas Cath    
- on HD MWF at [facility] [place] [facility]- Follows with Dr. Reilly  - Dr. Church consulted, will follow here  - Recent surgery to LUE shunt- patient unable to elaborate    
- on HD MWF at [facility] [place] [facility]- Follows with Dr. Reilly  - Dr. 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  Shunt currently not functioning  - Has Left IJ Vas Cath  - Plan for HD in AM   
AMS-Likely secondary to sepsis   Also has dementia     
Bone scan pending     
Cardiology follow up   
Cardiology follow up   
Family has decided to stop HD after discussion with nephrology .  
Follow bone scan to   rule out osteomyelitis  
HD as per nephrology   
HD as per nephrology   
MRSA Bacteremia   Cont IV Vancomycin   Source likely abscess to LUE which was drained at bedside per Dr. Warner   Left IJ Vas Cath will be removed today - will have line holiday x 48 hours   Currently hemodynamically stable  Bone scan pending     
See above    
See above     
Source control is needed in all cases of staph bacteremia.    Source is abscess noted at site of previous infected fistula site which was drained by vascular surgery .  Left vascular catheter was also removed and he will get line holiday .    Will treat for 4 weeks with IV vanco.  
Source control is needed in all cases of staph bacteremia.  Possible sources are the left IJ-which will need to be changed , endocarditis /icd infection, left heel decubitus/skin infection.  Will plan for LUZ,   repeat blood culture and will remove left IJ when blood is now sterile prior to discharge if other source of MRSA is found.  Continue vanco    
Will do bone scan to rule out osteomyelitis  
Will stop therapy if family  Opts for Hospice care . He will no longer have HD as he has no vascular access  
[place] [facility] [place]. Reilly  Care discussed with Dr. 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    I&D of LUE abscess done at bedside   Line holiday -Left IJ Vas Cath will be removed today     
Plan: Moisturize daily
Detail Level: Zone

## 2023-03-13 NOTE — PROGRESS NOTES
Cardiology Progress Note        SUBJECTIVE:     History of Present Illness:  Patient is a 71 y.o. male presents with VF/VT s/p ICD firing. CHF, acute sever anemia.  Improved breathing and fatigue after transfusion down yesterday      OBJECTIVE:     Vital Signs (Most Recent)  Temp: 97.5 °F (36.4 °C) (04/22/17 1130)  Pulse: 67 (04/22/17 1130)  Resp: 18 (04/22/17 1130)  BP: (!) 103/58 (04/22/17 1130)  SpO2: 95 % (04/22/17 1130)    Vital Signs Range (Last 24H):  Temp:  [97.4 °F (36.3 °C)-98.7 °F (37.1 °C)]   Pulse:  [67-93]   Resp:  [16-19]   BP: (103-147)/()   SpO2:  [94 %-98 %]     Intake/Output last 3 shifts:  I/O last 3 completed shifts:  In: 2521.3 [P.O.:560; Blood:1211.3; Other:500; IV Piggyback:250]  Out: 2200 [Other:2200]    Intake/Output this shift:       Review of patient's allergies indicates:   Allergen Reactions    Codeine Other (See Comments)     Hallucination    Hydrocodone Other (See Comments)     Impairs vision; AMS    Percocet [oxycodone-acetaminophen] Other (See Comments)     Makes him go crazy      Doxycycline Rash and Other (See Comments)     Vision impairment  Other reaction(s): Unknown    Levaquin [levofloxacin] Rash     Blisters    Xanax [alprazolam] Palpitations     pychosis        Current Facility-Administered Medications   Medication    0.9%  NaCl infusion (for blood administration)    0.9%  NaCl infusion (for blood administration)    acetaminophen tablet 650 mg    albumin human 25% bottle 12.5 g    amiodarone tablet 200 mg    aspirin chewable tablet 81 mg    carvedilol tablet 12.5 mg    clopidogrel tablet 75 mg    dextrose 50% injection 12.5 g    dextrose 50% injection 25 g    epoetin yadi injection 10,000 Units    glucagon (human recombinant) injection 1 mg    glucose chewable tablet 16 g    glucose chewable tablet 24 g    heparin (porcine) injection 2,000 Units    isosorbide mononitrate 24 hr tablet 30 mg    ondansetron injection 4 mg    pantoprazole EC tablet  40 mg    promethazine (PHENERGAN) 6.25 mg in dextrose 5 % 50 mL IVPB    ranolazine 12 hr tablet 500 mg    senna-docusate 8.6-50 mg per tablet 1 tablet    sertraline tablet 50 mg    sevelamer carbonate tablet 800 mg    [START ON 4/23/2017] vancomycin 1 g in dextrose 5 % 250 mL IVPB (ready to mix system)     No current facility-administered medications on file prior to encounter.      Current Outpatient Prescriptions on File Prior to Encounter   Medication Sig    acetaminophen (TYLENOL) 500 MG tablet Take 500 mg by mouth every 6 (six) hours as needed for Pain.    acitretin (SORIATANE) 10 MG capsule Take 10 mg by mouth every evening.     amiodarone (PACERONE) 200 MG Tab Take 200 mg by mouth 2 (two) times daily.    aspirin (ECOTRIN) 81 MG EC tablet Take 81 mg by mouth once daily.    atorvastatin (LIPITOR) 40 MG tablet TAKE 1 TABLET BY MOUTH EVERY EVENING    carvedilol (COREG) 12.5 MG tablet Take 1 tablet by mouth 2 (two) times daily.    cetirizine (ZYRTEC) 10 MG tablet Take 10 mg by mouth once daily.    cloNIDine (CATAPRES) 0.3 MG tablet Take 0.3 mg by mouth as needed.    clopidogrel (PLAVIX) 75 mg tablet Take 75 mg by mouth once daily.    docusate sodium (COLACE) 100 MG capsule Take 1 capsule (100 mg total) by mouth 3 (three) times daily as needed for Constipation.    fluticasone (FLONASE) 50 mcg/actuation nasal spray 2 sprays by Each Nare route once daily.    isosorbide mononitrate (IMDUR) 30 MG 24 hr tablet Take 1 tablet (30 mg total) by mouth once daily.    mupirocin (BACTROBAN) 2 % ointment Apply to wound infection 2 times a week for 7 days (Patient taking differently: Apply topically as needed. Apply to wound infection 2 times a week for 7 days)    nitroGLYCERIN (NITROSTAT) 0.4 MG SL tablet Place 1 tablet (0.4 mg total) under the tongue every 5 (five) minutes as needed for Chest pain.    ondansetron (ZOFRAN-ODT) 4 MG TbDL Take 2 tablets (8 mg total) by mouth every 8 (eight) hours as needed.     pantoprazole (PROTONIX) 40 MG tablet TAKE 1 TABLET (40 MG TOTAL) BY MOUTH ONCE DAILY.    predniSONE (DELTASONE) 5 MG tablet TAKE 1 TABLET(S) ORAL (BY MOUTH) EVERY DAY    pyridoxine (VITAMIN B-6) 200 mg Tab Take 1 tablet by mouth every evening.     quetiapine (SEROQUEL) 25 MG Tab Take 1 tablet (25 mg total) by mouth every evening. Prn sleep    RANEXA 500 mg Tb12 TAKE 1 TABLET TWICE A DAY    sertraline (ZOLOFT) 50 MG tablet Take 50 mg by mouth once daily.    sevelamer carbonate (RENVELA) 800 mg Tab Take 1 tablet (800 mg total) by mouth 3 (three) times daily with meals.    thiamine 100 MG tablet Take 1 tablet (100 mg total) by mouth once daily.    tramadol (ULTRAM) 50 mg tablet Take 1 tablet (50 mg total) by mouth every 6 (six) hours as needed for Pain.       Physical Exam:  Constitutional: He is oriented to person, place, and time. He appears well-nourished.   HENT:   Head: Normocephalic.   Eyes: Pupils are equal, round, and reactive to light.   Neck: Normal carotid pulses and no JVD present. Carotid bruit is not present. No thyromegaly present.   Cardiovascular: Normal rate, regular rhythm, normal heart sounds and normal pulses. No extrasystoles are present. PMI is not displaced. Exam reveals no gallop and no S3.   No murmur heard.  Pulmonary/Chest: Breath sounds normal. No stridor. No respiratory distress.   Crackles on bases   Abdominal: Soft. Bowel sounds are normal. There is no tenderness. There is no rebound.   Musculoskeletal: Normal range of motion.   Neurological: He is alert and oriented to person, place, and time.   Skin: Skin is intact. No rash noted.   Left arm s/p staples of fistula   Psychiatric: His behavior is normal  Laboratory:  Chemistry:   Lab Results   Component Value Date     04/21/2017    K 3.4 (L) 04/21/2017    CL 96 04/21/2017    CO2 28 04/21/2017    BUN 43 (H) 04/21/2017    CREATININE 5.9 (H) 04/21/2017    CALCIUM 9.3 04/21/2017     Cardiac Markers:   Lab Results    Component Value Date    CKMB 0.9 10/08/2012    TROPONINI 0.627 (H) 04/21/2017    TROPONINI 0.04 10/08/2012     Cardiac Markers (Last 3):   Lab Results   Component Value Date    CKMB 0.9 10/08/2012    CKMB 2.4 08/03/2011    CKMB 3.0 08/01/2011    TROPONINI 0.627 (H) 04/21/2017    TROPONINI 0.627 (H) 04/21/2017    TROPONINI 0.657 (H) 04/20/2017    TROPONINI 0.04 10/08/2012    TROPONINI 0.21 (H) 08/03/2011    TROPONINI 0.92 (H) 08/01/2011     CBC:   Lab Results   Component Value Date    WBC 14.95 (H) 04/22/2017    HGB 10.1 (L) 04/22/2017    HCT 29.4 (L) 04/22/2017    HCT 35 (L) 12/27/2016    MCV 91 04/22/2017     04/22/2017     Lipids:   Lab Results   Component Value Date    CHOL 76 (L) 04/21/2017    TRIG 103 04/21/2017    HDL 11 (L) 04/21/2017     Coagulation:   Lab Results   Component Value Date    INR 1.0 03/12/2017    APTT 26.8 03/12/2017       Diagnostic Results:  ECG: Reviewed  X-Ray: Reviewed  US: Reviewed  CT: Reviewed  Echo: Reviewed      ASSESSMENT/PLAN:     Patient Active Problem List   Diagnosis    Organ transplant candidate    Hypertension, renal    Rheumatoid arthritis involving both hands with negative rheumatoid factor    Coronary artery disease    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    Secondary hyperparathyroidism of renal origin    Refractive error - Both Eyes    Chronic ischemic heart disease    Mixed hyperlipidemia    Ischemic cardiomyopathy    Abnormal ECG    Skin cancer of scalp    ESRD (end stage renal disease)    Knee pain    Osteoporosis    Essential hypertension    A-V fistula    Problem with dialysis shunt    JEROME (obstructive sleep apnea)    Chronic systolic congestive heart failure    GERD (gastroesophageal reflux disease)    Syncope    LVH (left ventricular hypertrophy)    Pulmonary HTN    Paroxysmal atrial flutter    Cervical spinal stenosis    Atrial flutter with rapid ventricular response    Back pain    Complication  of AV dialysis fistula    Current chronic use of systemic steroids    SVT (supraventricular tachycardia)    NSVT (nonsustained ventricular tachycardia)    Hematoma of arm    Renal failure    Osteoarthritis of right knee    S/P implantation of automatic cardioverter/defibrillator (AICD)    Elevated troponin I level    Nausea & vomiting    Closed left hip fracture    Fall (on) (from) other stairs and steps, initial encounter    Anemia    Elevated LFTs    Leukocytosis     sepsis      Per presentation, he had VT/VFIB related syncope and subsequent ICD firing.  Acute anemia  Elevated troponin, post ICD firing  Elevated BNP acute anemia related  CAD  CHF rEF 20% decompensated  S/p S-ICD  ESRD on HD  hemarrhoid  H/o excessive bleeding when on anticoagulation Rx  AFL s/p CTI ablation in     Plan:  Continue coreg, amiodarone 200 mg bid, ASA and Plavix.  resume his Imdur and Ranexa  HD per nephro  DASH and fluid restriction  Pt's family does not want to stress test now.   - c/w Spiriva  - oxygen prn to keep O2 sat 88-93%

## (undated) DEVICE — APPLICATOR CHLORAPREP ORN 26ML

## (undated) DEVICE — STRAP FOOT STIRRUP 3 X 22

## (undated) DEVICE — MANIFOLD 4 PORT

## (undated) DEVICE — DRAPE HIP TIBURON 87X115X134

## (undated) DEVICE — GLOVE BIOGEL ECLIPSE SZ 7.5

## (undated) DEVICE — IMPLANTABLE DEVICE
Type: IMPLANTABLE DEVICE | Site: HIP | Status: NON-FUNCTIONAL
Removed: 2017-03-13

## (undated) DEVICE — SUT ETHIBOND EXCEL 2 V37 30

## (undated) DEVICE — STAPLER SKIN PROXIMATE WIDE

## (undated) DEVICE — SOL 9P NACL IRR PIC IL

## (undated) DEVICE — SEE MEDLINE ITEM 157117

## (undated) DEVICE — UNDERGLOVES BIOGEL PI SIZE 8

## (undated) DEVICE — Device

## (undated) DEVICE — SUT VICRYL 3-0 27 SH

## (undated) DEVICE — SEE MEDLINE ITEM 152622

## (undated) DEVICE — ROD GUIDE BALL TIP 3X1000MM
Type: IMPLANTABLE DEVICE | Site: HIP | Status: NON-FUNCTIONAL
Removed: 2017-03-13

## (undated) DEVICE — TAPE SURGICAL MICROFOAM 3IN

## (undated) DEVICE — SUT VICRYL PLUS 0 CT1 36IN

## (undated) DEVICE — DRAPE INCISE IOBAN 2 13X13IN

## (undated) DEVICE — ELECTRODE REM PLYHSV RETURN 9

## (undated) DEVICE — BIT DRILL PILOT AO SHORT 4MM L

## (undated) DEVICE — TIP SUCTION YANKAUER

## (undated) DEVICE — DRAPE PLASTIC U 60X72

## (undated) DEVICE — COVER OVERHEAD SURG LT BLUE

## (undated) DEVICE — SEE MEDLINE ITEM 157027

## (undated) DEVICE — TAPE SILK 3IN

## (undated) DEVICE — SUT VICRYL 1 OB 36 CTX